# Patient Record
Sex: FEMALE | Race: WHITE | Employment: FULL TIME | URBAN - METROPOLITAN AREA
[De-identification: names, ages, dates, MRNs, and addresses within clinical notes are randomized per-mention and may not be internally consistent; named-entity substitution may affect disease eponyms.]

---

## 2019-06-24 ENCOUNTER — HOSPITAL ENCOUNTER (OUTPATIENT)
Dept: ULTRASOUND IMAGING | Age: 29
Discharge: HOME | End: 2019-06-24
Payer: COMMERCIAL

## 2019-06-24 DIAGNOSIS — R10.31 RLQ ABDOMINAL PAIN: ICD-10-CM

## 2019-06-24 DIAGNOSIS — R19.00 ABDOMINAL MASS, UNSPECIFIED ABDOMINAL LOCATION: ICD-10-CM

## 2019-06-24 PROCEDURE — 76856 US EXAM PELVIC COMPLETE: CPT

## 2019-06-24 PROCEDURE — 76830 TRANSVAGINAL US NON-OB: CPT

## 2019-06-27 ENCOUNTER — HOSPITAL ENCOUNTER (OUTPATIENT)
Dept: MRI IMAGING | Age: 29
Discharge: HOME | End: 2019-06-27
Payer: COMMERCIAL

## 2019-06-27 DIAGNOSIS — R19.00 PELVIC MASS: ICD-10-CM

## 2019-06-27 PROCEDURE — 6360000004 HC RX CONTRAST MEDICATION: Performed by: OBSTETRICS & GYNECOLOGY

## 2019-06-27 PROCEDURE — 72197 MRI PELVIS W/O & W/DYE: CPT

## 2019-06-27 PROCEDURE — A9579 GAD-BASE MR CONTRAST NOS,1ML: HCPCS | Performed by: OBSTETRICS & GYNECOLOGY

## 2019-06-27 RX ADMIN — GADOTERIDOL 10 ML: 279.3 INJECTION, SOLUTION INTRAVENOUS at 18:40

## 2019-07-01 ENCOUNTER — ANESTHESIA EVENT (OUTPATIENT)
Dept: OPERATING ROOM | Age: 29
DRG: 738 | End: 2019-07-01
Payer: COMMERCIAL

## 2019-07-01 NOTE — ANESTHESIA PRE PROCEDURE
kg)  (07/02/19)   BMI: 23           NPO Status:  >8 hrs    CBC:     HGB HCT PLT WBC   07/02/19 0645    13.2 g/dL       38.0 %       199 K/uL       3.7Low  K/uL        HCG (If Applicable):  negative    Anesthesia Evaluation  Patient summary reviewed and Nursing notes reviewed  Airway: Mallampati: II  TM distance: >3 FB   Neck ROM: full  Mouth opening: > = 3 FB Dental:          Pulmonary:       (-) COPD, asthma, sleep apnea and not a current smoker                           Cardiovascular:  Exercise tolerance: good (>4 METS),       (-) hypertension, dysrhythmias,  angina (HPI:  ) and  MIGUEL                Neuro/Psych:      (-) seizures, TIA and CVA           GI/Hepatic/Renal:        (-) GERD and no renal disease       Endo/Other:        (-) diabetes mellitus, hypothyroidism               Abdominal:           Vascular:     - DVT and PE. Anesthesia Plan      general     ASA 2     (TAP Block(s) for post-op pain management per surgeon request.)  Induction: intravenous. MIPS: Prophylactic antiemetics administered. Anesthetic plan and risks discussed with patient and mother. Plan discussed with CRNA.           Candy Gr MD

## 2019-07-02 ENCOUNTER — HOSPITAL ENCOUNTER (INPATIENT)
Age: 29
LOS: 2 days | Discharge: HOME | DRG: 738 | End: 2019-07-04
Attending: OBSTETRICS & GYNECOLOGY | Admitting: OBSTETRICS & GYNECOLOGY
Payer: COMMERCIAL

## 2019-07-02 ENCOUNTER — ANESTHESIA (OUTPATIENT)
Dept: OPERATING ROOM | Age: 29
DRG: 738 | End: 2019-07-02
Payer: COMMERCIAL

## 2019-07-02 VITALS — SYSTOLIC BLOOD PRESSURE: 95 MMHG | OXYGEN SATURATION: 100 % | DIASTOLIC BLOOD PRESSURE: 66 MMHG | TEMPERATURE: 97.7 F

## 2019-07-02 DIAGNOSIS — C56.1 RIGHT OVARIAN EPITHELIAL CANCER (HCC): Primary | ICD-10-CM

## 2019-07-02 LAB
ABO/RH: NORMAL
ANTIBODY SCREEN: NORMAL
BASOPHILS ABSOLUTE: 0 K/UL (ref 0–0.2)
BASOPHILS RELATIVE PERCENT: 0.4 %
EOSINOPHILS ABSOLUTE: 0 K/UL (ref 0–0.6)
EOSINOPHILS RELATIVE PERCENT: 1 %
HCT VFR BLD CALC: 38 % (ref 36–48)
HEMOGLOBIN: 13.2 G/DL (ref 12–16)
LYMPHOCYTES ABSOLUTE: 0.8 K/UL (ref 1–5.1)
LYMPHOCYTES RELATIVE PERCENT: 20.9 %
MCH RBC QN AUTO: 30.5 PG (ref 26–34)
MCHC RBC AUTO-ENTMCNC: 34.9 G/DL (ref 31–36)
MCV RBC AUTO: 87.4 FL (ref 80–100)
MONOCYTES ABSOLUTE: 0.6 K/UL (ref 0–1.3)
MONOCYTES RELATIVE PERCENT: 16.7 %
NEUTROPHILS ABSOLUTE: 2.3 K/UL (ref 1.7–7.7)
NEUTROPHILS RELATIVE PERCENT: 61 %
PDW BLD-RTO: 12 % (ref 12.4–15.4)
PLATELET # BLD: 199 K/UL (ref 135–450)
PMV BLD AUTO: 7.7 FL (ref 5–10.5)
PREGNANCY, URINE: NEGATIVE
RBC # BLD: 4.34 M/UL (ref 4–5.2)
WBC # BLD: 3.7 K/UL (ref 4–11)

## 2019-07-02 PROCEDURE — 0UT00ZZ RESECTION OF RIGHT OVARY, OPEN APPROACH: ICD-10-PCS | Performed by: OBSTETRICS & GYNECOLOGY

## 2019-07-02 PROCEDURE — 2500000003 HC RX 250 WO HCPCS: Performed by: NURSE ANESTHETIST, CERTIFIED REGISTERED

## 2019-07-02 PROCEDURE — 7100000001 HC PACU RECOVERY - ADDTL 15 MIN: Performed by: OBSTETRICS & GYNECOLOGY

## 2019-07-02 PROCEDURE — 0DBU0ZZ EXCISION OF OMENTUM, OPEN APPROACH: ICD-10-PCS | Performed by: OBSTETRICS & GYNECOLOGY

## 2019-07-02 PROCEDURE — 85025 COMPLETE CBC W/AUTO DIFF WBC: CPT

## 2019-07-02 PROCEDURE — 88307 TISSUE EXAM BY PATHOLOGIST: CPT

## 2019-07-02 PROCEDURE — 3600000014 HC SURGERY LEVEL 4 ADDTL 15MIN: Performed by: OBSTETRICS & GYNECOLOGY

## 2019-07-02 PROCEDURE — 2580000003 HC RX 258: Performed by: ANESTHESIOLOGY

## 2019-07-02 PROCEDURE — 3700000000 HC ANESTHESIA ATTENDED CARE: Performed by: OBSTETRICS & GYNECOLOGY

## 2019-07-02 PROCEDURE — 36415 COLL VENOUS BLD VENIPUNCTURE: CPT

## 2019-07-02 PROCEDURE — 3600000004 HC SURGERY LEVEL 4 BASE: Performed by: OBSTETRICS & GYNECOLOGY

## 2019-07-02 PROCEDURE — 88341 IMHCHEM/IMCYTCHM EA ADD ANTB: CPT

## 2019-07-02 PROCEDURE — 2709999900 HC NON-CHARGEABLE SUPPLY: Performed by: OBSTETRICS & GYNECOLOGY

## 2019-07-02 PROCEDURE — 0UT50ZZ RESECTION OF RIGHT FALLOPIAN TUBE, OPEN APPROACH: ICD-10-PCS | Performed by: OBSTETRICS & GYNECOLOGY

## 2019-07-02 PROCEDURE — 88112 CYTOPATH CELL ENHANCE TECH: CPT

## 2019-07-02 PROCEDURE — 3700000001 HC ADD 15 MINUTES (ANESTHESIA): Performed by: OBSTETRICS & GYNECOLOGY

## 2019-07-02 PROCEDURE — 2500000003 HC RX 250 WO HCPCS: Performed by: ANESTHESIOLOGY

## 2019-07-02 PROCEDURE — 94770 HC ETCO2 MONITOR DAILY: CPT

## 2019-07-02 PROCEDURE — 6360000002 HC RX W HCPCS: Performed by: NURSE ANESTHETIST, CERTIFIED REGISTERED

## 2019-07-02 PROCEDURE — 6370000000 HC RX 637 (ALT 250 FOR IP): Performed by: ANESTHESIOLOGY

## 2019-07-02 PROCEDURE — 84703 CHORIONIC GONADOTROPIN ASSAY: CPT

## 2019-07-02 PROCEDURE — 2720000010 HC SURG SUPPLY STERILE: Performed by: OBSTETRICS & GYNECOLOGY

## 2019-07-02 PROCEDURE — 6360000002 HC RX W HCPCS: Performed by: ANESTHESIOLOGY

## 2019-07-02 PROCEDURE — 64488 TAP BLOCK BI INJECTION: CPT | Performed by: ANESTHESIOLOGY

## 2019-07-02 PROCEDURE — 88331 PATH CONSLTJ SURG 1 BLK 1SPC: CPT

## 2019-07-02 PROCEDURE — 2580000003 HC RX 258: Performed by: OBSTETRICS & GYNECOLOGY

## 2019-07-02 PROCEDURE — 1200000000 HC SEMI PRIVATE

## 2019-07-02 PROCEDURE — 6360000002 HC RX W HCPCS: Performed by: OBSTETRICS & GYNECOLOGY

## 2019-07-02 PROCEDURE — 86850 RBC ANTIBODY SCREEN: CPT

## 2019-07-02 PROCEDURE — 88305 TISSUE EXAM BY PATHOLOGIST: CPT

## 2019-07-02 PROCEDURE — 86901 BLOOD TYPING SEROLOGIC RH(D): CPT

## 2019-07-02 PROCEDURE — 86304 IMMUNOASSAY TUMOR CA 125: CPT

## 2019-07-02 PROCEDURE — 88342 IMHCHEM/IMCYTCHM 1ST ANTB: CPT

## 2019-07-02 PROCEDURE — 86900 BLOOD TYPING SEROLOGIC ABO: CPT

## 2019-07-02 PROCEDURE — 7100000000 HC PACU RECOVERY - FIRST 15 MIN: Performed by: OBSTETRICS & GYNECOLOGY

## 2019-07-02 RX ORDER — DEXAMETHASONE SODIUM PHOSPHATE 4 MG/ML
INJECTION, SOLUTION INTRA-ARTICULAR; INTRALESIONAL; INTRAMUSCULAR; INTRAVENOUS; SOFT TISSUE PRN
Status: DISCONTINUED | OUTPATIENT
Start: 2019-07-02 | End: 2019-07-02 | Stop reason: SDUPTHER

## 2019-07-02 RX ORDER — LIDOCAINE HYDROCHLORIDE 20 MG/ML
INJECTION, SOLUTION INFILTRATION; PERINEURAL PRN
Status: DISCONTINUED | OUTPATIENT
Start: 2019-07-02 | End: 2019-07-02 | Stop reason: SDUPTHER

## 2019-07-02 RX ORDER — KETOROLAC TROMETHAMINE 30 MG/ML
INJECTION, SOLUTION INTRAMUSCULAR; INTRAVENOUS PRN
Status: DISCONTINUED | OUTPATIENT
Start: 2019-07-02 | End: 2019-07-02 | Stop reason: SDUPTHER

## 2019-07-02 RX ORDER — ONDANSETRON 2 MG/ML
4 INJECTION INTRAMUSCULAR; INTRAVENOUS EVERY 6 HOURS PRN
Status: DISCONTINUED | OUTPATIENT
Start: 2019-07-02 | End: 2019-07-04 | Stop reason: HOSPADM

## 2019-07-02 RX ORDER — SODIUM CHLORIDE 0.9 % (FLUSH) 0.9 %
10 SYRINGE (ML) INJECTION PRN
Status: DISCONTINUED | OUTPATIENT
Start: 2019-07-02 | End: 2019-07-04 | Stop reason: HOSPADM

## 2019-07-02 RX ORDER — DEXTROSE, SODIUM CHLORIDE, SODIUM LACTATE, POTASSIUM CHLORIDE, AND CALCIUM CHLORIDE 5; .6; .31; .03; .02 G/100ML; G/100ML; G/100ML; G/100ML; G/100ML
INJECTION, SOLUTION INTRAVENOUS CONTINUOUS
Status: DISCONTINUED | OUTPATIENT
Start: 2019-07-02 | End: 2019-07-04 | Stop reason: HOSPADM

## 2019-07-02 RX ORDER — SCOLOPAMINE TRANSDERMAL SYSTEM 1 MG/1
1 PATCH, EXTENDED RELEASE TRANSDERMAL
Status: DISCONTINUED | OUTPATIENT
Start: 2019-07-02 | End: 2019-07-04 | Stop reason: HOSPADM

## 2019-07-02 RX ORDER — ONDANSETRON 2 MG/ML
INJECTION INTRAMUSCULAR; INTRAVENOUS PRN
Status: DISCONTINUED | OUTPATIENT
Start: 2019-07-02 | End: 2019-07-02

## 2019-07-02 RX ORDER — ACETAMINOPHEN 325 MG/1
650 TABLET ORAL EVERY 4 HOURS PRN
Status: DISCONTINUED | OUTPATIENT
Start: 2019-07-02 | End: 2019-07-04 | Stop reason: HOSPADM

## 2019-07-02 RX ORDER — SODIUM CHLORIDE 0.9 % (FLUSH) 0.9 %
10 SYRINGE (ML) INJECTION PRN
Status: DISCONTINUED | OUTPATIENT
Start: 2019-07-02 | End: 2019-07-02 | Stop reason: HOSPADM

## 2019-07-02 RX ORDER — BUPIVACAINE HYDROCHLORIDE 2.5 MG/ML
INJECTION, SOLUTION EPIDURAL; INFILTRATION; INTRACAUDAL PRN
Status: DISCONTINUED | OUTPATIENT
Start: 2019-07-02 | End: 2019-07-02 | Stop reason: SDUPTHER

## 2019-07-02 RX ORDER — LIDOCAINE HYDROCHLORIDE 10 MG/ML
2 INJECTION, SOLUTION INFILTRATION; PERINEURAL
Status: DISCONTINUED | OUTPATIENT
Start: 2019-07-02 | End: 2019-07-02 | Stop reason: HOSPADM

## 2019-07-02 RX ORDER — GLYCOPYRROLATE 0.2 MG/ML
INJECTION INTRAMUSCULAR; INTRAVENOUS PRN
Status: DISCONTINUED | OUTPATIENT
Start: 2019-07-02 | End: 2019-07-02 | Stop reason: SDUPTHER

## 2019-07-02 RX ORDER — FENTANYL CITRATE 50 UG/ML
INJECTION, SOLUTION INTRAMUSCULAR; INTRAVENOUS PRN
Status: DISCONTINUED | OUTPATIENT
Start: 2019-07-02 | End: 2019-07-02 | Stop reason: SDUPTHER

## 2019-07-02 RX ORDER — HYDRALAZINE HYDROCHLORIDE 20 MG/ML
5 INJECTION INTRAMUSCULAR; INTRAVENOUS EVERY 10 MIN PRN
Status: DISCONTINUED | OUTPATIENT
Start: 2019-07-02 | End: 2019-07-02 | Stop reason: HOSPADM

## 2019-07-02 RX ORDER — APREPITANT 40 MG/1
40 CAPSULE ORAL ONCE
Status: COMPLETED | OUTPATIENT
Start: 2019-07-02 | End: 2019-07-02

## 2019-07-02 RX ORDER — NALOXONE HYDROCHLORIDE 0.4 MG/ML
0.4 INJECTION, SOLUTION INTRAMUSCULAR; INTRAVENOUS; SUBCUTANEOUS PRN
Status: DISCONTINUED | OUTPATIENT
Start: 2019-07-02 | End: 2019-07-03

## 2019-07-02 RX ORDER — OXYCODONE HYDROCHLORIDE AND ACETAMINOPHEN 5; 325 MG/1; MG/1
1 TABLET ORAL PRN
Status: DISCONTINUED | OUTPATIENT
Start: 2019-07-02 | End: 2019-07-02 | Stop reason: HOSPADM

## 2019-07-02 RX ORDER — SODIUM CHLORIDE 0.9 % (FLUSH) 0.9 %
10 SYRINGE (ML) INJECTION EVERY 12 HOURS SCHEDULED
Status: DISCONTINUED | OUTPATIENT
Start: 2019-07-02 | End: 2019-07-02 | Stop reason: HOSPADM

## 2019-07-02 RX ORDER — SODIUM CHLORIDE, SODIUM LACTATE, POTASSIUM CHLORIDE, CALCIUM CHLORIDE 600; 310; 30; 20 MG/100ML; MG/100ML; MG/100ML; MG/100ML
INJECTION, SOLUTION INTRAVENOUS CONTINUOUS
Status: DISCONTINUED | OUTPATIENT
Start: 2019-07-02 | End: 2019-07-04 | Stop reason: HOSPADM

## 2019-07-02 RX ORDER — PROPOFOL 10 MG/ML
INJECTION, EMULSION INTRAVENOUS PRN
Status: DISCONTINUED | OUTPATIENT
Start: 2019-07-02 | End: 2019-07-02 | Stop reason: SDUPTHER

## 2019-07-02 RX ORDER — HYDROMORPHONE HCL 110MG/55ML
PATIENT CONTROLLED ANALGESIA SYRINGE INTRAVENOUS PRN
Status: DISCONTINUED | OUTPATIENT
Start: 2019-07-02 | End: 2019-07-02 | Stop reason: SDUPTHER

## 2019-07-02 RX ORDER — LEVOFLOXACIN 5 MG/ML
500 INJECTION, SOLUTION INTRAVENOUS
Status: COMPLETED | OUTPATIENT
Start: 2019-07-02 | End: 2019-07-02

## 2019-07-02 RX ORDER — MIDAZOLAM HYDROCHLORIDE 1 MG/ML
INJECTION INTRAMUSCULAR; INTRAVENOUS PRN
Status: DISCONTINUED | OUTPATIENT
Start: 2019-07-02 | End: 2019-07-02 | Stop reason: SDUPTHER

## 2019-07-02 RX ORDER — ROCURONIUM BROMIDE 10 MG/ML
INJECTION, SOLUTION INTRAVENOUS PRN
Status: DISCONTINUED | OUTPATIENT
Start: 2019-07-02 | End: 2019-07-02 | Stop reason: SDUPTHER

## 2019-07-02 RX ORDER — MEPERIDINE HYDROCHLORIDE 50 MG/ML
12.5 INJECTION INTRAMUSCULAR; INTRAVENOUS; SUBCUTANEOUS EVERY 5 MIN PRN
Status: DISCONTINUED | OUTPATIENT
Start: 2019-07-02 | End: 2019-07-02 | Stop reason: HOSPADM

## 2019-07-02 RX ORDER — PROMETHAZINE HYDROCHLORIDE 25 MG/ML
6.25 INJECTION, SOLUTION INTRAMUSCULAR; INTRAVENOUS
Status: DISCONTINUED | OUTPATIENT
Start: 2019-07-02 | End: 2019-07-02 | Stop reason: HOSPADM

## 2019-07-02 RX ORDER — FENTANYL CITRATE 50 UG/ML
25 INJECTION, SOLUTION INTRAMUSCULAR; INTRAVENOUS EVERY 5 MIN PRN
Status: DISCONTINUED | OUTPATIENT
Start: 2019-07-02 | End: 2019-07-02 | Stop reason: HOSPADM

## 2019-07-02 RX ORDER — PROMETHAZINE HYDROCHLORIDE 25 MG/ML
INJECTION, SOLUTION INTRAMUSCULAR; INTRAVENOUS PRN
Status: DISCONTINUED | OUTPATIENT
Start: 2019-07-02 | End: 2019-07-02 | Stop reason: SDUPTHER

## 2019-07-02 RX ORDER — SODIUM CHLORIDE 0.9 % (FLUSH) 0.9 %
10 SYRINGE (ML) INJECTION EVERY 12 HOURS SCHEDULED
Status: DISCONTINUED | OUTPATIENT
Start: 2019-07-02 | End: 2019-07-04 | Stop reason: HOSPADM

## 2019-07-02 RX ORDER — ONDANSETRON 2 MG/ML
4 INJECTION INTRAMUSCULAR; INTRAVENOUS
Status: DISCONTINUED | OUTPATIENT
Start: 2019-07-02 | End: 2019-07-02 | Stop reason: HOSPADM

## 2019-07-02 RX ORDER — ACETAMINOPHEN 500 MG
1000 TABLET ORAL ONCE
Status: COMPLETED | OUTPATIENT
Start: 2019-07-02 | End: 2019-07-02

## 2019-07-02 RX ORDER — MORPHINE SULFATE/0.9% NACL/PF 1 MG/ML
SYRINGE (ML) INJECTION CONTINUOUS
Status: DISCONTINUED | OUTPATIENT
Start: 2019-07-02 | End: 2019-07-03

## 2019-07-02 RX ORDER — MAGNESIUM HYDROXIDE 1200 MG/15ML
LIQUID ORAL CONTINUOUS PRN
Status: COMPLETED | OUTPATIENT
Start: 2019-07-02 | End: 2019-07-02

## 2019-07-02 RX ORDER — LIDOCAINE HYDROCHLORIDE 10 MG/ML
1 INJECTION, SOLUTION EPIDURAL; INFILTRATION; INTRACAUDAL; PERINEURAL
Status: DISCONTINUED | OUTPATIENT
Start: 2019-07-02 | End: 2019-07-02 | Stop reason: HOSPADM

## 2019-07-02 RX ORDER — DOCUSATE SODIUM 100 MG/1
100 CAPSULE, LIQUID FILLED ORAL 2 TIMES DAILY
Status: DISCONTINUED | OUTPATIENT
Start: 2019-07-03 | End: 2019-07-04 | Stop reason: HOSPADM

## 2019-07-02 RX ORDER — OXYCODONE HYDROCHLORIDE AND ACETAMINOPHEN 5; 325 MG/1; MG/1
2 TABLET ORAL PRN
Status: DISCONTINUED | OUTPATIENT
Start: 2019-07-02 | End: 2019-07-02 | Stop reason: HOSPADM

## 2019-07-02 RX ADMIN — PROMETHAZINE HYDROCHLORIDE 12.5 MG: 25 INJECTION INTRAMUSCULAR; INTRAVENOUS at 09:37

## 2019-07-02 RX ADMIN — ONDANSETRON 4 MG: 2 INJECTION INTRAMUSCULAR; INTRAVENOUS at 07:45

## 2019-07-02 RX ADMIN — HYDROMORPHONE HYDROCHLORIDE 0.5 MG: 2 INJECTION INTRAMUSCULAR; INTRAVENOUS; SUBCUTANEOUS at 08:24

## 2019-07-02 RX ADMIN — DEXAMETHASONE SODIUM PHOSPHATE 8 MG: 4 INJECTION, SOLUTION INTRAMUSCULAR; INTRAVENOUS at 07:45

## 2019-07-02 RX ADMIN — PHENYLEPHRINE HYDROCHLORIDE 100 MCG: 10 INJECTION INTRAVENOUS at 08:34

## 2019-07-02 RX ADMIN — HYDROMORPHONE HYDROCHLORIDE 0.5 MG: 2 INJECTION INTRAMUSCULAR; INTRAVENOUS; SUBCUTANEOUS at 09:01

## 2019-07-02 RX ADMIN — MIDAZOLAM HYDROCHLORIDE 2 MG: 2 INJECTION, SOLUTION INTRAMUSCULAR; INTRAVENOUS at 07:29

## 2019-07-02 RX ADMIN — PHENYLEPHRINE HYDROCHLORIDE 100 MCG: 10 INJECTION INTRAVENOUS at 09:20

## 2019-07-02 RX ADMIN — SODIUM CHLORIDE, SODIUM LACTATE, POTASSIUM CHLORIDE, CALCIUM CHLORIDE AND DEXTROSE MONOHYDRATE: 5; 600; 310; 30; 20 INJECTION, SOLUTION INTRAVENOUS at 20:44

## 2019-07-02 RX ADMIN — SODIUM CHLORIDE, POTASSIUM CHLORIDE, SODIUM LACTATE AND CALCIUM CHLORIDE: 600; 310; 30; 20 INJECTION, SOLUTION INTRAVENOUS at 06:54

## 2019-07-02 RX ADMIN — SUGAMMADEX 200 MG: 100 INJECTION, SOLUTION INTRAVENOUS at 09:47

## 2019-07-02 RX ADMIN — BUPIVACAINE HYDROCHLORIDE 30 ML: 2.5 INJECTION, SOLUTION EPIDURAL; INFILTRATION; INTRACAUDAL; PERINEURAL at 10:41

## 2019-07-02 RX ADMIN — GLYCOPYRROLATE 0.2 MG: 0.2 INJECTION, SOLUTION INTRAMUSCULAR; INTRAVENOUS at 08:10

## 2019-07-02 RX ADMIN — FENTANYL CITRATE 50 MCG: 50 INJECTION INTRAMUSCULAR; INTRAVENOUS at 07:32

## 2019-07-02 RX ADMIN — BUPIVACAINE HYDROCHLORIDE 30 ML: 2.5 INJECTION, SOLUTION EPIDURAL; INFILTRATION; INTRACAUDAL; PERINEURAL at 10:44

## 2019-07-02 RX ADMIN — FENTANYL CITRATE 50 MCG: 50 INJECTION INTRAMUSCULAR; INTRAVENOUS at 07:53

## 2019-07-02 RX ADMIN — SODIUM CHLORIDE, POTASSIUM CHLORIDE, SODIUM LACTATE AND CALCIUM CHLORIDE: 600; 310; 30; 20 INJECTION, SOLUTION INTRAVENOUS at 08:21

## 2019-07-02 RX ADMIN — KETOROLAC TROMETHAMINE 30 MG: 30 INJECTION, SOLUTION INTRAMUSCULAR; INTRAVENOUS at 09:37

## 2019-07-02 RX ADMIN — PHENYLEPHRINE HYDROCHLORIDE 100 MCG: 10 INJECTION INTRAVENOUS at 09:08

## 2019-07-02 RX ADMIN — ROCURONIUM BROMIDE 35 MG: 10 SOLUTION INTRAVENOUS at 07:35

## 2019-07-02 RX ADMIN — APREPITANT 40 MG: 40 CAPSULE ORAL at 06:59

## 2019-07-02 RX ADMIN — LEVOFLOXACIN 500 MG: 5 INJECTION, SOLUTION INTRAVENOUS at 07:32

## 2019-07-02 RX ADMIN — ROCURONIUM BROMIDE 10 MG: 10 SOLUTION INTRAVENOUS at 09:00

## 2019-07-02 RX ADMIN — SODIUM CHLORIDE, SODIUM LACTATE, POTASSIUM CHLORIDE, CALCIUM CHLORIDE AND DEXTROSE MONOHYDRATE: 5; 600; 310; 30; 20 INJECTION, SOLUTION INTRAVENOUS at 17:57

## 2019-07-02 RX ADMIN — ACETAMINOPHEN 1000 MG: 500 TABLET ORAL at 06:58

## 2019-07-02 RX ADMIN — PROPOFOL 100 MG: 10 INJECTION, EMULSION INTRAVENOUS at 07:35

## 2019-07-02 RX ADMIN — ROCURONIUM BROMIDE 15 MG: 10 SOLUTION INTRAVENOUS at 08:21

## 2019-07-02 RX ADMIN — MORPHINE SULFATE 30 MG: 1 INJECTION INTRAVENOUS at 10:51

## 2019-07-02 RX ADMIN — LIDOCAINE HYDROCHLORIDE 40 MG: 20 INJECTION, SOLUTION INFILTRATION; PERINEURAL at 07:35

## 2019-07-02 RX ADMIN — PHENYLEPHRINE HYDROCHLORIDE 100 MCG: 10 INJECTION INTRAVENOUS at 08:46

## 2019-07-02 ASSESSMENT — PULMONARY FUNCTION TESTS
PIF_VALUE: 15
PIF_VALUE: 16
PIF_VALUE: 11
PIF_VALUE: 15
PIF_VALUE: 16
PIF_VALUE: 19
PIF_VALUE: 3
PIF_VALUE: 16
PIF_VALUE: 15
PIF_VALUE: 17
PIF_VALUE: 15
PIF_VALUE: 11
PIF_VALUE: 20
PIF_VALUE: 16
PIF_VALUE: 5
PIF_VALUE: 15
PIF_VALUE: 21
PIF_VALUE: 16
PIF_VALUE: 15
PIF_VALUE: 14
PIF_VALUE: 1
PIF_VALUE: 3
PIF_VALUE: 16
PIF_VALUE: 1
PIF_VALUE: 14
PIF_VALUE: 15
PIF_VALUE: 15
PIF_VALUE: 17
PIF_VALUE: 9
PIF_VALUE: 11
PIF_VALUE: 15
PIF_VALUE: 16
PIF_VALUE: 14
PIF_VALUE: 20
PIF_VALUE: 15
PIF_VALUE: 16
PIF_VALUE: 15
PIF_VALUE: 16
PIF_VALUE: 16
PIF_VALUE: 11
PIF_VALUE: 2
PIF_VALUE: 11
PIF_VALUE: 16
PIF_VALUE: 15
PIF_VALUE: 15
PIF_VALUE: 17
PIF_VALUE: 15
PIF_VALUE: 11
PIF_VALUE: 16
PIF_VALUE: 16
PIF_VALUE: 6
PIF_VALUE: 16
PIF_VALUE: 20
PIF_VALUE: 16
PIF_VALUE: 15
PIF_VALUE: 1
PIF_VALUE: 15
PIF_VALUE: 17
PIF_VALUE: 16
PIF_VALUE: 2
PIF_VALUE: 14
PIF_VALUE: 15
PIF_VALUE: 2
PIF_VALUE: 16
PIF_VALUE: 1
PIF_VALUE: 15
PIF_VALUE: 1
PIF_VALUE: 15
PIF_VALUE: 15
PIF_VALUE: 16
PIF_VALUE: 9
PIF_VALUE: 1
PIF_VALUE: 16
PIF_VALUE: 17
PIF_VALUE: 15
PIF_VALUE: 16
PIF_VALUE: 0
PIF_VALUE: 15
PIF_VALUE: 16
PIF_VALUE: 15
PIF_VALUE: 15
PIF_VALUE: 16
PIF_VALUE: 15
PIF_VALUE: 16
PIF_VALUE: 16
PIF_VALUE: 20
PIF_VALUE: 16
PIF_VALUE: 20
PIF_VALUE: 21
PIF_VALUE: 16
PIF_VALUE: 16
PIF_VALUE: 15
PIF_VALUE: 15
PIF_VALUE: 16
PIF_VALUE: 15
PIF_VALUE: 15
PIF_VALUE: 0
PIF_VALUE: 1
PIF_VALUE: 15
PIF_VALUE: 16
PIF_VALUE: 15
PIF_VALUE: 17
PIF_VALUE: 16
PIF_VALUE: 15
PIF_VALUE: 16
PIF_VALUE: 16
PIF_VALUE: 20
PIF_VALUE: 15
PIF_VALUE: 14
PIF_VALUE: 16
PIF_VALUE: 11
PIF_VALUE: 16
PIF_VALUE: 15
PIF_VALUE: 1
PIF_VALUE: 16
PIF_VALUE: 15
PIF_VALUE: 15
PIF_VALUE: 20
PIF_VALUE: 2
PIF_VALUE: 15
PIF_VALUE: 16
PIF_VALUE: 2
PIF_VALUE: 15
PIF_VALUE: 15
PIF_VALUE: 16
PIF_VALUE: 14
PIF_VALUE: 15
PIF_VALUE: 15
PIF_VALUE: 21
PIF_VALUE: 16
PIF_VALUE: 2
PIF_VALUE: 15
PIF_VALUE: 2
PIF_VALUE: 2
PIF_VALUE: 16
PIF_VALUE: 16
PIF_VALUE: 15
PIF_VALUE: 3
PIF_VALUE: 17
PIF_VALUE: 16
PIF_VALUE: 20
PIF_VALUE: 11

## 2019-07-02 ASSESSMENT — PAIN DESCRIPTION - FREQUENCY
FREQUENCY: CONTINUOUS
FREQUENCY: CONTINUOUS

## 2019-07-02 ASSESSMENT — PAIN DESCRIPTION - PROGRESSION
CLINICAL_PROGRESSION: GRADUALLY IMPROVING
CLINICAL_PROGRESSION: GRADUALLY IMPROVING

## 2019-07-02 ASSESSMENT — PAIN - FUNCTIONAL ASSESSMENT
PAIN_FUNCTIONAL_ASSESSMENT: PREVENTS OR INTERFERES SOME ACTIVE ACTIVITIES AND ADLS
PAIN_FUNCTIONAL_ASSESSMENT: 0-10

## 2019-07-02 ASSESSMENT — PAIN SCALES - GENERAL
PAINLEVEL_OUTOF10: 2
PAINLEVEL_OUTOF10: 3
PAINLEVEL_OUTOF10: 2

## 2019-07-02 ASSESSMENT — PAIN DESCRIPTION - DESCRIPTORS
DESCRIPTORS: ACHING;CRAMPING
DESCRIPTORS: ACHING

## 2019-07-02 ASSESSMENT — PAIN DESCRIPTION - PAIN TYPE: TYPE: SURGICAL PAIN

## 2019-07-02 ASSESSMENT — PAIN DESCRIPTION - ORIENTATION
ORIENTATION: LOWER
ORIENTATION: LOWER

## 2019-07-02 ASSESSMENT — PAIN DESCRIPTION - LOCATION
LOCATION: ABDOMEN
LOCATION: ABDOMEN

## 2019-07-02 ASSESSMENT — PAIN DESCRIPTION - ONSET
ONSET: ON-GOING
ONSET: ON-GOING

## 2019-07-02 ASSESSMENT — LIFESTYLE VARIABLES: SMOKING_STATUS: 0

## 2019-07-02 NOTE — PROGRESS NOTES
Gave bedside report to Manav Ramsey RN C3 floor nurse. Performed 4 eye skin assessment. Patient has stable vitals, no vaginal bleeding or bleeding from sx site. Patient denies pain.  PCA in place

## 2019-07-02 NOTE — ANESTHESIA PROCEDURE NOTES
Peripheral Block    Patient location during procedure: PACU  Start time: 7/2/2019 10:40 AM  End time: 7/2/2019 10:45 AM  Staffing  Anesthesiologist: Jarrett Moran MD  Performed: anesthesiologist   Preanesthetic Checklist  Completed: patient identified, site marked, surgical consent, pre-op evaluation, timeout performed, IV checked, risks and benefits discussed, monitors and equipment checked, anesthesia consent given, oxygen available and patient being monitored  Peripheral Block  Patient position: supine  Prep: ChloraPrep  Patient monitoring: cardiac monitor, continuous pulse ox, frequent blood pressure checks and IV access  Block type: TAP  Laterality: bilateral  Injection technique: single-shot  Procedures: ultrasound guided  Provider prep: sterile gloves  Needle  Needle gauge: 21 G  Needle length: 10 cm  Needle localization: ultrasound guidance  Test dose: negative  Assessment  Injection assessment: negative aspiration for heme and no paresthesia on injection  Slow fractionated injection: yes  Hemodynamics: stable  Additional Notes  Bilateral Transversus Abdominus Plane Blocks  Pt. agrees to risks, benefits and alternatives to block  Block performed at the request of the surgeon for post-operative pain management   Immediately prior to procedure a \"time out\" was called to verify the correct patient, procedure, equipment, support staff. Site/side marked as required  Side: left and right   Site/Approach: Bilateral flanks at the level of the umbilicus in the anterior axillary line  Position: Supine  Sedation: None  Aseptic technique: prepped with chlorhexidine  Ultrasound: yes- see images    Needle:  21 g   10 mm block needle   Local Anesthetic:  Bupivacaine  0.25%   Amount: 30 ml  in 5 ml increments after negative aspiration each time on each side (60 ml total). Easy injection w/o resistance and w/o pain/paresthesias. Pt tolerated procedure well. No complications.   Reason for block: post-op pain

## 2019-07-02 NOTE — BRIEF OP NOTE
Brief Postoperative Note  ______________________________________________________________    Patient: Francie Srinivasan  YOB: 1990  MRN: [id number]  Date of Procedure: 7/2/2019    Pre-Op Diagnosis: Right ovarian mass    Post-Op Diagnosis: Right Ovarian Ca ( frozen)       Procedure(s):  RIGHT SALPINGO OOPHORECTOMY, PELVIS WASHING, PARTIAL OMENTECTOMY, FROZEN SECTION, EXPLORATORY LAPAROTOMY    Anesthesia: General    Surgeon(s):  Amy Felix MD    Assistant:     Estimated Blood Loss (mL): 303    Complications: None    Specimens:   ID Type Source Tests Collected by Time Destination   A : PELVIC  WASHINGS  FOR  CYTOLOGY Body Fluid Abdomen CYTOLOGY, NON-GYN Amy Felix MD 7/2/2019 9212    B : RIGHT TUBE AND OVARY Tissue Abdomen SURGICAL PATHOLOGY Aym Felix MD 7/2/2019 0815    C : BIOPSY CECUM SEROSA Tissue Abdomen SURGICAL PATHOLOGY Amy Felix MD 7/2/2019 0930    D : SMALL BOWELL SEROSA Tissue Abdomen SURGICAL PATHOLOGY Amy Felix MD 7/2/2019 0931    E : BIOPSY OMENTUM Specimen Abdomen SURGICAL PATHOLOGY Amy Felxi MD 7/2/2019 2097    F : RIGHT OVARIAN MASS Tissue Ovary SURGICAL PATHOLOGY Amy Felix MD 7/2/2019 0935        Implants:  * No implants in log *      Drains:   Urethral Catheter Non-latex 16 fr (Active)       Findings: Large Right ovarian mass, friable with necrotic tissue making up the greater portion. Right tube grossly normal, left tube and ovary normal. Uterus grossly normal. Posterior cul-de-sac normal. Cecum and ileum with irregular surface, mostly adhesion with questionable implants. Omentum normal. Pelvic side wall and para aortic areas with lymphadenopathy.      Allan Riojas MD  Date: 7/2/2019  Time: 9:59 AM

## 2019-07-02 NOTE — PROGRESS NOTES
4 Eyes Skin Assessment     The patient is being assess for   Post-Op Surgical    I agree that 2 RN's have performed a thorough Head to Toe Skin Assessment on the patient. ALL assessment sites listed below have been assessed. Areas assessed by both nurses:   [x]   Head, Face, and Ears   [x]   Shoulders, Back, and Chest, Abdomen  [x]   Arms, Elbows, and Hands   [x]   Coccyx, Sacrum, and Ischium  [x]   Legs, Feet, and Heels          **SHARE this note so that the co-signing nurse is able to place an eSignature**    Co-signer eSignature: {Esignature:[id number]}    Does the Patient have Skin Breakdown?   {Blank single:97354::\"No\",\"Yes LDA WOUND CARE was Initiated documentation include the Julianna-wound, Wound Assessment, Measurements, Dressing Treatment, Drainage, and Color\",\"}          Dawit Prevention initiated:  {YES/NO:19732}   Wound Care Orders initiated:  {YES/NO:19732}      [facility] nurse consulted for Pressure Injury (Stage 3,4, Unstageable, DTI, NWPT, Complex wounds)and New or Established Ostomies:  {YES/NO:19732}      Primary Nurse eSignature: Electronically signed by Grayson Flores RN on 7/2/19 at 12:06 PM

## 2019-07-03 LAB
BASOPHILS ABSOLUTE: 0 K/UL (ref 0–0.2)
BASOPHILS RELATIVE PERCENT: 0.2 %
CA 125: 258.8 U/ML (ref 0–35)
EOSINOPHILS ABSOLUTE: 0 K/UL (ref 0–0.6)
EOSINOPHILS RELATIVE PERCENT: 0.2 %
HCT VFR BLD CALC: 34.6 % (ref 36–48)
HEMOGLOBIN: 11.7 G/DL (ref 12–16)
LYMPHOCYTES ABSOLUTE: 0.9 K/UL (ref 1–5.1)
LYMPHOCYTES RELATIVE PERCENT: 13.5 %
MCH RBC QN AUTO: 30.1 PG (ref 26–34)
MCHC RBC AUTO-ENTMCNC: 33.7 G/DL (ref 31–36)
MCV RBC AUTO: 89.3 FL (ref 80–100)
MONOCYTES ABSOLUTE: 0.5 K/UL (ref 0–1.3)
MONOCYTES RELATIVE PERCENT: 8.1 %
NEUTROPHILS ABSOLUTE: 4.9 K/UL (ref 1.7–7.7)
NEUTROPHILS RELATIVE PERCENT: 78 %
PDW BLD-RTO: 12.3 % (ref 12.4–15.4)
PLATELET # BLD: 215 K/UL (ref 135–450)
PMV BLD AUTO: 7.6 FL (ref 5–10.5)
RBC # BLD: 3.88 M/UL (ref 4–5.2)
WBC # BLD: 6.3 K/UL (ref 4–11)

## 2019-07-03 PROCEDURE — 2580000003 HC RX 258: Performed by: OBSTETRICS & GYNECOLOGY

## 2019-07-03 PROCEDURE — 94770 HC ETCO2 MONITOR DAILY: CPT

## 2019-07-03 PROCEDURE — 85025 COMPLETE CBC W/AUTO DIFF WBC: CPT

## 2019-07-03 PROCEDURE — 36415 COLL VENOUS BLD VENIPUNCTURE: CPT

## 2019-07-03 PROCEDURE — 1200000000 HC SEMI PRIVATE

## 2019-07-03 PROCEDURE — 6370000000 HC RX 637 (ALT 250 FOR IP): Performed by: OBSTETRICS & GYNECOLOGY

## 2019-07-03 RX ORDER — OXYCODONE HYDROCHLORIDE AND ACETAMINOPHEN 5; 325 MG/1; MG/1
2 TABLET ORAL EVERY 4 HOURS PRN
Status: DISCONTINUED | OUTPATIENT
Start: 2019-07-03 | End: 2019-07-04 | Stop reason: HOSPADM

## 2019-07-03 RX ORDER — IBUPROFEN 400 MG/1
800 TABLET ORAL EVERY 6 HOURS PRN
Status: DISCONTINUED | OUTPATIENT
Start: 2019-07-03 | End: 2019-07-04 | Stop reason: HOSPADM

## 2019-07-03 RX ORDER — OXYCODONE HYDROCHLORIDE AND ACETAMINOPHEN 5; 325 MG/1; MG/1
1 TABLET ORAL EVERY 4 HOURS PRN
Status: DISCONTINUED | OUTPATIENT
Start: 2019-07-03 | End: 2019-07-04 | Stop reason: HOSPADM

## 2019-07-03 RX ADMIN — Medication 10 ML: at 20:59

## 2019-07-03 RX ADMIN — IBUPROFEN 800 MG: 400 TABLET ORAL at 10:58

## 2019-07-03 RX ADMIN — IBUPROFEN 800 MG: 400 TABLET ORAL at 18:54

## 2019-07-03 RX ADMIN — SODIUM CHLORIDE, SODIUM LACTATE, POTASSIUM CHLORIDE, CALCIUM CHLORIDE AND DEXTROSE MONOHYDRATE: 5; 600; 310; 30; 20 INJECTION, SOLUTION INTRAVENOUS at 05:16

## 2019-07-03 RX ADMIN — DOCUSATE SODIUM 100 MG: 100 CAPSULE, LIQUID FILLED ORAL at 09:34

## 2019-07-03 RX ADMIN — DOCUSATE SODIUM 100 MG: 100 CAPSULE, LIQUID FILLED ORAL at 20:59

## 2019-07-03 ASSESSMENT — PAIN SCALES - GENERAL
PAINLEVEL_OUTOF10: 1
PAINLEVEL_OUTOF10: 2

## 2019-07-03 NOTE — PROGRESS NOTES
POD #1  C/o soreness  BP (!) 92/59   Pulse 73   Temp 97.7 °F (36.5 °C) (Oral)   Resp 15   Ht 5' 2\" (1.575 m)   Wt 125 lb 4.8 oz (56.8 kg)   LMP 06/13/2019   SpO2 98%   BMI 22.92 kg/m²    I/O 4174/2870 net 1304  Lungs clear  Abd soft + B/S  Incision intact  Perineum dry  Doing well  CARLOS BUTLER  9:07 AM

## 2019-07-03 NOTE — PLAN OF CARE
Pt states that her surgical pain is controlled with morphine PCA pump. Pt denies needs currently. Call light within reach, will continue to monitor.

## 2019-07-03 NOTE — OP NOTE
[facility]                 LamMychal roldan                                 OPERATIVE REPORT    PATIENT NAME: Isaac Chau        :        1990  MED REC NO:   [id number]                          ROOM:       3939  ACCOUNT NO:   [de-identified]                           ADMIT DATE: 2019  PROVIDER:     Glynn Espinoza. Ozzie Carlson MD    DATE OF PROCEDURE:  2019    PREOPERATIVE DIAGNOSIS:  Right adnexal mass. 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 OPERATION PERFORMED:  1. Exploratory laparotomy with right salpingo-oophorectomy. 2.  Biopsy of the serosal of the ileum and cecum. 3.  Partial omentectomy, infracolic as high I was able to get given her  Pfannenstiel incision. 4.  Pelvic washing. SURGEON:  ANDI Carlson MD    ANESTHESIA:  General endotracheal.    OPERATIVE FINDINGS:  The patient had enlarged necrotic right ovarian  mass with a large amount of peritoneal fluid. As her right tube  appeared grossly normal, but it fairly close to the ovarian mass, there  seemed to be no involvement with the uterus. Her left tube and ovary  were grossly normal.  The anterior and posterior cul-de-sac had no  evidence of tumor. The omentum was grossly normal.  The pelvic sidewall  and the paraaortic areas were palpated and no lymphadenopathy was  appreciated. OPERATIVE PROCEDURE:  The patient was taken to the operating suite and  placed in the supine position. After administration of general  anesthesia intubation, her legs were frogged and perineum was prepped  and bladder was catheterized and left in situ. Her abdomen was then  prepped and draped in the usual fashion. Through a Pfannenstiel  incision, the peritoneal cavity was entered. On entering the peritoneal  cavity, a large amount of clear fluid was noted.   The trap attached to  the suction was used to collect the was done from angle to angle. The knot was tied on  the patient's left side and buried into the subcutaneous tissue. The  subcu was then approximated with 3-0 Vicryl followed by subcuticular  stitch using 4-0 Vicryl and Steri-Strips and sterile dressing. The  patient tolerated the procedure well. Instrument, sponge, and needle  counts were said to be correct. Estimated blood loss was 500 mL. Of note, prior to the procedure, the patient had desire preservation of  fertility and we had discussed not carrying of the hysterectomy and left  salpingo-oophorectomy as planned unless those areas were grossly   involved with the tumor. Since there were no involvements of the tumor  in this area, decision was made not to carry out this portion. She will  be referred to GYN oncologist and probably infertility specialist for  consultation and further management.         Cayla Hendrickson MD    D: 07/02/2019 10:24:05       T: 07/02/2019 19:43:47     [email or web address]  Job#: [id number]     Doc#: [id number]    CC:

## 2019-07-04 VITALS
WEIGHT: 125.3 LBS | HEART RATE: 88 BPM | OXYGEN SATURATION: 100 % | RESPIRATION RATE: 16 BRPM | TEMPERATURE: 98.1 F | SYSTOLIC BLOOD PRESSURE: 102 MMHG | HEIGHT: 62 IN | BODY MASS INDEX: 23.06 KG/M2 | DIASTOLIC BLOOD PRESSURE: 70 MMHG

## 2019-07-04 PROCEDURE — 6370000000 HC RX 637 (ALT 250 FOR IP): Performed by: OBSTETRICS & GYNECOLOGY

## 2019-07-04 PROCEDURE — 2580000003 HC RX 258: Performed by: OBSTETRICS & GYNECOLOGY

## 2019-07-04 RX ORDER — PSEUDOEPHEDRINE HCL 30 MG
100 TABLET ORAL 2 TIMES DAILY
Qty: 28 CAPSULE | Refills: 1 | Status: SHIPPED | OUTPATIENT
Start: 2019-07-04 | End: 2019-07-18

## 2019-07-04 RX ORDER — OXYCODONE HYDROCHLORIDE AND ACETAMINOPHEN 5; 325 MG/1; MG/1
1 TABLET ORAL EVERY 6 HOURS PRN
Qty: 20 TABLET | Refills: 0 | Status: SHIPPED | OUTPATIENT
Start: 2019-07-04 | End: 2019-07-09

## 2019-07-04 RX ORDER — IBUPROFEN 800 MG/1
800 TABLET ORAL EVERY 6 HOURS PRN
Qty: 60 TABLET | Refills: 1 | Status: ON HOLD | OUTPATIENT
Start: 2019-07-04 | End: 2021-04-11 | Stop reason: ALTCHOICE

## 2019-07-04 RX ADMIN — IBUPROFEN 800 MG: 400 TABLET ORAL at 01:16

## 2019-07-04 RX ADMIN — OXYCODONE HYDROCHLORIDE AND ACETAMINOPHEN 1 TABLET: 5; 325 TABLET ORAL at 05:47

## 2019-07-04 RX ADMIN — DOCUSATE SODIUM 100 MG: 100 CAPSULE, LIQUID FILLED ORAL at 08:28

## 2019-07-04 RX ADMIN — IBUPROFEN 800 MG: 400 TABLET ORAL at 08:36

## 2019-07-04 RX ADMIN — OXYCODONE HYDROCHLORIDE AND ACETAMINOPHEN 1 TABLET: 5; 325 TABLET ORAL at 10:38

## 2019-07-04 RX ADMIN — Medication 10 ML: at 08:28

## 2019-07-04 ASSESSMENT — PAIN SCALES - GENERAL
PAINLEVEL_OUTOF10: 4
PAINLEVEL_OUTOF10: 1
PAINLEVEL_OUTOF10: 2
PAINLEVEL_OUTOF10: 4

## 2019-07-04 NOTE — PROGRESS NOTES
Pt's verbal and written discharge instructions given, all questions answered. IV removed. Follow up appointments made and discussed. New medications reviewed and pain  Medication script given to pt. Pt left in stable condition via wheelchair to private car with family.

## 2019-07-04 NOTE — PROGRESS NOTES
POD #2  Reports tolerating po well., passage of flatus, no nausea or vomiting. No bowel movement. Adequate pain control Motrin and Percocet  /70   Pulse 88   Temp 98.1 °F (36.7 °C) (Oral)   Resp 16   Ht 5' 2\" (1.575 m)   Wt 125 lb 4.8 oz (56.8 kg)   LMP 06/13/2019   SpO2 100%   BMI 22.92 kg/m²    Abd slight distension, Incision intact  Bowel sound active  Hgb 11.3  CA-125 258.8  Pelvic washing negative   Path pending.   Doing well  Plan discharge home  [address]  12:58 PM

## 2019-07-04 NOTE — PROGRESS NOTES
Shift assessment completed. Pt A&O, VSS. Transverse incision, C/D/I, with steri stripes in place. P tolerating general diet with no nausea. Pt up ambulating in room, hallway, and to bathroom independently. Non skid socks on. PRN pain medication given per MAR. Bowel sounds active and pt reports passing gas this morning. Denies any needs at this time. Bed locked and in lowest position. Call light and bedside table within reach. Will continue to monitor.

## 2019-07-19 LAB
CA 125: 73.5 U/ML (ref 0–35)
GONADOTROPIN, CHORIONIC (HCG) QUANT: <5 MIU/ML
LACTATE DEHYDROGENASE: 272 U/L (ref 100–190)

## 2019-07-23 ENCOUNTER — TELEPHONE (OUTPATIENT)
Dept: ENDOCRINOLOGY | Age: 29
End: 2019-07-23

## 2019-07-23 LAB — AFP: 4.9 UG/L

## 2019-07-24 NOTE — PROGRESS NOTES
Obstructive Sleep Apnea (EFE) Screening     Patient:  Bobby Monge    YOB: 1990      Medical Record #:  [id number]                     Date:  7/24/2019     1. Are you a loud and/or regular snorer? []  Yes       [x] No    2. Have you been observed to gasp or stop breathing during sleep? []  Yes       [x] No    3. Do you feel tired or groggy upon awakening or do you awaken with a headache?           []  Yes       [x] No    4. Are you often tired or fatigued during the wake time hours? []  Yes       [x] No    5. Do you fall asleep sitting, reading, watching TV or driving? []  Yes       [x] No    6. Do you often have problems with memory or concentration? []  Yes       [x] No    **If patient's score is ? 3 they are considered high risk for EFE. Notify the anesthesiologist of the high risk and document in focus note. Note:  If the patient's BMI is more than 35 kg m¯² , has neck circumference > 40 cm, and/or high blood pressure the risk is greater (© American Sleep Apnea Association, 2006).

## 2019-07-25 ENCOUNTER — HOSPITAL ENCOUNTER (OUTPATIENT)
Dept: CT IMAGING | Age: 29
Discharge: HOME | End: 2019-07-25
Payer: COMMERCIAL

## 2019-07-25 ENCOUNTER — HOSPITAL ENCOUNTER (OUTPATIENT)
Dept: GENERAL RADIOLOGY | Age: 29
Discharge: HOME | End: 2019-07-25
Payer: COMMERCIAL

## 2019-07-25 ENCOUNTER — HOSPITAL ENCOUNTER (OUTPATIENT)
Dept: PREADMISSION TESTING | Age: 29
Discharge: HOME | End: 2019-07-29
Payer: COMMERCIAL

## 2019-07-25 DIAGNOSIS — C56.1 DYSGERMINOMA OF OVARY, RIGHT (HCC): ICD-10-CM

## 2019-07-25 DIAGNOSIS — Z01.811 PRE-OP CHEST EXAM: ICD-10-CM

## 2019-07-25 LAB
ABO/RH: NORMAL
ANION GAP SERPL CALCULATED.3IONS-SCNC: 11 MMOL/L (ref 3–16)
ANTIBODY SCREEN: NORMAL
BASOPHILS ABSOLUTE: 0 K/UL (ref 0–0.2)
BASOPHILS RELATIVE PERCENT: 0.7 %
BUN BLDV-MCNC: 9 MG/DL (ref 7–20)
CALCIUM SERPL-MCNC: 9.2 MG/DL (ref 8.3–10.6)
CHLORIDE BLD-SCNC: 101 MMOL/L (ref 99–110)
CO2: 26 MMOL/L (ref 21–32)
CREAT SERPL-MCNC: 0.5 MG/DL (ref 0.6–1.1)
EOSINOPHILS ABSOLUTE: 0.1 K/UL (ref 0–0.6)
EOSINOPHILS RELATIVE PERCENT: 2.4 %
GFR AFRICAN AMERICAN: >60
GFR NON-AFRICAN AMERICAN: >60
GLUCOSE BLD-MCNC: 85 MG/DL (ref 70–99)
HCT VFR BLD CALC: 37.5 % (ref 36–48)
HEMOGLOBIN: 12.6 G/DL (ref 12–16)
LYMPHOCYTES ABSOLUTE: 1.1 K/UL (ref 1–5.1)
LYMPHOCYTES RELATIVE PERCENT: 18.4 %
MCH RBC QN AUTO: 30.3 PG (ref 26–34)
MCHC RBC AUTO-ENTMCNC: 33.5 G/DL (ref 31–36)
MCV RBC AUTO: 90.3 FL (ref 80–100)
MONOCYTES ABSOLUTE: 0.7 K/UL (ref 0–1.3)
MONOCYTES RELATIVE PERCENT: 11.1 %
NEUTROPHILS ABSOLUTE: 4.1 K/UL (ref 1.7–7.7)
NEUTROPHILS RELATIVE PERCENT: 67.4 %
PDW BLD-RTO: 13.5 % (ref 12.4–15.4)
PLATELET # BLD: 214 K/UL (ref 135–450)
PMV BLD AUTO: 9.1 FL (ref 5–10.5)
POTASSIUM SERPL-SCNC: 4 MMOL/L (ref 3.5–5.1)
RBC # BLD: 4.16 M/UL (ref 4–5.2)
SODIUM BLD-SCNC: 138 MMOL/L (ref 136–145)
WBC # BLD: 6.1 K/UL (ref 4–11)

## 2019-07-25 PROCEDURE — 71046 X-RAY EXAM CHEST 2 VIEWS: CPT

## 2019-07-25 PROCEDURE — 86901 BLOOD TYPING SEROLOGIC RH(D): CPT

## 2019-07-25 PROCEDURE — 86900 BLOOD TYPING SEROLOGIC ABO: CPT

## 2019-07-25 PROCEDURE — 86850 RBC ANTIBODY SCREEN: CPT

## 2019-07-25 PROCEDURE — 6360000004 HC RX CONTRAST MEDICATION: Performed by: OBSTETRICS & GYNECOLOGY

## 2019-07-25 PROCEDURE — 85025 COMPLETE CBC W/AUTO DIFF WBC: CPT

## 2019-07-25 PROCEDURE — 74177 CT ABD & PELVIS W/CONTRAST: CPT

## 2019-07-25 PROCEDURE — 80048 BASIC METABOLIC PNL TOTAL CA: CPT

## 2019-07-25 PROCEDURE — 36415 COLL VENOUS BLD VENIPUNCTURE: CPT

## 2019-07-25 PROCEDURE — 93005 ELECTROCARDIOGRAM TRACING: CPT | Performed by: OBSTETRICS & GYNECOLOGY

## 2019-07-25 RX ADMIN — IOPAMIDOL 75 ML: 755 INJECTION, SOLUTION INTRAVENOUS at 09:05

## 2019-07-25 RX ADMIN — IOHEXOL 50 ML: 240 INJECTION, SOLUTION INTRATHECAL; INTRAVASCULAR; INTRAVENOUS; ORAL at 09:05

## 2019-07-26 LAB
EKG ATRIAL RATE: 70 BPM
EKG DIAGNOSIS: NORMAL
EKG P AXIS: 52 DEGREES
EKG P-R INTERVAL: 130 MS
EKG Q-T INTERVAL: 382 MS
EKG QRS DURATION: 78 MS
EKG QTC CALCULATION (BAZETT): 412 MS
EKG R AXIS: 87 DEGREES
EKG T AXIS: 57 DEGREES
EKG VENTRICULAR RATE: 70 BPM

## 2019-07-26 PROCEDURE — 93010 ELECTROCARDIOGRAM REPORT: CPT | Performed by: INTERNAL MEDICINE

## 2019-07-29 ENCOUNTER — HOSPITAL ENCOUNTER (OUTPATIENT)
Dept: PULMONOLOGY | Age: 29
Discharge: HOME | End: 2019-07-29
Payer: COMMERCIAL

## 2019-07-29 PROCEDURE — 94010 BREATHING CAPACITY TEST: CPT

## 2019-07-29 PROCEDURE — 94726 PLETHYSMOGRAPHY LUNG VOLUMES: CPT

## 2019-07-29 PROCEDURE — 94729 DIFFUSING CAPACITY: CPT

## 2019-07-30 ENCOUNTER — HOSPITAL ENCOUNTER (OUTPATIENT)
Dept: GENERAL RADIOLOGY | Age: 29
Discharge: HOME | End: 2019-07-30
Attending: OBSTETRICS & GYNECOLOGY
Payer: COMMERCIAL

## 2019-07-30 ENCOUNTER — ANESTHESIA EVENT (OUTPATIENT)
Dept: OPERATING ROOM | Age: 29
End: 2019-07-30
Payer: COMMERCIAL

## 2019-07-30 ENCOUNTER — ANESTHESIA (OUTPATIENT)
Dept: OPERATING ROOM | Age: 29
End: 2019-07-30
Payer: COMMERCIAL

## 2019-07-30 ENCOUNTER — APPOINTMENT (OUTPATIENT)
Dept: GENERAL RADIOLOGY | Age: 29
End: 2019-07-30
Attending: OBSTETRICS & GYNECOLOGY
Payer: COMMERCIAL

## 2019-07-30 ENCOUNTER — HOSPITAL ENCOUNTER (OUTPATIENT)
Age: 29
Setting detail: OUTPATIENT SURGERY
Discharge: HOME | End: 2019-07-30
Attending: OBSTETRICS & GYNECOLOGY | Admitting: OBSTETRICS & GYNECOLOGY
Payer: COMMERCIAL

## 2019-07-30 VITALS
TEMPERATURE: 97.8 F | SYSTOLIC BLOOD PRESSURE: 106 MMHG | RESPIRATION RATE: 20 BRPM | HEART RATE: 68 BPM | DIASTOLIC BLOOD PRESSURE: 73 MMHG | BODY MASS INDEX: 22.26 KG/M2 | OXYGEN SATURATION: 100 % | HEIGHT: 62 IN | WEIGHT: 121 LBS

## 2019-07-30 VITALS — SYSTOLIC BLOOD PRESSURE: 90 MMHG | TEMPERATURE: 95.9 F | OXYGEN SATURATION: 100 % | DIASTOLIC BLOOD PRESSURE: 61 MMHG

## 2019-07-30 DIAGNOSIS — Z95.828 PORT-A-CATH IN PLACE: ICD-10-CM

## 2019-07-30 LAB
ABO/RH: NORMAL
ANTIBODY SCREEN: NORMAL
DLCO %PRED: 89 %
DLCO PRED: NORMAL ML/MIN/MMHG
DLCO/VA %PRED: NORMAL %
DLCO/VA PRED: NORMAL ML/MIN/MMHG
DLCO/VA: NORMAL ML/MIN/MMHG
DLCO: NORMAL ML/MIN/MMHG
EXPIRATORY TIME: NORMAL SEC
FEF 25-75% %PRED-PRE: NORMAL L/SEC
FEF 25-75% PRED: NORMAL L/SEC
FEF 25-75%-PRE: NORMAL L/SEC
FEV1 %PRED-PRE: 88 %
FEV1 PRED: NORMAL L
FEV1/FVC %PRED-PRE: NORMAL %
FEV1/FVC PRED: NORMAL %
FEV1/FVC: 85 %
FEV1: NORMAL L
FVC %PRED-PRE: NORMAL %
FVC PRED: NORMAL L
FVC: NORMAL L
GAW %PRED: NORMAL %
GAW PRED: NORMAL L/S/CMH2O
GAW: NORMAL L/S/CMH2O
IC %PRED: NORMAL %
IC PRED: NORMAL L
IC: NORMAL L
MVV %PRED-PRE: NORMAL %
MVV PRED: NORMAL L/MIN
MVV-PRE: NORMAL L/MIN
PEF %PRED-PRE: NORMAL L/SEC
PEF PRED: NORMAL L/SEC
PEF-PRE: NORMAL L/SEC
PREGNANCY, URINE: NEGATIVE
RAW %PRED: NORMAL %
RAW PRED: NORMAL CMH2O/L/S
RAW: NORMAL CMH2O/L/S
RV %PRED: NORMAL %
RV PRED: NORMAL L
RV: NORMAL L
SVC %PRED: NORMAL %
SVC PRED: NORMAL L
SVC: NORMAL L
TLC %PRED: 77 %
TLC PRED: NORMAL L
TLC: NORMAL L
VA %PRED: NORMAL %
VA PRED: NORMAL L
VA: NORMAL L
VTG %PRED: NORMAL %
VTG PRED: NORMAL L
VTG: NORMAL L

## 2019-07-30 PROCEDURE — 2580000003 HC RX 258: Performed by: ANESTHESIOLOGY

## 2019-07-30 PROCEDURE — 2580000003 HC RX 258: Performed by: OBSTETRICS & GYNECOLOGY

## 2019-07-30 PROCEDURE — 3700000000 HC ANESTHESIA ATTENDED CARE: Performed by: OBSTETRICS & GYNECOLOGY

## 2019-07-30 PROCEDURE — 6360000002 HC RX W HCPCS: Performed by: OBSTETRICS & GYNECOLOGY

## 2019-07-30 PROCEDURE — 84703 CHORIONIC GONADOTROPIN ASSAY: CPT

## 2019-07-30 PROCEDURE — 7100000011 HC PHASE II RECOVERY - ADDTL 15 MIN: Performed by: OBSTETRICS & GYNECOLOGY

## 2019-07-30 PROCEDURE — 2500000003 HC RX 250 WO HCPCS: Performed by: NURSE ANESTHETIST, CERTIFIED REGISTERED

## 2019-07-30 PROCEDURE — 2709999900 HC NON-CHARGEABLE SUPPLY: Performed by: OBSTETRICS & GYNECOLOGY

## 2019-07-30 PROCEDURE — 6360000002 HC RX W HCPCS: Performed by: NURSE ANESTHETIST, CERTIFIED REGISTERED

## 2019-07-30 PROCEDURE — 71045 X-RAY EXAM CHEST 1 VIEW: CPT

## 2019-07-30 PROCEDURE — 7100000010 HC PHASE II RECOVERY - FIRST 15 MIN: Performed by: OBSTETRICS & GYNECOLOGY

## 2019-07-30 PROCEDURE — C1788 PORT, INDWELLING, IMP: HCPCS | Performed by: OBSTETRICS & GYNECOLOGY

## 2019-07-30 PROCEDURE — 77001 FLUOROGUIDE FOR VEIN DEVICE: CPT

## 2019-07-30 PROCEDURE — 3700000001 HC ADD 15 MINUTES (ANESTHESIA): Performed by: OBSTETRICS & GYNECOLOGY

## 2019-07-30 PROCEDURE — 2500000003 HC RX 250 WO HCPCS: Performed by: OBSTETRICS & GYNECOLOGY

## 2019-07-30 PROCEDURE — 3600000004 HC SURGERY LEVEL 4 BASE: Performed by: OBSTETRICS & GYNECOLOGY

## 2019-07-30 PROCEDURE — 3600000014 HC SURGERY LEVEL 4 ADDTL 15MIN: Performed by: OBSTETRICS & GYNECOLOGY

## 2019-07-30 PROCEDURE — 86901 BLOOD TYPING SEROLOGIC RH(D): CPT

## 2019-07-30 PROCEDURE — 86900 BLOOD TYPING SEROLOGIC ABO: CPT

## 2019-07-30 PROCEDURE — 3209999900 FLUORO FOR SURGICAL PROCEDURES

## 2019-07-30 PROCEDURE — 86850 RBC ANTIBODY SCREEN: CPT

## 2019-07-30 DEVICE — PORT INFUS SGL LUMN ATTCH POLYUR OPN END CATH 8FR POWERPRT: Type: IMPLANTABLE DEVICE | Site: CHEST | Status: FUNCTIONAL

## 2019-07-30 RX ORDER — MIDAZOLAM HYDROCHLORIDE 1 MG/ML
INJECTION INTRAMUSCULAR; INTRAVENOUS PRN
Status: DISCONTINUED | OUTPATIENT
Start: 2019-07-30 | End: 2019-07-30 | Stop reason: SDUPTHER

## 2019-07-30 RX ORDER — SODIUM CHLORIDE 0.9 % (FLUSH) 0.9 %
10 SYRINGE (ML) INJECTION EVERY 12 HOURS SCHEDULED
Status: DISCONTINUED | OUTPATIENT
Start: 2019-07-30 | End: 2019-07-30 | Stop reason: HOSPADM

## 2019-07-30 RX ORDER — ONDANSETRON 2 MG/ML
INJECTION INTRAMUSCULAR; INTRAVENOUS PRN
Status: DISCONTINUED | OUTPATIENT
Start: 2019-07-30 | End: 2019-07-30 | Stop reason: SDUPTHER

## 2019-07-30 RX ORDER — SODIUM CHLORIDE 0.9 % (FLUSH) 0.9 %
10 SYRINGE (ML) INJECTION PRN
Status: CANCELLED | OUTPATIENT
Start: 2019-07-30

## 2019-07-30 RX ORDER — PROPOFOL 10 MG/ML
INJECTION, EMULSION INTRAVENOUS CONTINUOUS PRN
Status: DISCONTINUED | OUTPATIENT
Start: 2019-07-30 | End: 2019-07-30 | Stop reason: SDUPTHER

## 2019-07-30 RX ORDER — LIDOCAINE HYDROCHLORIDE 20 MG/ML
INJECTION, SOLUTION INFILTRATION; PERINEURAL PRN
Status: DISCONTINUED | OUTPATIENT
Start: 2019-07-30 | End: 2019-07-30 | Stop reason: SDUPTHER

## 2019-07-30 RX ORDER — ONDANSETRON 2 MG/ML
4 INJECTION INTRAMUSCULAR; INTRAVENOUS
Status: DISCONTINUED | OUTPATIENT
Start: 2019-07-30 | End: 2019-07-30 | Stop reason: HOSPADM

## 2019-07-30 RX ORDER — SODIUM CHLORIDE 0.9 % (FLUSH) 0.9 %
10 SYRINGE (ML) INJECTION EVERY 12 HOURS SCHEDULED
Status: CANCELLED | OUTPATIENT
Start: 2019-07-30

## 2019-07-30 RX ORDER — PROPOFOL 10 MG/ML
INJECTION, EMULSION INTRAVENOUS PRN
Status: DISCONTINUED | OUTPATIENT
Start: 2019-07-30 | End: 2019-07-30 | Stop reason: SDUPTHER

## 2019-07-30 RX ORDER — PROCHLORPERAZINE EDISYLATE 5 MG/ML
5 INJECTION INTRAMUSCULAR; INTRAVENOUS
Status: DISCONTINUED | OUTPATIENT
Start: 2019-07-30 | End: 2019-07-30 | Stop reason: HOSPADM

## 2019-07-30 RX ORDER — BUPIVACAINE HYDROCHLORIDE AND EPINEPHRINE 5; 5 MG/ML; UG/ML
INJECTION, SOLUTION PERINEURAL PRN
Status: DISCONTINUED | OUTPATIENT
Start: 2019-07-30 | End: 2019-07-30 | Stop reason: ALTCHOICE

## 2019-07-30 RX ORDER — MORPHINE SULFATE 4 MG/ML
3 INJECTION, SOLUTION INTRAMUSCULAR; INTRAVENOUS
Status: ACTIVE | OUTPATIENT
Start: 2019-07-30 | End: 2019-07-30

## 2019-07-30 RX ORDER — FENTANYL CITRATE 50 UG/ML
INJECTION, SOLUTION INTRAMUSCULAR; INTRAVENOUS PRN
Status: DISCONTINUED | OUTPATIENT
Start: 2019-07-30 | End: 2019-07-30 | Stop reason: SDUPTHER

## 2019-07-30 RX ORDER — SODIUM CHLORIDE, SODIUM LACTATE, POTASSIUM CHLORIDE, CALCIUM CHLORIDE 600; 310; 30; 20 MG/100ML; MG/100ML; MG/100ML; MG/100ML
INJECTION, SOLUTION INTRAVENOUS CONTINUOUS
Status: CANCELLED | OUTPATIENT
Start: 2019-07-30

## 2019-07-30 RX ORDER — HYDRALAZINE HYDROCHLORIDE 20 MG/ML
10 INJECTION INTRAMUSCULAR; INTRAVENOUS EVERY 10 MIN PRN
Status: DISCONTINUED | OUTPATIENT
Start: 2019-07-30 | End: 2019-07-30 | Stop reason: HOSPADM

## 2019-07-30 RX ORDER — SODIUM CHLORIDE, SODIUM LACTATE, POTASSIUM CHLORIDE, CALCIUM CHLORIDE 600; 310; 30; 20 MG/100ML; MG/100ML; MG/100ML; MG/100ML
INJECTION, SOLUTION INTRAVENOUS CONTINUOUS
Status: DISCONTINUED | OUTPATIENT
Start: 2019-07-30 | End: 2019-07-30 | Stop reason: HOSPADM

## 2019-07-30 RX ORDER — LIDOCAINE HYDROCHLORIDE 10 MG/ML
0.3 INJECTION, SOLUTION EPIDURAL; INFILTRATION; INTRACAUDAL; PERINEURAL
Status: DISCONTINUED | OUTPATIENT
Start: 2019-07-30 | End: 2019-07-30 | Stop reason: HOSPADM

## 2019-07-30 RX ORDER — MEPERIDINE HYDROCHLORIDE 50 MG/ML
12.5 INJECTION INTRAMUSCULAR; INTRAVENOUS; SUBCUTANEOUS EVERY 5 MIN PRN
Status: DISCONTINUED | OUTPATIENT
Start: 2019-07-30 | End: 2019-07-30 | Stop reason: HOSPADM

## 2019-07-30 RX ORDER — SODIUM CHLORIDE 0.9 % (FLUSH) 0.9 %
10 SYRINGE (ML) INJECTION PRN
Status: DISCONTINUED | OUTPATIENT
Start: 2019-07-30 | End: 2019-07-30 | Stop reason: HOSPADM

## 2019-07-30 RX ORDER — OXYCODONE HYDROCHLORIDE 5 MG/1
5 TABLET ORAL
Status: DISCONTINUED | OUTPATIENT
Start: 2019-07-30 | End: 2019-07-30 | Stop reason: HOSPADM

## 2019-07-30 RX ADMIN — CEFAZOLIN 1 G: 1 INJECTION, POWDER, FOR SOLUTION INTRAMUSCULAR; INTRAVENOUS at 14:28

## 2019-07-30 RX ADMIN — PROPOFOL 120 MCG/KG/MIN: 10 INJECTION, EMULSION INTRAVENOUS at 14:31

## 2019-07-30 RX ADMIN — PROPOFOL 40 MG: 10 INJECTION, EMULSION INTRAVENOUS at 14:32

## 2019-07-30 RX ADMIN — LIDOCAINE HYDROCHLORIDE 60 MG: 20 INJECTION, SOLUTION INFILTRATION; PERINEURAL at 14:30

## 2019-07-30 RX ADMIN — SODIUM CHLORIDE, POTASSIUM CHLORIDE, SODIUM LACTATE AND CALCIUM CHLORIDE: 600; 310; 30; 20 INJECTION, SOLUTION INTRAVENOUS at 14:05

## 2019-07-30 RX ADMIN — MIDAZOLAM HYDROCHLORIDE 2 MG: 2 INJECTION, SOLUTION INTRAMUSCULAR; INTRAVENOUS at 14:28

## 2019-07-30 RX ADMIN — ONDANSETRON 4 MG: 2 INJECTION INTRAMUSCULAR; INTRAVENOUS at 14:39

## 2019-07-30 RX ADMIN — FENTANYL CITRATE 100 MCG: 50 INJECTION INTRAMUSCULAR; INTRAVENOUS at 14:31

## 2019-07-30 ASSESSMENT — PULMONARY FUNCTION TESTS
PIF_VALUE: 0
PIF_VALUE: 3
PIF_VALUE: 0
PIF_VALUE: 1
PIF_VALUE: 0
PIF_VALUE: 1
PIF_VALUE: 19
PIF_VALUE: 0
FEV1/FVC: 85
PIF_VALUE: 0
PIF_VALUE: 1
PIF_VALUE: 0
PIF_VALUE: 2
PIF_VALUE: 0
PIF_VALUE: 1
PIF_VALUE: 0
FEV1_PERCENT_PREDICTED_PRE: 88
PIF_VALUE: 0
PIF_VALUE: 0

## 2019-07-30 ASSESSMENT — PAIN - FUNCTIONAL ASSESSMENT: PAIN_FUNCTIONAL_ASSESSMENT: 0-10

## 2019-07-30 ASSESSMENT — LIFESTYLE VARIABLES: SMOKING_STATUS: 0

## 2019-07-30 ASSESSMENT — PAIN SCALES - GENERAL: PAINLEVEL_OUTOF10: 0

## 2019-07-30 NOTE — OP NOTE
sheath and the dilator sheath was then removed. The tip of the catheter was noted at the atriocaval junction under fluoroscopy and good blood return was noted through the catheter. The catheter was at 18 cm at the skin edge. The catheter was then trimmed and attached to the port. The port was tied into the port pocket with 2-0 prolene x 3. The port was easily flushed with heparin and blood return was noted. The port was once again flushed with heparin-saline. The deep subcutaneous tissue was closed with 3-0 vicryl in a running fashion. The skin was closed with 4-0 vicryl in a running fashion. Steri strips and bandages were placed. Sponge, lap, needle and instrument counts were correct x 2. The patient was taken to recovery room in awake and stable condition.     SHERIF LOVE DO  GYN ONCOLOGY  [facility] [phone]

## 2019-07-30 NOTE — ANESTHESIA PRE PROCEDURE
Allergies   Allergen Reactions    Pcn [Penicillins] Shortness Of Breath and Rash       Problem List:    Patient Active Problem List   Diagnosis Code    Right ovarian epithelial cancer ([facility] C56.1       Past Medical History:        Diagnosis Date    [facility] 7/2/2019       Past Surgical History:        Procedure Laterality Date    DENTAL SURGERY      impacted tooth    SALPINGO-OOPHORECTOMY Right 07/02/2019    EXPLOROTORY LAPAROTOMY, RIGHT SALPINGO OOPHORECTOMY,   PELVIC WASHINGS, BIOPSIES OF CECUM SEROSA. SMALL BOWEL SEROSA, OMENTUM performed by Sheena Mohs, MD at [facility] Right 7/2/2019    INCORRECT PROCEDURE       Social History:    Social History     Tobacco Use    Smoking status: Never Smoker    Smokeless tobacco: Never Used   Substance Use Topics    Alcohol use: Yes     Comment: occ                                Counseling given: Not Answered      Vital Signs (Current):   Vitals:    07/24/19 1435 07/30/19 1228   BP:  113/83   Pulse:  77   Resp:  20   Temp:  98.4 °F (36.9 °C)   TempSrc:  Temporal   SpO2:  100%   Weight: 125 lb (56.7 kg) 121 lb (54.9 kg)   Height:  5' 2\" (1.575 m)                                              BP Readings from Last 3 Encounters:   07/30/19 113/83   07/04/19 102/70   07/02/19 95/66       NPO Status:                                                                                 BMI:   Wt Readings from Last 3 Encounters:   07/30/19 121 lb (54.9 kg)   07/02/19 125 lb 4.8 oz (56.8 kg)     Body mass index is 22.13 kg/m².     CBC:   Lab Results   Component Value Date    WBC 6.1 07/25/2019    RBC 4.16 07/25/2019    HGB 12.6 07/25/2019    HCT 37.5 07/25/2019    MCV 90.3 07/25/2019    RDW 13.5 07/25/2019     07/25/2019       CMP:   Lab Results   Component Value Date     07/25/2019    K 4.0 07/25/2019     07/25/2019    CO2 26 07/25/2019    BUN 9 07/25/2019    CREATININE 0.5 07/25/2019    GFRAA >60 07/25/2019

## 2019-07-30 NOTE — H&P
palpated, no hepatosplenomgally. Incision healing well. MUSCULOSKELETAL: full range of motion noted, tone is normal   NEUROLOGIC: awake, alert, oriented to name, place and time. Motor skills grossly intact. SKIN: Normal skin color, texture, turgor and no jaundice. appears intact   EXTREMITIES: no LE edema     Labs    Imaging   6/24/19: pelvic US: Uterus: Measures approximately 8.6 x 4 x 2.5 cm. ES 9mm. RO not seen. LO not seen. Predominantly centered within the right adnexa is an irregular heterogeneous mass which demonstrates internal vascularity. This measures up to 11 cm in greatest dimension. This mass is separate from the uterus. There is a small amount of free fluid present in the pelvis. 6/27/19: pelvic MRI: Uterus: The uterus is anteverted. Measurements are 8.3 x 2.9 x 5.1 cm. ES 8mm. Myometrium: No myometrial abnormalities are appreciated. Vagina: Normal. Ovaries: The left ovary is normal in appearance. Measurements are approximately 2.2 x 2.0 x 1.9 cm. The right ovary is not identified. There is a large, complex mass centered in the right adnexa with measurements approximately 11.4 x 9.5 x 8.4 cm. The mass is almost completely solid with some areas of cystic degeneration internally. Multiple septations and prominent lobularity is present. Internal fat can not be definitively confirmed. Postcontrast enhancement is demonstrated. No discrete lymph nodes are identified to be enlarged. Fluid: Moderate free fluid is observed in the right adnexa and pelvis. Pathology:   7/2/19:   FINAL DIAGNOSIS:  A. 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 - Unremarkable fallopian tube. B. Cecal serosa, biopsy:  - Fibroadipose tissue and congested mesothelium.  - No tumor seen.     C. Small bowel serosa, biopsy:  - Hemorrhagic and inflamed fibroadipose tissue and mesothelial tissue.  - No tumor

## 2019-08-05 LAB
CA 125: 15.9 U/ML (ref 0–35)
GONADOTROPIN, CHORIONIC (HCG) QUANT: <5 MIU/ML
LACTATE DEHYDROGENASE: 180 U/L (ref 100–190)

## 2019-08-07 LAB — AFP: 3 UG/L

## 2019-08-22 ENCOUNTER — TELEPHONE (OUTPATIENT)
Dept: PULMONOLOGY | Age: 29
End: 2019-08-22

## 2019-08-23 ENCOUNTER — HOSPITAL ENCOUNTER (OUTPATIENT)
Dept: PULMONOLOGY | Age: 29
Discharge: HOME | End: 2019-08-23
Payer: COMMERCIAL

## 2019-08-23 VITALS — RESPIRATION RATE: 16 BRPM | HEART RATE: 98 BPM

## 2019-08-23 PROCEDURE — 94726 PLETHYSMOGRAPHY LUNG VOLUMES: CPT

## 2019-08-23 PROCEDURE — 94760 N-INVAS EAR/PLS OXIMETRY 1: CPT

## 2019-08-23 PROCEDURE — 94729 DIFFUSING CAPACITY: CPT

## 2019-08-26 LAB
CA 125: 9.8 U/ML (ref 0–35)
GONADOTROPIN, CHORIONIC (HCG) QUANT: <5 MIU/ML
LACTATE DEHYDROGENASE: 237 U/L (ref 100–190)

## 2019-08-27 LAB
DLCO %PRED: 122 %
DLCO PRED: NORMAL ML/MIN/MMHG
DLCO/VA %PRED: NORMAL %
DLCO/VA PRED: NORMAL ML/MIN/MMHG
DLCO/VA: NORMAL ML/MIN/MMHG
DLCO: NORMAL ML/MIN/MMHG
EXPIRATORY TIME: NORMAL SEC
FEF 25-75% %PRED-PRE: NORMAL L/SEC
FEF 25-75% PRED: NORMAL L/SEC
FEF 25-75%-PRE: NORMAL L/SEC
FEV1 %PRED-PRE: 88 %
FEV1 PRED: NORMAL L
FEV1/FVC %PRED-PRE: NORMAL %
FEV1/FVC PRED: NORMAL %
FEV1/FVC: 83 %
FEV1: NORMAL L
FVC %PRED-PRE: 97 %
FVC PRED: NORMAL L
FVC: NORMAL L
GAW %PRED: NORMAL %
GAW PRED: NORMAL L/S/CMH2O
GAW: NORMAL L/S/CMH2O
IC %PRED: NORMAL %
IC PRED: NORMAL L
IC: NORMAL L
MVV %PRED-PRE: NORMAL %
MVV PRED: NORMAL L/MIN
MVV-PRE: NORMAL L/MIN
PEF %PRED-PRE: NORMAL L/SEC
PEF PRED: NORMAL L/SEC
PEF-PRE: NORMAL L/SEC
RAW %PRED: NORMAL %
RAW PRED: NORMAL CMH2O/L/S
RAW: NORMAL CMH2O/L/S
RV %PRED: NORMAL %
RV PRED: NORMAL L
RV: NORMAL L
SVC %PRED: NORMAL %
SVC PRED: NORMAL L
SVC: NORMAL L
TLC %PRED: 73 %
TLC PRED: NORMAL L
TLC: NORMAL L
VA %PRED: NORMAL %
VA PRED: NORMAL L
VA: NORMAL L
VTG %PRED: NORMAL %
VTG PRED: NORMAL L
VTG: NORMAL L

## 2019-08-27 ASSESSMENT — PULMONARY FUNCTION TESTS
FEV1_PERCENT_PREDICTED_PRE: 88
FVC_PERCENT_PREDICTED_PRE: 97
FEV1/FVC: 83

## 2019-08-27 NOTE — PROCEDURES
[address] [address]                               PULMONARY FUNCTION    PATIENT NAME: Jeremiah Menon                  :        1990  MED REC NO:   [id number]                          ROOM:  ACCOUNT NO:   [de-identified]                           ADMIT DATE: 2019  PROVIDER:     Elma Estrella MD    DATE OF PROCEDURE:  2019    This is a 44-year-old female. TEST PERFORMED:  1. Spirometry of flow-volume loops obtained. 2.  Lung volumes by plethysmography. 3.  Diffusion capacity of carbon monoxide. Test meets ATS criteria and the quality of flow-volume loops is  sufficient for interpretation. Good patient effort. The FEV1 is 2.69 L or 88% predicted. The FEV1 to FVC ratio is 83. Total lung capacity is 73% predicted and diffusion is 122% predicted. INTERPRETATION:  1. No obstruction. 2.  Reduced lung volumes suggestive of a restrictive impairment. 3.  Normal diffusion capacity. 4.  Clinical correlation recommended, findings identify isolated  restriction, which can be seen with interstitial lung disease,  neuromuscular weakness, and obesity.         Haydee Snell MD    D: 2019 15:54:49       T: 2019 1:17:06     [email or web address]  Job#: [id number]     Doc#: [id number]    CC:

## 2019-08-28 LAB — AFP: 3.7 UG/L

## 2019-09-16 LAB
GONADOTROPIN, CHORIONIC (HCG) QUANT: <5 MIU/ML
LACTATE DEHYDROGENASE: 233 U/L (ref 100–190)

## 2019-09-20 LAB — AFP: 3.5 UG/L

## 2019-09-24 ENCOUNTER — HOSPITAL ENCOUNTER (OUTPATIENT)
Dept: PULMONOLOGY | Age: 29
Discharge: HOME | End: 2019-09-24
Payer: COMMERCIAL

## 2019-09-24 VITALS — OXYGEN SATURATION: 99 %

## 2019-09-24 PROCEDURE — 94726 PLETHYSMOGRAPHY LUNG VOLUMES: CPT

## 2019-09-24 PROCEDURE — 94760 N-INVAS EAR/PLS OXIMETRY 1: CPT

## 2019-09-24 PROCEDURE — 94729 DIFFUSING CAPACITY: CPT

## 2019-09-30 LAB
DLCO %PRED: 76 %
DLCO PRED: NORMAL ML/MIN/MMHG
DLCO/VA %PRED: NORMAL %
DLCO/VA PRED: NORMAL ML/MIN/MMHG
DLCO/VA: NORMAL ML/MIN/MMHG
DLCO: NORMAL ML/MIN/MMHG
EXPIRATORY TIME: NORMAL SEC
FEF 25-75% %PRED-PRE: NORMAL L/SEC
FEF 25-75% PRED: NORMAL L/SEC
FEF 25-75%-PRE: NORMAL L/SEC
FEV1 %PRED-PRE: 83 %
FEV1 PRED: NORMAL L
FEV1/FVC %PRED-PRE: NORMAL %
FEV1/FVC PRED: NORMAL %
FEV1/FVC: 85 %
FEV1: NORMAL L
FVC %PRED-PRE: 84 %
FVC PRED: NORMAL L
FVC: NORMAL L
GAW %PRED: NORMAL %
GAW PRED: NORMAL L/S/CMH2O
GAW: NORMAL L/S/CMH2O
IC %PRED: NORMAL %
IC PRED: NORMAL L
IC: NORMAL L
MVV %PRED-PRE: NORMAL %
MVV PRED: NORMAL L/MIN
MVV-PRE: NORMAL L/MIN
PEF %PRED-PRE: NORMAL L/SEC
PEF PRED: NORMAL L/SEC
PEF-PRE: NORMAL L/SEC
RAW %PRED: NORMAL %
RAW PRED: NORMAL CMH2O/L/S
RAW: NORMAL CMH2O/L/S
RV %PRED: NORMAL %
RV PRED: NORMAL L
RV: NORMAL L
SVC %PRED: NORMAL %
SVC PRED: NORMAL L
SVC: NORMAL L
TLC %PRED: 81 %
TLC PRED: NORMAL L
TLC: NORMAL L
VA %PRED: NORMAL %
VA PRED: NORMAL L
VA: NORMAL L
VTG %PRED: NORMAL %
VTG PRED: NORMAL L
VTG: NORMAL L

## 2019-09-30 ASSESSMENT — PULMONARY FUNCTION TESTS
FVC_PERCENT_PREDICTED_PRE: 84
FEV1_PERCENT_PREDICTED_PRE: 83
FEV1/FVC: 85

## 2019-10-11 LAB
CA 125: 8.3 U/ML (ref 0–35)
GONADOTROPIN, CHORIONIC (HCG) QUANT: <5 MIU/ML
LACTATE DEHYDROGENASE: 203 U/L (ref 100–190)

## 2019-10-16 LAB — AFP: 3.9 UG/L

## 2019-10-23 ENCOUNTER — HOSPITAL ENCOUNTER (OUTPATIENT)
Dept: CT IMAGING | Age: 29
Discharge: HOME | End: 2019-10-23
Payer: COMMERCIAL

## 2019-10-23 DIAGNOSIS — C56.1 MALIGNANT NEOPLASM OF RIGHT OVARY (HCC): ICD-10-CM

## 2019-10-23 PROCEDURE — 74177 CT ABD & PELVIS W/CONTRAST: CPT

## 2019-10-23 PROCEDURE — 6360000004 HC RX CONTRAST MEDICATION: Performed by: OBSTETRICS & GYNECOLOGY

## 2019-10-23 RX ADMIN — IOHEXOL 50 ML: 240 INJECTION, SOLUTION INTRATHECAL; INTRAVASCULAR; INTRAVENOUS; ORAL at 10:17

## 2019-10-23 RX ADMIN — IOPAMIDOL 100 ML: 755 INJECTION, SOLUTION INTRAVENOUS at 10:18

## 2019-10-29 LAB
CA 125: 8.1 U/ML (ref 0–35)
GONADOTROPIN, CHORIONIC (HCG) QUANT: <5 MIU/ML
LACTATE DEHYDROGENASE: 200 U/L (ref 100–190)

## 2019-10-31 LAB — AFP: 4.3 UG/L

## 2020-01-08 ENCOUNTER — HOSPITAL ENCOUNTER (OUTPATIENT)
Dept: CT IMAGING | Age: 30
Discharge: HOME | End: 2020-01-08
Payer: COMMERCIAL

## 2020-01-08 ENCOUNTER — HOSPITAL ENCOUNTER (OUTPATIENT)
Age: 30
Discharge: HOME | End: 2020-01-08
Payer: COMMERCIAL

## 2020-01-08 ENCOUNTER — HOSPITAL ENCOUNTER (OUTPATIENT)
Dept: GENERAL RADIOLOGY | Age: 30
Discharge: HOME | End: 2020-01-08
Payer: COMMERCIAL

## 2020-01-08 PROCEDURE — 6360000004 HC RX CONTRAST MEDICATION: Performed by: OBSTETRICS & GYNECOLOGY

## 2020-01-08 PROCEDURE — 71046 X-RAY EXAM CHEST 2 VIEWS: CPT

## 2020-01-08 PROCEDURE — 74177 CT ABD & PELVIS W/CONTRAST: CPT

## 2020-01-08 RX ADMIN — IOHEXOL 50 ML: 240 INJECTION, SOLUTION INTRATHECAL; INTRAVASCULAR; INTRAVENOUS; ORAL at 08:45

## 2020-01-08 RX ADMIN — IOPAMIDOL 75 ML: 755 INJECTION, SOLUTION INTRAVENOUS at 08:45

## 2020-01-28 LAB
CA 125: 8.7 U/ML (ref 0–35)
GONADOTROPIN, CHORIONIC (HCG) QUANT: <5 MIU/ML

## 2020-01-30 LAB
AFP: 3.1 UG/L
MISCELLANEOUS LAB TEST ORDER: NORMAL

## 2020-02-06 LAB — MISCELLANEOUS LAB TEST RESULT: NORMAL

## 2020-06-09 LAB
CA 125: 7.9 U/ML (ref 0–35)
ESTRADIOL LEVEL: 38 PG/ML

## 2020-06-11 LAB — MISCELLANEOUS LAB TEST ORDER: NORMAL

## 2020-06-17 ENCOUNTER — HOSPITAL ENCOUNTER (OUTPATIENT)
Dept: CT IMAGING | Age: 30
Discharge: HOME | End: 2020-06-17
Payer: COMMERCIAL

## 2020-06-17 LAB — MISCELLANEOUS LAB TEST RESULT: NORMAL

## 2020-06-17 PROCEDURE — 74177 CT ABD & PELVIS W/CONTRAST: CPT

## 2020-06-17 PROCEDURE — 6360000004 HC RX CONTRAST MEDICATION: Performed by: OBSTETRICS & GYNECOLOGY

## 2020-06-17 RX ADMIN — IOPAMIDOL 75 ML: 755 INJECTION, SOLUTION INTRAVENOUS at 17:11

## 2020-06-17 RX ADMIN — IOHEXOL 50 ML: 240 INJECTION, SOLUTION INTRATHECAL; INTRAVASCULAR; INTRAVENOUS; ORAL at 17:11

## 2020-06-29 ENCOUNTER — OFFICE VISIT (OUTPATIENT)
Dept: OBGYN CLINIC | Age: 30
End: 2020-06-29
Payer: COMMERCIAL

## 2020-06-29 VITALS
BODY MASS INDEX: 23.85 KG/M2 | DIASTOLIC BLOOD PRESSURE: 68 MMHG | WEIGHT: 129.6 LBS | HEIGHT: 62 IN | SYSTOLIC BLOOD PRESSURE: 102 MMHG | TEMPERATURE: 97.2 F | HEART RATE: 79 BPM

## 2020-06-29 PROCEDURE — 99203 OFFICE O/P NEW LOW 30 MIN: CPT | Performed by: OBSTETRICS & GYNECOLOGY

## 2020-06-29 ASSESSMENT — PATIENT HEALTH QUESTIONNAIRE - PHQ9
DEPRESSION UNABLE TO ASSESS: FUNCTIONAL CAPACITY MOTIVATION LIMITS ACCURACY
SUM OF ALL RESPONSES TO PHQ QUESTIONS 1-9: 0
1. LITTLE INTEREST OR PLEASURE IN DOING THINGS: 0
SUM OF ALL RESPONSES TO PHQ9 QUESTIONS 1 & 2: 0
SUM OF ALL RESPONSES TO PHQ QUESTIONS 1-9: 0
2. FEELING DOWN, DEPRESSED OR HOPELESS: 0

## 2020-06-29 ASSESSMENT — ENCOUNTER SYMPTOMS
WHEEZING: 0
CONSTIPATION: 0
ABDOMINAL PAIN: 0
BACK PAIN: 0
SHORTNESS OF BREATH: 0
BLOOD IN STOOL: 0
COLOR CHANGE: 0

## 2020-06-29 NOTE — PROGRESS NOTES
[facility]  Gynecology History and Physical    CC:   Chief Complaint   Patient presents with    New Patient     previously had ovarian cancer. pt. wanting to concieve       HPI: Collette Naval is a 34 y.o. female who presents to establish care. 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 She has a medical history significant for ovarian cancer. 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 She was subsequently seen by Dr. Nicol Florence at [facility] for care. Pt states she underwent chemotherapy. Retains her uterus / cervix and left ovary / tube. We will request records from both [facility] and [facility]'s Office. States mass was found when she was trying to conceive. Very interested in future pregnancy. States her last apt with Dr. Nicol Florence was reassuring (improved labs and normal CT) and she may start trying again. Had a pelvic exam / rectal exam with him on 6/9/20 - denies any changes since. States she is familiar with tracking her periods. Is taking PNV. LMP was 6/9/20. States cycles are now 14 d / 4-5 d, with normal to heavy bleeding. Prior to chemo, she states her cycles were 30d / 7d and very heavy with pain. Denies spotting between periods. Has had cycle for past 6 months. States she was initially having hot flashes / night sweats with chemo, but these have since resolved. Has purchased ovulation predictor kits and was educated on use. Pt states she believes her last PAP was 2/2019 and it was normal (done with Dr. Rena Rivera - records pending). Denies a hx of abnormal PAPs or STIs. States she had a Mirena in place and it was removed around the same time. Review of Systems:   Review of Systems   Constitutional: Negative for chills, fatigue, fever and unexpected weight change. HENT: Negative for nosebleeds. Respiratory: Negative for shortness of breath and wheezing.     Cardiovascular: Negative for her  Pfannenstiel incision. 4.  Pelvic washing. OPERATION PERFORMED 7/30/19:    1. Placement of a left subclavian 8 [place] [employer] (CT/MRI accessible)  2. Fluoroscopy guidance    Family History:  Family History   Problem Relation Age of Onset    Other Mother     Depression Mother     Depression Father     Ovarian Cancer Paternal Great Grandmother        Social History:  Social History     Substance and Sexual Activity   Alcohol Use Yes    Comment: occ     Social History     Substance and Sexual Activity   Drug Use Never     Social History     Tobacco Use   Smoking Status Never Smoker   Smokeless Tobacco Never Used       Physical Exam:  /68 (Site: Left Upper Arm, Position: Sitting, Cuff Size: Medium Adult)   Pulse 79   Temp 97.2 °F (36.2 °C) (Oral)   Ht 5' 2\" (1.575 m)   Wt 129 lb 9.6 oz (58.8 kg)   BMI 23.70 kg/m²   General: Alert, well appearing, no acute distress  Head: Normocephalic, atraumatic  Thyroid: No thyromegaly or masses present   Breasts: DEFERRED  Lungs: Respiratory effort within normal limits   CV: Regular rate  Abdomen: Soft, nontender, nondistended   Pelvic: DEFERRED  Rectovaginal: DEFERRED  Extremities: No redness or tenderness, neg Lena's sign  Skin: Well perfused, normal coloration and turgor, no lesions or rashes visualized  Neuro: Alert, oriented, normal speech, no focal deficits, moves extremities appropriately  Osteopathic: No TART changes    Labs:  No visits with results within 1 Day(s) from this visit. Latest known visit with results is:   Orders Only on 06/09/2020   Component Date Value    Miscellaneous Lab Test R* 06/09/2020 SEE NOTE      TUMOR MARKERS (SEE Epic)    Pathology:  7/2/2019 SURGICAL PATHOLOGY   FINAL DIAGNOSIS:       A. 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   -  Unremarkable fallopian tube.   -  See synoptic report.       B.  Cecal

## 2020-08-03 ENCOUNTER — TELEPHONE (OUTPATIENT)
Dept: OBGYN CLINIC | Age: 30
End: 2020-08-03

## 2020-08-03 NOTE — TELEPHONE ENCOUNTER
Patient is calling because she recently took a pregnancy test at home and it came back positive. Her LMP was 7/3/20, so she is scheduled to come back around 8 wks. She would like to make sure its okay to wait that long due to her hx of ovarian cancer. I informed patient I would double check with the physician however for viability reasons we usually wait until a patient is atleast 8 wks. Patient is scheduled for 9/1/20 w/ please confirm if this is okay or will she need to be seen sooner due to her hx.

## 2020-08-03 NOTE — TELEPHONE ENCOUNTER
Yes - please let pt know that this early on it is fine to wait for a viability US, unless she starts to have symptoms such as vaginal bleeding and abdominal pain, fevers / chills, etc.. Please let her know I understand the anxiety of waiting but it usually is easier in the long run than doing a scan too early, not seeing the anatomy we need to see and having the concern of early IUP vs miscarriage vs ectopic -- which leads to excessive testing / anxiety as well.      Jerman

## 2020-08-31 ENCOUNTER — TELEPHONE (OUTPATIENT)
Dept: OBGYN CLINIC | Age: 30
End: 2020-08-31

## 2020-09-01 ENCOUNTER — OFFICE VISIT (OUTPATIENT)
Dept: OBGYN CLINIC | Age: 30
End: 2020-09-01
Payer: COMMERCIAL

## 2020-09-01 VITALS
SYSTOLIC BLOOD PRESSURE: 112 MMHG | TEMPERATURE: 98.6 F | HEART RATE: 87 BPM | BODY MASS INDEX: 23.55 KG/M2 | WEIGHT: 128 LBS | DIASTOLIC BLOOD PRESSURE: 64 MMHG | HEIGHT: 62 IN

## 2020-09-01 LAB
BILIRUBIN URINE: NEGATIVE
BLOOD, URINE: NEGATIVE
CLARITY: CLEAR
COLOR: ABNORMAL
CONTROL: ABNORMAL
CRL: NORMAL
EPITHELIAL CELLS, UA: 3 /HPF (ref 0–5)
GLUCOSE URINE: NEGATIVE MG/DL
HYALINE CASTS: 6 /LPF (ref 0–8)
KETONES, URINE: NEGATIVE MG/DL
LEUKOCYTE ESTERASE, URINE: NEGATIVE
MICROSCOPIC EXAMINATION: YES
NITRITE, URINE: NEGATIVE
PH UA: 6 (ref 5–8)
PREGNANCY TEST URINE, POC: POSITIVE
PROTEIN UA: ABNORMAL MG/DL
RBC UA: 4 /HPF (ref 0–4)
SAC DIAMETER: NORMAL
SPECIFIC GRAVITY UA: >1.03 (ref 1–1.03)
URINE TYPE: ABNORMAL
UROBILINOGEN, URINE: 0.2 E.U./DL
WBC UA: 10 /HPF (ref 0–5)

## 2020-09-01 PROCEDURE — 76801 OB US < 14 WKS SINGLE FETUS: CPT | Performed by: OBSTETRICS & GYNECOLOGY

## 2020-09-01 PROCEDURE — 81025 URINE PREGNANCY TEST: CPT | Performed by: OBSTETRICS & GYNECOLOGY

## 2020-09-01 PROCEDURE — 81003 URINALYSIS AUTO W/O SCOPE: CPT | Performed by: OBSTETRICS & GYNECOLOGY

## 2020-09-01 PROCEDURE — 99213 OFFICE O/P EST LOW 20 MIN: CPT | Performed by: OBSTETRICS & GYNECOLOGY

## 2020-09-01 RX ORDER — PYRIDOXINE HCL (VITAMIN B6) 25 MG
25 TABLET ORAL 2 TIMES DAILY PRN
Qty: 30 TABLET | Refills: 1 | Status: ON HOLD | OUTPATIENT
Start: 2020-09-01 | End: 2021-04-11 | Stop reason: ALTCHOICE

## 2020-09-01 RX ORDER — ONDANSETRON 4 MG/1
4 TABLET, ORALLY DISINTEGRATING ORAL EVERY 8 HOURS PRN
Qty: 20 TABLET | Refills: 0 | Status: SHIPPED | OUTPATIENT
Start: 2020-09-01 | End: 2020-09-28 | Stop reason: SDUPTHER

## 2020-09-01 NOTE — PROGRESS NOTES
Return Gyn Office Visit    CC:   Chief Complaint   Patient presents with    Amenorrhea     LMP 7/3/2020       HPI:  34 y.o. who presents to office for amenorrhea. LMP 7/3/2020. Since then states she has been having a lot of nausea since finding out she was pregnant. Hasn't really been able to keep down much, has been throwing up up to 6 times/day but doesn't throw up all the time. Some breast tenderness. No bleeding, no cramping. 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 Has been doing well since that time. OBHx:  OB History   No obstetric history on file. Gyn:  Denies STI  Patient unsure of date of last pap smear    PMH:         Past Medical History:   Diagnosis Date    Anemia     Depression     Infertility, female     Menopausal symptoms     Right ovarian epithelial cancer ([address] 7/2/2019     PSH:   Past Surgical History:   Procedure Laterality Date    DENTAL SURGERY      impacted tooth    INSERTION / REMOVAL / REPLACEMENT VENOUS ACCESS CATHETER Left 7/30/2019    LEFT SUBCLAVIAN INFUSAPORT PLACEMENT performed by Ramy Reyes DO at [facility] SALPINGO-OOPHORECTOMY Right 07/02/2019    EXPLOROTORY LAPAROTOMY, RIGHT SALPINGO OOPHORECTOMY,   PELVIC WASHINGS, BIOPSIES OF CECUM SEROSA. SMALL BOWEL SEROSA, OMENTUM performed by Juan Simons MD at [address] [place] [address] Right 7/2/2019    INCORRECT PROCEDURE     Meds:  - PNV    Allergies:   Allergies   Allergen Reactions    Latex Other (See Comments)     Redness and burning when exposed to latex    Pcn [Penicillins] Shortness Of Breath and Rash       Objective:  /64   Pulse 87   Temp 98.6 °F (37 °C)   Ht 5' 2\" (1.575 m)   Wt 128 lb (58.1 kg)   LMP 07/03/2020   BMI 23.41 kg/m²   General: Alert, well appearing, no acute distress  CV: well perfused  Resp: nonlabored  Abdomen: Soft, nontender, nondistended   Pelvic: External genitalia without masses or lesions. Moist, pink vagina with physiologic discharge. Cervix without polyps or masses. Uterus mobile and midline without masses. Adnexa palpated without masses or tenderness. Labs:   UPT+    Imaging:   Impression    OBSTETRIC ULTRASOUND--1ST TRIMESTER         DATE: 9/1/20         PHYSICIAN: Mendoza Roman M.D.         SONOGRAPHER: Juliana Castanon RDMS         INDICATION: Amenorrhea         TYPE OF SCAN:  vaginal         FINDINGS:           The cul de sac is normal.  The cervix is normal.  The uterus is gravid.         The uterus measures 9.45 cm x 8.25 cm x 5.92 cm. No uterine anomalies are evident.         The right ovary is surgically absent.         The left ovary is present. The left ovary measures 2.48 cm x 3.14 cm x 2.94 cm.         There is a single intrauterine pregnancy identified.  A fetal pole is noted with a CRL measuring 2.08 cm, consistent with gestational age of 8weeks and 5days and EDC of 4/8/21. Ricka Backers is a 1 day discrepancy when compared with the gestational age of 8weeks and 4days and EDC of 4/9/21 set by [facility] (7/3/20). Yolk sac is present and measures 0.53 cm.  Fetal cardiac activity is present at 179 bpm.              IMPRESSION:      Single IUP with cardiac activity. Final JADE 4/9/2021 by LMP.      Imaging is limited secondary to bowel gas. Patient is well aware of the limitations of ultrasound in the detection of anomalies.               Assessment/Plan  1. Pap smear for cervical cancer screening  - PAP SMEAR    2. Possible exposure to STD  - VAGINAL PATHOGENS PROBE *A  - C.trachomatis N.gonorrhoeae DNA    3.  Amenorrhea  Patient with amenorrhea, US today shows single IUP with cardiac activity at 8+4 with final JADE 4/9/21 by L=8wk US.  - Discussed routine prenatal care, early pregnancy precautions, continued use of PNV  - Below labs sent  - Briefly reviewed options for genetic screening including cffDNA, 1st trimester screen with NT/NOLAN-A, and MQS in 2nd trimester. Patient and  to discuss and will readdress at next viist  - Patient with significant nausea and vomiting, rx for B6/doxylamine and PRN zofran sent to pharmacy today. Return precautions reviewed. - Culture, Urine  - Urinalysis  - VAGINAL PATHOGENS PROBE *A  - C.trachomatis N.gonorrhoeae DNA    4. Right ovarian epithelial cancer [facility])  Patient with history of pure dysgerminoma s/p RSO and 3 cycles BEP (last in October). See onc note in Epic for further details.     Dispo: 4 weeks for IPV  Jim Pitts MD

## 2020-09-02 LAB
C TRACH DNA GENITAL QL NAA+PROBE: NEGATIVE
CANDIDA SPECIES, DNA PROBE: NORMAL
GARDNERELLA VAGINALIS, DNA PROBE: NORMAL
N. GONORRHOEAE DNA: NEGATIVE
TRICHOMONAS VAGINALIS DNA: NORMAL
URINE CULTURE, ROUTINE: NORMAL

## 2020-09-28 ENCOUNTER — INITIAL PRENATAL (OUTPATIENT)
Dept: OBGYN CLINIC | Age: 30
End: 2020-09-28
Payer: COMMERCIAL

## 2020-09-28 VITALS
HEART RATE: 65 BPM | SYSTOLIC BLOOD PRESSURE: 98 MMHG | DIASTOLIC BLOOD PRESSURE: 60 MMHG | BODY MASS INDEX: 23.92 KG/M2 | WEIGHT: 130.8 LBS

## 2020-09-28 PROBLEM — O21.9 NAUSEA AND VOMITING DURING PREGNANCY: Status: ACTIVE | Noted: 2020-09-28

## 2020-09-28 PROBLEM — Z34.91 PRENATAL CARE IN FIRST TRIMESTER: Status: ACTIVE | Noted: 2020-09-28

## 2020-09-28 LAB
BASOPHILS ABSOLUTE: 0 K/UL (ref 0–0.2)
BASOPHILS RELATIVE PERCENT: 0.3 %
EOSINOPHILS ABSOLUTE: 0 K/UL (ref 0–0.6)
EOSINOPHILS RELATIVE PERCENT: 0.4 %
HCT VFR BLD CALC: 38.1 % (ref 36–48)
HEMOGLOBIN: 13.1 G/DL (ref 12–16)
LYMPHOCYTES ABSOLUTE: 1 K/UL (ref 1–5.1)
LYMPHOCYTES RELATIVE PERCENT: 14.2 %
MCH RBC QN AUTO: 31.2 PG (ref 26–34)
MCHC RBC AUTO-ENTMCNC: 34.4 G/DL (ref 31–36)
MCV RBC AUTO: 90.7 FL (ref 80–100)
MONOCYTES ABSOLUTE: 0.4 K/UL (ref 0–1.3)
MONOCYTES RELATIVE PERCENT: 6.4 %
NEUTROPHILS ABSOLUTE: 5.4 K/UL (ref 1.7–7.7)
NEUTROPHILS RELATIVE PERCENT: 78.7 %
PDW BLD-RTO: 13.9 % (ref 12.4–15.4)
PLATELET # BLD: 180 K/UL (ref 135–450)
PMV BLD AUTO: 8.3 FL (ref 5–10.5)
RBC # BLD: 4.2 M/UL (ref 4–5.2)
WBC # BLD: 6.9 K/UL (ref 4–11)

## 2020-09-28 PROCEDURE — 36415 COLL VENOUS BLD VENIPUNCTURE: CPT | Performed by: OBSTETRICS & GYNECOLOGY

## 2020-09-28 PROCEDURE — 0500F INITIAL PRENATAL CARE VISIT: CPT | Performed by: OBSTETRICS & GYNECOLOGY

## 2020-09-28 RX ORDER — PROMETHAZINE HYDROCHLORIDE 12.5 MG/1
12.5 TABLET ORAL 3 TIMES DAILY PRN
Qty: 12 TABLET | Refills: 0 | Status: ON HOLD | OUTPATIENT
Start: 2020-09-28 | End: 2021-04-11 | Stop reason: ALTCHOICE

## 2020-09-28 RX ORDER — ONDANSETRON 4 MG/1
4 TABLET, ORALLY DISINTEGRATING ORAL EVERY 8 HOURS PRN
Qty: 20 TABLET | Refills: 0 | Status: ON HOLD | OUTPATIENT
Start: 2020-09-28 | End: 2021-04-11 | Stop reason: ALTCHOICE

## 2020-09-28 NOTE — PROGRESS NOTES
Initial OB Office Visit    CC:   Chief Complaint   Patient presents with    Initial Prenatal Visit       HPI:  Pt seen and examined. No concerns/complaints. Denies VB, LOF. Some discharge, no cramping. No fetal movement yet. +constipation. Still nauseated, throws up some days but not every day like before. B6, zofran help some. Maternal wellness questionnaire reviewed - no concerns today. Score 5. Objective:  BP 98/60   Pulse 65   Wt 130 lb 12.8 oz (59.3 kg)   LMP 2020   BMI 23.92 kg/m²   Gen: AO, NAD  Abd: Soft, NT  FHT: 151 by BSUS    Assessment/Plan:  34 y.o.  at 12w3d (Estimated Date of Delivery: 21) presents for ROBBIN appointment:     Problem List Items Addressed This Visit        Gynecology/Obstetric Problems    Right ovarian epithelial cancer ([facility] [address]    Relevant Medications    promethazine (PHENERGAN) 12.5 MG tablet    Prenatal care in first trimester - Primary     - FWB: reassuring by US today  - Genetic screening: patient declines at this time  - Anatomy scan: plan at 20 weeks  - Flu shot: still needed  - Tdap: plan at 28wks  - PNL: drawn today, GCCT/trich neg, Ucx no growth         Relevant Orders    TYPE AND SCREEN    CBC WITH AUTO DIFFERENTIAL    RUBELLA ANTIBODY, IGG    Syphilis Antibody Cascading Reflex    HEPATITIS B SURFACE ANTIGEN    HIV-1 AND HIV-2 ANTIBODIES    Drug Screen Multi Urine With Bup    Nausea and vomiting during pregnancy     Improving some but still having days with persistent nausea. - TWG 2lbs  - Discussed symptomatic relief, continue B6/doxylamine and zofran.  Will send rx for phenergan PRN at bedtime               Dispo: RTC in 4 weeks  Olesya Zheng MD

## 2020-09-29 LAB
ABO/RH: NORMAL
AMPHETAMINE SCREEN, URINE: NORMAL
ANTIBODY SCREEN: NORMAL
BARBITURATE SCREEN URINE: NORMAL
BENZODIAZEPINE SCREEN, URINE: NORMAL
BUPRENORPHINE URINE: NORMAL
CANNABINOID SCREEN URINE: NORMAL
COCAINE METABOLITE SCREEN URINE: NORMAL
HEPATITIS B SURFACE ANTIGEN INTERPRETATION: NORMAL
HIV AG/AB: NORMAL
HIV ANTIGEN: NORMAL
HIV-1 ANTIBODY: NORMAL
HIV-2 AB: NORMAL
Lab: NORMAL
METHADONE SCREEN, URINE: NORMAL
OPIATE SCREEN URINE: NORMAL
OXYCODONE URINE: NORMAL
PH UA: 7
PHENCYCLIDINE SCREEN URINE: NORMAL
PROPOXYPHENE SCREEN: NORMAL
RUBELLA ANTIBODY IGG: 116.8 IU/ML
TOTAL SYPHILLIS IGG/IGM: NORMAL

## 2020-10-05 ENCOUNTER — TELEPHONE (OUTPATIENT)
Dept: OBGYN CLINIC | Age: 30
End: 2020-10-05

## 2020-10-05 NOTE — LETTER
[facility]  [place] [address] [facility] [address]  Phone: [phone]  Fax: [phone]    Lesa Natarajan MD        October 6, 2020    [address]  [place] [address]      To whom it may concern:    Tramaine Rivera is currently pregnant and per ACOG evidence does suggest pregnant women are at increased risk of mechanical ventilation and ICU admission if they contract [address] compared to nonpregnant peers. She should avoid contact with COVID19 patient's if possible. If you have any questions or concerns, please don't hesitate to call.     Sincerely,        Lesa Natarajan MD

## 2020-10-05 NOTE — TELEPHONE ENCOUNTER
Patient is currently pregnant has history of ovarian cx. Oct 1, 2019 is her last chemo tx. 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 She had a meeting this afternoon with her manager and 3 other department heads and was informed that she will be required to go into rooms where patient's have COVID or suspected COVID and she will be fine so long as she is wearing the proper PPE. Patient is requesting a letter from Dr. Tyler Kumar given her long mirza with ovarian cancer and currently being pregnant that she should not be in contact with COVID patients. This change happens at 5am tomorrow morning.     Routing to Dr. Tyler Kumar

## 2020-10-06 NOTE — TELEPHONE ENCOUNTER
OK to provide note that per ACOG evidence does suggest pregnant women are at increased risk of mechanical ventilation and ICU admission if they contract [address] compared to nonpregnant peers and if possible avoiding contact would be best. Thanks.

## 2020-10-26 ENCOUNTER — TELEPHONE (OUTPATIENT)
Dept: OBGYN CLINIC | Age: 30
End: 2020-10-26

## 2020-10-27 ENCOUNTER — ROUTINE PRENATAL (OUTPATIENT)
Dept: OBGYN CLINIC | Age: 30
End: 2020-10-27

## 2020-10-27 VITALS
HEART RATE: 92 BPM | WEIGHT: 135 LBS | BODY MASS INDEX: 24.69 KG/M2 | SYSTOLIC BLOOD PRESSURE: 108 MMHG | DIASTOLIC BLOOD PRESSURE: 70 MMHG

## 2020-10-27 PROBLEM — Z67.91 RH NEGATIVE STATE IN ANTEPARTUM PERIOD: Status: ACTIVE | Noted: 2020-10-27

## 2020-10-27 PROBLEM — Z34.92 PRENATAL CARE IN SECOND TRIMESTER: Status: ACTIVE | Noted: 2020-09-28

## 2020-10-27 PROBLEM — O26.899 RH NEGATIVE STATE IN ANTEPARTUM PERIOD: Status: ACTIVE | Noted: 2020-10-27

## 2020-10-27 PROCEDURE — 0502F SUBSEQUENT PRENATAL CARE: CPT | Performed by: OBSTETRICS & GYNECOLOGY

## 2020-10-27 NOTE — PROGRESS NOTES
Temp 97.0 Oral F     Maternal emotional well being screening form completed and reviewed with patient. Current score is 9.

## 2020-10-27 NOTE — ASSESSMENT & PLAN NOTE
- FWB: reassuring by sarahi today  - Genetic screening: patient declines at this time  - Anatomy scan: plan at 20 weeks  - Flu shot: still needed, give at next visit  - Tdap: plan at 28wks  - PNL: O-/ab-, RI, HIV neg, syphilis neg, HepB neg, UDS neg.  Hemoglobin 13.1, GCCT/trich neg, Ucx no growth

## 2020-10-27 NOTE — PROGRESS NOTES
Return OB Office Visit    CC:   Chief Complaint   Patient presents with    Routine Prenatal Visit       HPI:  Pt seen and examined. No concerns/complaints. Denies VB, LOF, cramps. No regular fetal movement yet. Doing really well, no zofran in 2 weeks. Nausea and vomiting for a few weeks. Maternal wellness questionnaire reviewed - no concerns today. Score 9. Objective:  /70   Pulse 92   Wt 135 lb (61.2 kg)   LMP 2020   BMI 24.69 kg/m²   Gen: AO, NAD  Abd: Soft, NT  FHT: 138    Assessment/Plan:  34 y.o.  at 16w4d (Estimated Date of Delivery: 21) presents for ROBBIN appointment:     Problem List Items Addressed This Visit        Gynecology/Obstetric Problems    Right ovarian epithelial cancer ([facility] [address]    Prenatal care in second trimester - Primary     - FWB: reassuring by sarahi today  - Genetic screening: patient declines at this time  - Anatomy scan: plan at 20 weeks  - Flu shot: still needed, give at next visit  - Tdap: plan at 28wks  - PNL: O-/ab-, RI, HIV neg, syphilis neg, HepB neg, UDS neg.  Hemoglobin 13.1, GCCT/trich neg, Ucx no growth         Nausea and vomiting during pregnancy     Improved today         Rh negative state in antepartum period     Plan rhogam at 28wks and PP work-up               Dispo: RTC in 4 weeks, anatomy US and flu shot then  Chel Light MD

## 2020-10-28 ENCOUNTER — TELEPHONE (OUTPATIENT)
Dept: OBGYN CLINIC | Age: 30
End: 2020-10-28

## 2020-10-28 NOTE — TELEPHONE ENCOUNTER
I had discussed with her in the office that at this time we are not specifically providing physician notes for COVID restrictions in pregnancy as this is something she would have to discuss with her supervisors and HR directly. We are unable to contact HR on her behalf and can only fill out FMLA and/or other paperwork she provides us. Thanks.

## 2020-10-28 NOTE — TELEPHONE ENCOUNTER
Pt calling for work accommodations. She states she wants to have an excuse to not go into Covid rooms due to pregnancy. 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 She says she has already spoken to  and was told to call our office. She says she does not have any paperwork and was told we have to call HR. Please advise.

## 2020-11-24 ENCOUNTER — TELEPHONE (OUTPATIENT)
Dept: OBGYN CLINIC | Age: 30
End: 2020-11-24

## 2020-11-25 ENCOUNTER — ROUTINE PRENATAL (OUTPATIENT)
Dept: OBGYN CLINIC | Age: 30
End: 2020-11-25

## 2020-11-25 ENCOUNTER — OFFICE VISIT (OUTPATIENT)
Dept: OBGYN CLINIC | Age: 30
End: 2020-11-25
Payer: COMMERCIAL

## 2020-11-25 VITALS
SYSTOLIC BLOOD PRESSURE: 121 MMHG | HEART RATE: 117 BPM | WEIGHT: 141 LBS | DIASTOLIC BLOOD PRESSURE: 72 MMHG | BODY MASS INDEX: 25.79 KG/M2

## 2020-11-25 LAB
ABDOMINAL CIRCUMFERENCE: NORMAL
BIPARIETAL DIAMETER: NORMAL
ESTIMATED FETAL WEIGHT: NORMAL
FEMORAL DIAMETER: NORMAL
HC/AC: NORMAL
HEAD CIRCUMFERENCE: NORMAL

## 2020-11-25 PROCEDURE — 76805 OB US >/= 14 WKS SNGL FETUS: CPT | Performed by: OBSTETRICS & GYNECOLOGY

## 2020-11-25 PROCEDURE — 0502F SUBSEQUENT PRENATAL CARE: CPT | Performed by: OBSTETRICS & GYNECOLOGY

## 2020-11-25 NOTE — PROGRESS NOTES
31 y.o.  at [address] [facility] Estimated Date of Delivery: 21 here for ROBBIN:     Pt seen and examined. No concerns/complaints. Denies VB, LOF, CTX +FM. MWQ reviewed. Does admit to one incident of feeling faint at work, came home nauseated. States she checked her BP at the time and it was 96/60. Denies any further symptoms. Objective:  /72   Pulse 117   Wt 141 lb (64 kg)   LMP 2020   BMI 25.79 kg/m²   Gen: AO, NAD  Abd: Soft, NT, gravid   Ext: Mild LE edema  OMM: Increased lumbar lordosis    Images  OBSTETRIC ULTRASOUND -- SECOND TRIMESTER    DATE:  20    PHYSICIAN: JACKELYN Magaña.    SONOGRAPHER: SHA Hernandez RDMS    INDICATION:  Second trimester, Anatomical screening    TYPE OF SCAN: vaginal, abdominal 3.5 MHz 5MHz    FINDINGS:      A single viable intrauterine pregnancy is noted in cephalic presentation. Cardiac and somatic activity are noted. The following values were obtained:   Fetal heart rate    136bpm   BPD      5.19cm  85.6 %   Head Circumference     19.00cm 66.7 %    Abdominal Circumference   15.76cm 49.8 %   Femur Length     3.57cm  62.3 %   Humerus Length    3.35cm  71.2 %   Cerebellum     2.28cm  82.0 %   Amniotic fluid DVP    4.55cm   EFW      401g  66.5 percentile    Subjective amniotic fluid volume is normal. Based on sonographic criteria, the estimated fetal age is 21weeks and 2days with EDC of 21. There is a 4 day discordance with the established EDC of 21. The patient has an anterior placenta that is adequate distance in relation to the internal cervical os. The evaluation of the lower uterine segment and cervix reveals normal appearing anatomy. Transvaginal cervical length is 4.18cm with no funneling noted. The uterus is unremarkable/gravid. Maternal ovaries and adnexae are not well visualized due to the size of the uterus and patient's gravid state.     Normal Anatomy Seen:  4 chamber heart   Spine    CSP  LVOT     Kidneys   Lateral

## 2020-12-21 ENCOUNTER — TELEPHONE (OUTPATIENT)
Dept: OBGYN CLINIC | Age: 30
End: 2020-12-21

## 2020-12-22 ENCOUNTER — ROUTINE PRENATAL (OUTPATIENT)
Dept: OBGYN CLINIC | Age: 30
End: 2020-12-22

## 2020-12-22 ENCOUNTER — OFFICE VISIT (OUTPATIENT)
Dept: OBGYN CLINIC | Age: 30
End: 2020-12-22
Payer: COMMERCIAL

## 2020-12-22 VITALS
WEIGHT: 150.8 LBS | HEART RATE: 100 BPM | SYSTOLIC BLOOD PRESSURE: 114 MMHG | DIASTOLIC BLOOD PRESSURE: 69 MMHG | BODY MASS INDEX: 27.58 KG/M2

## 2020-12-22 PROCEDURE — 0502F SUBSEQUENT PRENATAL CARE: CPT | Performed by: OBSTETRICS & GYNECOLOGY

## 2020-12-22 PROCEDURE — 76816 OB US FOLLOW-UP PER FETUS: CPT | Performed by: OBSTETRICS & GYNECOLOGY

## 2020-12-22 NOTE — PROGRESS NOTES
31 y.o.  at 18w2d EGA Estimated Date of Delivery: 21 here for ROBBIN:     Pt seen and examined. 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 States she is doing well when she is not working. Denies VB, LOF, CTX +FM. MWQ reviewed (score 13 -- states she has been stressed due to symptoms at work). Objective:  /69   Pulse 100   Wt 150 lb 12.8 oz (68.4 kg)   LMP 2020   BMI 27.58 kg/m²   Gen: AO, NAD  Abd: Soft, NT, gravid    FH: 24 cm    FHT: 139  Ext: Mild LE edema  OMM: Increased lumbar lordosis      Images:  OBSTETRICAL ULTRASOUND     DATE: 2020    PHYSICIAN: JACKELYN Chance.     SONOGRAPHER: SHA Hernandez RDMS    INDICATION: viability    TYPE OF SCAN: abdominal    FINDINGS:  A single viable intrauterine pregnancy is noted in transverse breech presentation. Cardiac and somatic activity are noted. The following values were obtained:   Fetal heart rate    139bpm    Amniotic fluid volume is normal.    The patient has an anterior placenta. IMPRESSION:  Single live IUP in the second trimester. Normal heart rate and rhythm. Assessment/Plan:   Diagnosis Orders   1. Prenatal care in second trimester     2. Rh negative state in antepartum period     3. Nausea and vomiting during pregnancy     4. Right ovarian epithelial cancer ([facility] [address]     5. Pre-syncope        1. Prenatal Care in Second Trimester  - Reassuring maternal / fetal status   - Aneuploidy Screen: declines    - AFP: declines  - Anatomy: 20 nml anatomy, Male, Ant Placenta, nml CL.   - Flu:  - PNL: PNL: O-/ab-, RI, HIV neg, syphilis neg, HepB neg, UDS neg. Hemoglobin 13.1, GCCT/trich neg, Ucx no growth, NILM 2020    - 28 wk:    - Tdap:    - GBS:   - COVID:       2. Rh Negative in antepartum period  - Rho Elizabeth at 28 wks and PP, unless otherwise needed     3. Nausea / Emesis in pregnancy   - Improved     4.  Hx of Ovarian Cancer  - Right Ovarian Dysgerminoma - minimal stage IC1  - s/p RSO on 7/4/2019  - s/p chemotherapy (3 cycles of BEP) 10/2019 -- follows with [facility]     5. Pre-Syncopal Episodes   - BP / HR normal on exam   - Recommend light duty at work     Follow Up  Return in about 4 weeks (around 1/19/2021) for Return OB visit, Labs.     Vinay Gonzalez, DO

## 2020-12-26 PROBLEM — R55 PRE-SYNCOPE: Status: ACTIVE | Noted: 2020-12-26

## 2021-01-20 ENCOUNTER — ROUTINE PRENATAL (OUTPATIENT)
Dept: OBGYN CLINIC | Age: 31
End: 2021-01-20
Payer: COMMERCIAL

## 2021-01-20 VITALS
SYSTOLIC BLOOD PRESSURE: 102 MMHG | WEIGHT: 158.25 LBS | BODY MASS INDEX: 28.94 KG/M2 | HEART RATE: 112 BPM | DIASTOLIC BLOOD PRESSURE: 70 MMHG

## 2021-01-20 DIAGNOSIS — C56.1 RIGHT OVARIAN EPITHELIAL CANCER (HCC): ICD-10-CM

## 2021-01-20 DIAGNOSIS — O21.9 NAUSEA AND VOMITING DURING PREGNANCY: ICD-10-CM

## 2021-01-20 DIAGNOSIS — O26.899 RH NEGATIVE STATE IN ANTEPARTUM PERIOD: ICD-10-CM

## 2021-01-20 DIAGNOSIS — O26.893 VAGINAL DISCHARGE IN PREGNANCY IN THIRD TRIMESTER: ICD-10-CM

## 2021-01-20 DIAGNOSIS — O99.013 ANEMIA DURING PREGNANCY IN THIRD TRIMESTER: ICD-10-CM

## 2021-01-20 DIAGNOSIS — Z67.91 RH NEGATIVE STATE IN ANTEPARTUM PERIOD: ICD-10-CM

## 2021-01-20 DIAGNOSIS — Z34.93 PRENATAL CARE IN THIRD TRIMESTER: Primary | ICD-10-CM

## 2021-01-20 DIAGNOSIS — R55 PRE-SYNCOPE: ICD-10-CM

## 2021-01-20 DIAGNOSIS — N89.8 VAGINAL DISCHARGE IN PREGNANCY IN THIRD TRIMESTER: ICD-10-CM

## 2021-01-20 PROCEDURE — 36415 COLL VENOUS BLD VENIPUNCTURE: CPT | Performed by: OBSTETRICS & GYNECOLOGY

## 2021-01-20 PROCEDURE — 0502F SUBSEQUENT PRENATAL CARE: CPT | Performed by: OBSTETRICS & GYNECOLOGY

## 2021-01-20 NOTE — PROGRESS NOTES
Temp 97.2 F infrared   Maternal emotional well being screening form completed and reviewed with patient. Current score is 9. Patient given referral to [facility] ([phone]): No      4:01 PM Given Tdap (Adacel) vaccine 0.5mL IM  Site:Right arm. Lot #[id number]  Expiration Date: 11/07/2022  [address] #47181-539-40. Patient tolerated well. No reaction noted after 20 minutes. VIS sheet provided. Administered by: Dav Philippe     4:02 PM Given RhoGAM 300mcg IM  Blood Type:O neg  Current gestation: 28 weeks 2 days  Site:Right upper quad. gluteus. Lot #[id number]  Expiration Date: 5/30/2022  NDC #4092-3261-52. Patient tolerated well. No reaction noted after 20 minutes. ID card provided to patient.   Administered by: Dav Philippe

## 2021-01-20 NOTE — PROGRESS NOTES
31 y.o.  at 30w6d Estimated Date of Delivery: 21 here for ROBBIN:     Pt seen and examined. 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 Denies VB, CTX, endorses FM. Does admit to concern for LOF -- states she felt like she was \"leaking all day\" yesterday, states fluid was clear and odorless, concerned it was not urine. Noticed less fluid today. Has admitted to wearing a pad but it has not been saturated. MWQ reviewed (score 9). Pt is getting her 28 wk labs and GCT today. Objective:  /70   Pulse 112   Wt 158 lb 4 oz (71.8 kg)   LMP 2020   BMI 28.94 kg/m²   Gen: AO, NAD  Abd: Soft, NT, gravid    FH: 28 cm    FHT: 145   SSE: (-) Pooling / Fer[place] / Nitrazine, swabs collected   VE: Cl/Thick/High   Ext: Mild LE edema  OMM: Increased lumbar lordosis      Assessment/Plan:   Diagnosis Orders   1. Prenatal care in third trimester  CBC WITH AUTO DIFFERENTIAL    GLUCOSE CHALLENGE GESTATIONAL    Tetanus-Diphth-Acell Pertussis (BOOSTRIX) injection 0.5 mL   2. Rh negative state in antepartum period  rho,D, immune globulin (HYPERRHO S/D) 1500 units SOSY injection   3. Vaginal discharge in pregnancy in third trimester  VAGINAL PATHOGENS PROBE *A   4. Anemia during pregnancy in third trimester  ferrous sulfate (IRON 325) 325 (65 Fe) MG tablet    docusate sodium (COLACE) 100 MG capsule   5. Pre-syncope (IMPROVED)     6. Right ovarian epithelial cancer ([address]     7. Nausea and vomiting during pregnancy (IMPROVED)        1. Prenatal Care in Second Trimester  - Reassuring maternal / fetal status   - Aneuploidy Screen: declines    - AFP: declines  - Anatomy: 20 nml Male, Ant Placenta, nml CL.   - Flu:   - PNL: PNL: O-/ab-, RI, HIV neg, syphilis neg, HepB neg, UDS neg. Hemoglobin 13.1, GCCT/trich neg, Ucx no growth, NILM 2020    - 28 wk:  / Hgb 10.0 / Plt 248   - Tdap: 21   - Rho Elizabeth: 1/20/21    - GBS:   - COVID:       2.  Rh Negative in antepartum period  - s/p Rho Elizabeth 1/20/21  - needs PP, unless otherwise needed     3. Nausea / Emesis in pregnancy   - Improved     4. Hx of Ovarian Cancer  - Right Ovarian Dysgerminoma - minimal stage IC1  - s/p RSO on 7/4/2019  - s/p chemotherapy (3 cycles of BEP) 10/2019 -- follows with [facility]     5. Pre-Syncopal Episodes   - BP / HR normal on exam   - On light duty at work -- doing better now     6. Anemia in third trimester  - Iron rich diet  - cont PNV   - Iron and prn colace (sent to pharmacy)    7. Rule out LOF  - SSE reassuring (-) Pool / Susan Benjamin / Nitrazine   - VE CL/TH/High   - Vaginitis swab pending   - return precautions reviewed     Follow Up  Will call with results   Return OB / Covid precautions reviewed   Return in about 2 weeks (around 2/3/2021) for Return OB visit.     Jermaine Jefferson, DO

## 2021-01-21 LAB
BASOPHILS ABSOLUTE: 0.1 K/UL (ref 0–0.2)
BASOPHILS RELATIVE PERCENT: 0.6 %
CANDIDA SPECIES, DNA PROBE: NORMAL
EOSINOPHILS ABSOLUTE: 0.1 K/UL (ref 0–0.6)
EOSINOPHILS RELATIVE PERCENT: 0.6 %
GARDNERELLA VAGINALIS, DNA PROBE: NORMAL
GLUCOSE CHALLENGE: 129 MG/DL
HCT VFR BLD CALC: 29.8 % (ref 36–48)
HEMOGLOBIN: 10 G/DL (ref 12–16)
LYMPHOCYTES ABSOLUTE: 1.1 K/UL (ref 1–5.1)
LYMPHOCYTES RELATIVE PERCENT: 9.5 %
MCH RBC QN AUTO: 29.7 PG (ref 26–34)
MCHC RBC AUTO-ENTMCNC: 33.7 G/DL (ref 31–36)
MCV RBC AUTO: 88 FL (ref 80–100)
MONOCYTES ABSOLUTE: 1 K/UL (ref 0–1.3)
MONOCYTES RELATIVE PERCENT: 8.1 %
NEUTROPHILS ABSOLUTE: 9.5 K/UL (ref 1.7–7.7)
NEUTROPHILS RELATIVE PERCENT: 81.2 %
PDW BLD-RTO: 13.8 % (ref 12.4–15.4)
PLATELET # BLD: 248 K/UL (ref 135–450)
PMV BLD AUTO: 7.5 FL (ref 5–10.5)
RBC # BLD: 3.39 M/UL (ref 4–5.2)
TRICHOMONAS VAGINALIS DNA: NORMAL
WBC # BLD: 11.7 K/UL (ref 4–11)

## 2021-01-21 RX ORDER — FERROUS SULFATE 325(65) MG
325 TABLET ORAL
Qty: 180 TABLET | Refills: 1 | Status: ON HOLD | OUTPATIENT
Start: 2021-01-21 | End: 2021-04-16 | Stop reason: SDUPTHER

## 2021-01-21 RX ORDER — DOCUSATE SODIUM 100 MG/1
100 CAPSULE, LIQUID FILLED ORAL 2 TIMES DAILY PRN
Qty: 30 CAPSULE | Refills: 0 | Status: ON HOLD | OUTPATIENT
Start: 2021-01-21 | End: 2021-04-16 | Stop reason: SDUPTHER

## 2021-02-02 ENCOUNTER — ROUTINE PRENATAL (OUTPATIENT)
Dept: OBGYN CLINIC | Age: 31
End: 2021-02-02

## 2021-02-02 VITALS
BODY MASS INDEX: 28.94 KG/M2 | SYSTOLIC BLOOD PRESSURE: 98 MMHG | WEIGHT: 158.25 LBS | HEART RATE: 116 BPM | DIASTOLIC BLOOD PRESSURE: 62 MMHG

## 2021-02-02 DIAGNOSIS — C56.1 RIGHT OVARIAN EPITHELIAL CANCER (HCC): ICD-10-CM

## 2021-02-02 DIAGNOSIS — Z67.91 RH NEGATIVE STATE IN ANTEPARTUM PERIOD: ICD-10-CM

## 2021-02-02 DIAGNOSIS — R55 PRE-SYNCOPE: ICD-10-CM

## 2021-02-02 DIAGNOSIS — O99.013 ANEMIA DURING PREGNANCY IN THIRD TRIMESTER: ICD-10-CM

## 2021-02-02 DIAGNOSIS — Z34.93 PRENATAL CARE IN THIRD TRIMESTER: Primary | ICD-10-CM

## 2021-02-02 DIAGNOSIS — O21.9 NAUSEA AND VOMITING DURING PREGNANCY: ICD-10-CM

## 2021-02-02 DIAGNOSIS — O26.899 RH NEGATIVE STATE IN ANTEPARTUM PERIOD: ICD-10-CM

## 2021-02-02 PROCEDURE — 0502F SUBSEQUENT PRENATAL CARE: CPT | Performed by: OBSTETRICS & GYNECOLOGY

## 2021-02-02 NOTE — PROGRESS NOTES
Temp 97.4 f infrared  Maternal emotional well being screening form completed and reviewed with patient. Current score is 7. Patient given referral to [address] [place] [address] ([phone]):  No

## 2021-02-03 NOTE — PROGRESS NOTES
31 y.o.  at 30w3d Estimated Date of Delivery: 21 here for ROBBIN:     Pt seen and examined. 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 MWQ reviewed (score 7). Objective:  BP 98/62   Pulse 116   Wt 158 lb 4 oz (71.8 kg)   LMP 2020   BMI 28.94 kg/m²   Gen: AO, NAD  Abd: Soft, NT, gravid    FH: 30 cm    FHT: 150 bpm    Ext: Mild LE edema  OMM: Increased lumbar lordosis      Assessment/Plan:   Diagnosis Orders   1. Prenatal care in third trimester     2. Rh negative state in antepartum period     3. Anemia during pregnancy in third trimester     4. Pre-syncope (IMPROVED)     5. Right ovarian epithelial cancer ([facility] [address]     6. Nausea and vomiting during pregnancy (IMPROVED)        1. Prenatal Care in Second Trimester  - Reassuring maternal / fetal status   - Aneuploidy Screen: declines    - AFP: declines  - Anatomy: 20 nml Male, Ant Placenta, nml CL.   - Flu:   - PNL: PNL: O-/ab-, RI, HIV neg, syphilis neg, HepB neg, UDS neg. Hemoglobin 13.1, GCCT/trich neg, Ucx no growth, NILM 2020    - 28 wk:  / Hgb 10.0 / Plt 248   - Tdap: 21   - Rho Elizabeth: 21    - GBS:   - COVID:       2. Rh Negative in antepartum period  - s/p Rho Elizabeth 21  - needs PP, unless otherwise needed     3. Nausea / Emesis in pregnancy   - Improved     4. Hx of Ovarian Cancer  - Right Ovarian Dysgerminoma - minimal stage IC1  - s/p RSO on 2019  - s/p chemotherapy (3 cycles of BEP) 10/2019 -- follows with [facility]     5. Pre-Syncopal Episodes   - BP / HR normal on exam   - On light duty at work -- doing better now     6. Anemia in third trimester  - Iron rich diet  - cont PNV   - Iron and prn colace    Follow Up  Return OB / Covid precautions reviewed   Return in about 2 weeks (around 2021) for Return OB visit.     Salud Hargrove DO

## 2021-02-16 ENCOUNTER — ROUTINE PRENATAL (OUTPATIENT)
Dept: OBGYN CLINIC | Age: 31
End: 2021-02-16

## 2021-02-16 VITALS
SYSTOLIC BLOOD PRESSURE: 98 MMHG | BODY MASS INDEX: 30.04 KG/M2 | WEIGHT: 164.25 LBS | HEART RATE: 126 BPM | DIASTOLIC BLOOD PRESSURE: 60 MMHG

## 2021-02-16 DIAGNOSIS — C56.1 RIGHT OVARIAN EPITHELIAL CANCER (HCC): ICD-10-CM

## 2021-02-16 DIAGNOSIS — Z34.93 PRENATAL CARE IN THIRD TRIMESTER: Primary | ICD-10-CM

## 2021-02-16 DIAGNOSIS — Z67.91 RH NEGATIVE STATE IN ANTEPARTUM PERIOD: ICD-10-CM

## 2021-02-16 DIAGNOSIS — O26.899 RH NEGATIVE STATE IN ANTEPARTUM PERIOD: ICD-10-CM

## 2021-02-16 PROCEDURE — 0502F SUBSEQUENT PRENATAL CARE: CPT | Performed by: OBSTETRICS & GYNECOLOGY

## 2021-02-16 NOTE — PROGRESS NOTES
Return OB Office Visit    CC:   Chief Complaint   Patient presents with    Routine Prenatal Visit       HPI:  Pt seen and examined. No concerns/complaints. Denies VB, LOF, ctx. +FM. Has been feeling tired but otherwise no issues. Maternal wellness questionnaire reviewed - no concerns today. Score 5. Objective:  BP 98/60   Pulse 126   Wt 164 lb 4 oz (74.5 kg)   LMP 2020   BMI 30.04 kg/m²   Gen: AO, NAD  Abd: Soft, NT  FHT: 140  FH: 31cm    Assessment/Plan:  31 y.o.  at 32w4d (Estimated Date of Delivery: 21) presents for [facility] [address] appointment:     1. Prenatal care in third trimester  - Reassuring maternal / fetal status   - Aneuploidy Screen: declines    - AFP: declines  - Anatomy: 20 nml Male, Ant Placenta, nml CL.   - Flu:   - PNL: PNL: O-/ab-, RI, HIV neg, syphilis neg, HepB neg, UDS neg. Hemoglobin 13.1, GCCT/trich neg, Ucx no growth, NILM 2020    - 28 wk:  / Hgb 10.0 / Plt 248   - Tdap: 21   - Rho Elizabeth: 21    - GBS:   - COVID:      2. Rh negative state in antepartum period  - s/p Rho Elizabeth 21  - needs PP, unless otherwise needed     3.  Right ovarian epithelial cancer ([facility] [address]  - Right Ovarian Dysgerminoma - minimal stage IC1  - s/p RSO on 2019  - s/p chemotherapy (3 cycles of BEP) 10/2019 -- follows with [facility]       Dispo: RTC in 2 weeks  Dennis Gannon MD

## 2021-03-02 ENCOUNTER — ROUTINE PRENATAL (OUTPATIENT)
Dept: OBGYN CLINIC | Age: 31
End: 2021-03-02

## 2021-03-02 VITALS
SYSTOLIC BLOOD PRESSURE: 108 MMHG | HEART RATE: 118 BPM | DIASTOLIC BLOOD PRESSURE: 68 MMHG | BODY MASS INDEX: 29.89 KG/M2 | WEIGHT: 163.4 LBS

## 2021-03-02 DIAGNOSIS — C56.1 RIGHT OVARIAN EPITHELIAL CANCER (HCC): ICD-10-CM

## 2021-03-02 DIAGNOSIS — Z67.91 RH NEGATIVE STATE IN ANTEPARTUM PERIOD: ICD-10-CM

## 2021-03-02 DIAGNOSIS — O26.893 VAGINAL DISCHARGE DURING PREGNANCY IN THIRD TRIMESTER: ICD-10-CM

## 2021-03-02 DIAGNOSIS — O26.899 RH NEGATIVE STATE IN ANTEPARTUM PERIOD: ICD-10-CM

## 2021-03-02 DIAGNOSIS — Z34.93 PRENATAL CARE IN THIRD TRIMESTER: Primary | ICD-10-CM

## 2021-03-02 DIAGNOSIS — N89.8 VAGINAL DISCHARGE DURING PREGNANCY IN THIRD TRIMESTER: ICD-10-CM

## 2021-03-02 PROCEDURE — 0502F SUBSEQUENT PRENATAL CARE: CPT | Performed by: OBSTETRICS & GYNECOLOGY

## 2021-03-02 NOTE — PROGRESS NOTES
Return OB Office Visit    CC:   Chief Complaint   Patient presents with    Routine Prenatal Visit       HPI:  Patient seen and examined. Patient is doing well without complaints. Denies VB, LOF, ctx. +FM. Reports she had an episode of bleeding following intercourse approximately 1 week ago. States earlier this week she feels that she lost her mucus plug. Denies headaches, vision changes, RUQ pain, increased LE edema. Denies chest pain, shortness of breath, fever, chills, nausea, vomiting. Objective:  /68   Pulse 118   Wt 163 lb 6.4 oz (74.1 kg)   LMP 2020   BMI 29.89 kg/m²     Physical Exam  Vitals signs reviewed. Constitutional:       General: She is not in acute distress. Appearance: She is well-developed. HENT:      Head: Normocephalic and atraumatic. Eyes:      Conjunctiva/sclera: Conjunctivae normal.   Cardiovascular:      Rate and Rhythm: Normal rate. Pulmonary:      Effort: Pulmonary effort is normal. No respiratory distress. Abdominal:      General: There is no distension. Palpations: Abdomen is soft. Tenderness: There is no abdominal tenderness. There is no guarding or rebound. Genitourinary:     General: Normal vulva. Vagina: Vaginal discharge (Thick, white vaginal discharge present. ) present. Musculoskeletal:         General: No swelling. Skin:     General: Skin is warm and dry. Neurological:      Mental Status: She is alert and oriented to person, place, and time. Psychiatric:         Mood and Affect: Mood normal.         Behavior: Behavior normal.         Thought Content: Thought content normal.       FHR: 146 bpm by doppler    Assessment/Plan:   Angel Thbiodeaux is a 31 y.o.  at 34w4d who presents for routine OB visit    1.  Prenatal care in third trimester  - Patient is doing well today without complaints  - Fetal wellbeing reassuring by FH and FHR  - Maternal wellness questionnaire reviewed - no concerns today, score 5  - Aneuploidy Screen: declines    - AFP: declines  - Anatomy: 11/25/20 nml Male, Ant Placenta, nml CL.   - Flu:   - PNL: PNL: O-/ab-, RI, HIV neg, syphilis neg, HepB neg, UDS neg. Hemoglobin 13.1, GCCT/trich neg, Ucx no growth, NILM 9/2020    - 28 wk:  / Hgb 10.0 / Plt 248   - Tdap: 1/20/21   - Rho Elizabeth: 1/20/21    - GBS:   - COVID:    - Labor, decreased FM, VB, LOF precautions reviewed  - Follow-up in 2 weeks for ROBBIN visit      2. Rh negative state in antepartum period  - s/p Rho Elizabeth 1/20/21  - plan for PP evaluation     3. 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  Vaginal discharge during pregnancy in third trimester     - Physiologic vs candida     - Will rule out infection today    - VAGINAL PATHOGENS PROBE *A     - Cervix closed, no evidence of labor/ROM    Vimal Marbella, DO

## 2021-03-02 NOTE — PROGRESS NOTES
Temp: 97.2f oral    Maternal emotional well being screening form completed and reviewed with patient. Current score is 5. Patient given referral to [address] [place] [address] ([phone]):  No

## 2021-03-03 LAB
CANDIDA SPECIES, DNA PROBE: ABNORMAL
GARDNERELLA VAGINALIS, DNA PROBE: ABNORMAL
TRICHOMONAS VAGINALIS DNA: ABNORMAL

## 2021-03-06 RX ORDER — METRONIDAZOLE 7.5 MG/G
1 GEL VAGINAL DAILY
Qty: 1 TUBE | Refills: 0 | Status: SHIPPED | OUTPATIENT
Start: 2021-03-06 | End: 2021-03-11

## 2021-03-17 ENCOUNTER — ROUTINE PRENATAL (OUTPATIENT)
Dept: OBGYN CLINIC | Age: 31
End: 2021-03-17

## 2021-03-17 VITALS
BODY MASS INDEX: 30.36 KG/M2 | SYSTOLIC BLOOD PRESSURE: 106 MMHG | HEART RATE: 93 BPM | WEIGHT: 166 LBS | DIASTOLIC BLOOD PRESSURE: 70 MMHG

## 2021-03-17 DIAGNOSIS — O26.899 RH NEGATIVE STATE IN ANTEPARTUM PERIOD: ICD-10-CM

## 2021-03-17 DIAGNOSIS — Z67.91 RH NEGATIVE STATE IN ANTEPARTUM PERIOD: ICD-10-CM

## 2021-03-17 DIAGNOSIS — Z34.93 PRENATAL CARE IN THIRD TRIMESTER: Primary | ICD-10-CM

## 2021-03-17 PROCEDURE — 0502F SUBSEQUENT PRENATAL CARE: CPT | Performed by: OBSTETRICS & GYNECOLOGY

## 2021-03-17 NOTE — PROGRESS NOTES
Temp 97. 0 Oral F    Maternal emotional well being screening form completed and reviewed with patient. Current score is 7.

## 2021-03-20 LAB — GROUP B STREP CULTURE: NORMAL

## 2021-03-22 ENCOUNTER — ROUTINE PRENATAL (OUTPATIENT)
Dept: OBGYN CLINIC | Age: 31
End: 2021-03-22

## 2021-03-22 VITALS
SYSTOLIC BLOOD PRESSURE: 108 MMHG | DIASTOLIC BLOOD PRESSURE: 58 MMHG | WEIGHT: 169.2 LBS | HEART RATE: 99 BPM | BODY MASS INDEX: 30.95 KG/M2

## 2021-03-22 DIAGNOSIS — Z67.91 RH NEGATIVE STATE IN ANTEPARTUM PERIOD: ICD-10-CM

## 2021-03-22 DIAGNOSIS — O99.013 ANEMIA DURING PREGNANCY IN THIRD TRIMESTER: ICD-10-CM

## 2021-03-22 DIAGNOSIS — O26.899 RH NEGATIVE STATE IN ANTEPARTUM PERIOD: ICD-10-CM

## 2021-03-22 DIAGNOSIS — R60.0 BILATERAL LOWER EXTREMITY EDEMA: ICD-10-CM

## 2021-03-22 DIAGNOSIS — C56.1 RIGHT OVARIAN EPITHELIAL CANCER (HCC): ICD-10-CM

## 2021-03-22 DIAGNOSIS — Z34.93 PRENATAL CARE IN THIRD TRIMESTER: Primary | ICD-10-CM

## 2021-03-22 DIAGNOSIS — O21.9 NAUSEA AND VOMITING DURING PREGNANCY: ICD-10-CM

## 2021-03-22 PROCEDURE — 0502F SUBSEQUENT PRENATAL CARE: CPT | Performed by: OBSTETRICS & GYNECOLOGY

## 2021-03-22 NOTE — PROGRESS NOTES
31 y.o.  at 37w3d Estimated Date of Delivery: 21 here for ROBBIN:     Pt seen and examined. Denies LOF,  VB, CTX, endorses FM. Denies CP/ SOB / Fevers / chills. Doing well at work. MWQ reviewed (score 7). Objective:  BP (!) 108/58   Pulse 99   Wt 169 lb 3.2 oz (76.7 kg)   LMP 2020   BMI 30.95 kg/m²   Gen: AO, NAD  Abd: Soft, NT, gravid    FH: 37 cm    FHT: 130 bpm    VE: /-2, anterior and firm   Ext: Mild LE edema  OMM: Increased lumbar lordosis      Assessment/Plan:   Diagnosis Orders   1. Prenatal care in third trimester     2. Rh negative state in antepartum period     3. Nausea and vomiting during pregnancy     4. Right ovarian epithelial cancer ([facility] [address]     5. Anemia during pregnancy in third trimester     6. Bilateral lower extremity edema        1. Prenatal Care in Second Trimester  - Reassuring maternal / fetal status   - Aneuploidy Screen: declines    - AFP: declines  - Anatomy: 20 nml Male, Ant Placenta, nml CL.   - Flu:   - PNL: PNL: O-/ab-, RI, HIV neg, syphilis neg, HepB neg, UDS neg. Hemoglobin 13.1, GCCT/trich neg, Ucx no growth, NILM 2020    - 28 wk:  / Hgb 10.0 / Plt 248   - Tdap: 21   - Rho Elizabeth: 21    - GBS: negative   - COVID:       2. Rh Negative in antepartum period  - s/p Rho Elizabeth 21  - needs PP, unless otherwise needed     3. Hx of Ovarian Cancer  - Right Ovarian Dysgerminoma - minimal stage IC1  - s/p RSO on 2019  - s/p chemotherapy (3 cycles of BEP) 10/2019 -- follows with [facility]     4. Anemia in third trimester  - Iron rich diet  - cont PNV   - Iron and prn colace    Follow Up  Return OB / Covid precautions reviewed   To discuss Covid testing and IOL at next visit   Return in about 1 week (around 3/29/2021) for Return OB visit.     Shannon Machuca,

## 2021-03-29 ENCOUNTER — ROUTINE PRENATAL (OUTPATIENT)
Dept: OBGYN CLINIC | Age: 31
End: 2021-03-29

## 2021-03-29 VITALS
SYSTOLIC BLOOD PRESSURE: 110 MMHG | WEIGHT: 170 LBS | HEART RATE: 113 BPM | DIASTOLIC BLOOD PRESSURE: 66 MMHG | BODY MASS INDEX: 31.09 KG/M2

## 2021-03-29 DIAGNOSIS — C56.1 RIGHT OVARIAN EPITHELIAL CANCER (HCC): ICD-10-CM

## 2021-03-29 DIAGNOSIS — O99.013 ANEMIA DURING PREGNANCY IN THIRD TRIMESTER: ICD-10-CM

## 2021-03-29 DIAGNOSIS — Z34.93 PRENATAL CARE IN THIRD TRIMESTER: Primary | ICD-10-CM

## 2021-03-29 DIAGNOSIS — O26.899 RH NEGATIVE STATE IN ANTEPARTUM PERIOD: ICD-10-CM

## 2021-03-29 DIAGNOSIS — Z67.91 RH NEGATIVE STATE IN ANTEPARTUM PERIOD: ICD-10-CM

## 2021-03-29 DIAGNOSIS — O21.9 NAUSEA AND VOMITING DURING PREGNANCY: ICD-10-CM

## 2021-03-29 PROCEDURE — 0502F SUBSEQUENT PRENATAL CARE: CPT | Performed by: OBSTETRICS & GYNECOLOGY

## 2021-03-29 NOTE — PROGRESS NOTES
31 y.o.  at 38w3d Estimated Date of Delivery: 21 here for ROBBIN:     Pt seen and examined. Denies LOF,  VB, CTX, endorses FM. Denies CP/ SOB / Fevers / chills. Doing well at work. MWQ reviewed (score 6). Reviewed end of pregnancy -- pt interested in induction if she goes beyond due date. Agree. Objective:  /66   Pulse 113   Wt 170 lb (77.1 kg)   LMP 2020   BMI 31.09 kg/m²   Gen: AO, NAD  Abd: Soft, NT, gravid    FH: 37 cm    FHT: 130 bpm    VE: /-2, anterior and firm   Ext: Mild LE edema  OMM: Increased lumbar lordosis      Assessment/Plan:   Diagnosis Orders   1. Prenatal care in third trimester     2. Rh negative state in antepartum period     3. Nausea and vomiting during pregnancy     4. Right ovarian epithelial cancer ([facility] [address]     5. Anemia during pregnancy in third trimester        1. Prenatal Care in Second Trimester  - Reassuring maternal / fetal status   - Aneuploidy Screen: declines    - AFP: declines  - Anatomy: 20 nml Male, Ant Placenta, nml CL.   - Flu:   - PNL: PNL: O-/ab-, RI, HIV neg, syphilis neg, HepB neg, UDS neg. Hemoglobin 13.1, GCCT/trich neg, Ucx no growth, NILM 2020    - 28 wk:  / Hgb 10.0 / Plt 248   - Tdap: 21   - Rho Elizabeth: 21    - GBS: negative   - COVID:       2. Rh Negative in antepartum period  - s/p Rho Elizabeth 21  - needs PP, unless otherwise needed     3. Hx of Ovarian Cancer  - Right Ovarian Dysgerminoma - minimal stage IC1  - s/p RSO on 2019  - s/p chemotherapy (3 cycles of BEP) 10/2019 -- follows with [facility]     4. Anemia in third trimester  - Iron rich diet  - cont PNV   - Iron and prn colace    IOL 21 @ 5pm ([facility] [place] /  Pit)     Follow Up  Return OB / Covid precautions reviewed   To discuss Covid testing and IOL at next visit   Return in about 1 week (around 2021) for Return OB visit.     Yoni Erickson DO

## 2021-03-29 NOTE — PROGRESS NOTES
TEMP 97.5      Maternal emotional well being screening form completed and reviewed with patient. Current score is 6.

## 2021-04-05 ENCOUNTER — ROUTINE PRENATAL (OUTPATIENT)
Dept: OBGYN CLINIC | Age: 31
End: 2021-04-05

## 2021-04-05 ENCOUNTER — OFFICE VISIT (OUTPATIENT)
Dept: OBGYN CLINIC | Age: 31
End: 2021-04-05
Payer: COMMERCIAL

## 2021-04-05 VITALS
HEART RATE: 114 BPM | DIASTOLIC BLOOD PRESSURE: 64 MMHG | WEIGHT: 173.2 LBS | SYSTOLIC BLOOD PRESSURE: 110 MMHG | BODY MASS INDEX: 31.68 KG/M2

## 2021-04-05 DIAGNOSIS — Z34.93 PRENATAL CARE IN THIRD TRIMESTER: Primary | ICD-10-CM

## 2021-04-05 DIAGNOSIS — O21.9 NAUSEA AND VOMITING DURING PREGNANCY: ICD-10-CM

## 2021-04-05 DIAGNOSIS — Z67.91 RH NEGATIVE STATE IN ANTEPARTUM PERIOD: ICD-10-CM

## 2021-04-05 DIAGNOSIS — C56.1 RIGHT OVARIAN EPITHELIAL CANCER (HCC): ICD-10-CM

## 2021-04-05 DIAGNOSIS — O99.013 ANEMIA DURING PREGNANCY IN THIRD TRIMESTER: ICD-10-CM

## 2021-04-05 DIAGNOSIS — O26.899 RH NEGATIVE STATE IN ANTEPARTUM PERIOD: ICD-10-CM

## 2021-04-05 PROCEDURE — 0502F SUBSEQUENT PRENATAL CARE: CPT | Performed by: OBSTETRICS & GYNECOLOGY

## 2021-04-05 PROCEDURE — 76816 OB US FOLLOW-UP PER FETUS: CPT | Performed by: OBSTETRICS & GYNECOLOGY

## 2021-04-05 NOTE — PROGRESS NOTES
Temp-97.5f infrared  Maternal emotional well being screening form completed and reviewed with patient. Current score is 8. Patient given referral to [address] [place] [address] ([phone]):  No

## 2021-04-05 NOTE — PROGRESS NOTES
31 y.o.  at 39w3d Estimated Date of Delivery: 21 here for ROBBIN:     Pt seen and examined. Denies LOF,  VB, CTX, endorses FM. Denies CP/ SOB / Fevers / chills. Doing well at work. MWQ reviewed (score 8). Objective:  /64   Pulse 114   Wt 173 lb 3.2 oz (78.6 kg)   LMP 2020   BMI 31.68 kg/m²   Gen: AO, NAD  Abd: Soft, NT, gravid    FH: 37 cm    FHT: 130 bpm    VE: /-2, anterior, softer  Ext: Mild LE edema  OMM: Increased lumbar lordosis      Assessment/Plan:   Diagnosis Orders   1. Prenatal care in third trimester     2. Rh negative state in antepartum period     3. Nausea and vomiting during pregnancy     4. Right ovarian epithelial cancer ([facility] [address]     5. Anemia during pregnancy in third trimester        1. Prenatal Care in Second Trimester  - Reassuring maternal / fetal status   - Aneuploidy Screen: declines    - AFP: declines  - Anatomy: 20 nml Male, Ant Placenta, nml CL.   - Flu:   - PNL: PNL: O-/ab-, RI, HIV neg, syphilis neg, HepB neg, UDS neg. Hemoglobin 13.1, GCCT/trich neg, Ucx no growth, NILM 2020    - 28 wk:  / Hgb 10.0 / Plt 248   - Tdap: 21   - Rho Elizabeth: 21    - GBS: negative   - COVID: reviewed testing policy     2. Rh Negative in antepartum period  - s/p Rho Elizabeth 21  - needs PP, unless otherwise needed     3. Hx of Ovarian Cancer  - Right Ovarian Dysgerminoma - minimal stage IC1  - s/p RSO on 2019  - s/p chemotherapy (3 cycles of BEP) 10/2019 -- follows with [facility]     4. Anemia in third trimester  - Iron rich diet  - cont PNV   - Iron and prn colace    IOL 21 @ 5pm ([facility] / IV [facility])     Follow Up  Return OB / Covid precautions reviewed   To discuss Covid testing and IOL at next visit   Return in about 3 weeks (around 2021) for Post Partum Visit.     Juan Greene DO

## 2021-04-06 ENCOUNTER — OFFICE VISIT (OUTPATIENT)
Dept: PRIMARY CARE CLINIC | Age: 31
End: 2021-04-06
Payer: COMMERCIAL

## 2021-04-06 DIAGNOSIS — Z01.818 PREOP TESTING: Primary | ICD-10-CM

## 2021-04-06 DIAGNOSIS — Z11.59 SCREENING FOR VIRAL DISEASE: ICD-10-CM

## 2021-04-06 LAB — SARS-COV-2: NOT DETECTED

## 2021-04-06 PROCEDURE — 99211 OFF/OP EST MAY X REQ PHY/QHP: CPT | Performed by: NURSE PRACTITIONER

## 2021-04-06 NOTE — PATIENT INSTRUCTIONS

## 2021-04-11 ENCOUNTER — APPOINTMENT (OUTPATIENT)
Dept: LABOR AND DELIVERY | Age: 31
End: 2021-04-11
Payer: COMMERCIAL

## 2021-04-11 ENCOUNTER — HOSPITAL ENCOUNTER (INPATIENT)
Age: 31
LOS: 5 days | Discharge: HOME | End: 2021-04-16
Attending: OBSTETRICS & GYNECOLOGY | Admitting: OBSTETRICS & GYNECOLOGY
Payer: COMMERCIAL

## 2021-04-11 DIAGNOSIS — O99.013 ANEMIA DURING PREGNANCY IN THIRD TRIMESTER: ICD-10-CM

## 2021-04-11 DIAGNOSIS — Z98.891 S/P CESAREAN SECTION: Primary | ICD-10-CM

## 2021-04-11 PROBLEM — Z34.90 ENCOUNTER FOR INDUCTION OF LABOR: Status: ACTIVE | Noted: 2021-04-11

## 2021-04-11 LAB
ABO/RH: NORMAL
AMPHETAMINE SCREEN, URINE: NORMAL
ANISOCYTOSIS: ABNORMAL
ANTIBODY IDENTIFICATION: NORMAL
ANTIBODY SCREEN: NORMAL
BANDED NEUTROPHILS RELATIVE PERCENT: 15 % (ref 0–7)
BARBITURATE SCREEN URINE: NORMAL
BASOPHILS ABSOLUTE: 0 K/UL (ref 0–0.2)
BASOPHILS RELATIVE PERCENT: 0 %
BENZODIAZEPINE SCREEN, URINE: NORMAL
BUPRENORPHINE URINE: NORMAL
CANNABINOID SCREEN URINE: NORMAL
COCAINE METABOLITE SCREEN URINE: NORMAL
DAT IGG CAPTURE: NORMAL
EOSINOPHILS ABSOLUTE: 0 K/UL (ref 0–0.6)
EOSINOPHILS RELATIVE PERCENT: 0 %
HCT VFR BLD CALC: 27.4 % (ref 36–48)
HEMOGLOBIN: 9.2 G/DL (ref 12–16)
LYMPHOCYTES ABSOLUTE: 0.9 K/UL (ref 1–5.1)
LYMPHOCYTES RELATIVE PERCENT: 6 %
Lab: NORMAL
MACROCYTES: ABNORMAL
MCH RBC QN AUTO: 26 PG (ref 26–34)
MCHC RBC AUTO-ENTMCNC: 33.4 G/DL (ref 31–36)
MCV RBC AUTO: 77.8 FL (ref 80–100)
METHADONE SCREEN, URINE: NORMAL
MICROCYTES: ABNORMAL
MONOCYTES ABSOLUTE: 0.2 K/UL (ref 0–1.3)
MONOCYTES RELATIVE PERCENT: 1 %
NEUTROPHILS ABSOLUTE: 14.7 K/UL (ref 1.7–7.7)
NEUTROPHILS RELATIVE PERCENT: 78 %
OPIATE SCREEN URINE: NORMAL
OVALOCYTES: ABNORMAL
OXYCODONE URINE: NORMAL
PDW BLD-RTO: 16.8 % (ref 12.4–15.4)
PH UA: 6
PHENCYCLIDINE SCREEN URINE: NORMAL
PLATELET # BLD: 272 K/UL (ref 135–450)
PLATELET SLIDE REVIEW: ADEQUATE
PMV BLD AUTO: 8.1 FL (ref 5–10.5)
POIKILOCYTES: ABNORMAL
POLYCHROMASIA: ABNORMAL
PROPOXYPHENE SCREEN: NORMAL
RBC # BLD: 3.52 M/UL (ref 4–5.2)
SLIDE REVIEW: ABNORMAL
WBC # BLD: 15.8 K/UL (ref 4–11)

## 2021-04-11 PROCEDURE — 86780 TREPONEMA PALLIDUM: CPT

## 2021-04-11 PROCEDURE — 86900 BLOOD TYPING SEROLOGIC ABO: CPT

## 2021-04-11 PROCEDURE — 86922 COMPATIBILITY TEST ANTIGLOB: CPT

## 2021-04-11 PROCEDURE — 85025 COMPLETE CBC W/AUTO DIFF WBC: CPT

## 2021-04-11 PROCEDURE — P9016 RBC LEUKOCYTES REDUCED: HCPCS

## 2021-04-11 PROCEDURE — 3E033VJ INTRODUCTION OF OTHER HORMONE INTO PERIPHERAL VEIN, PERCUTANEOUS APPROACH: ICD-10-PCS | Performed by: OBSTETRICS & GYNECOLOGY

## 2021-04-11 PROCEDURE — 86880 COOMBS TEST DIRECT: CPT

## 2021-04-11 PROCEDURE — 1220000000 HC SEMI PRIVATE OB R&B

## 2021-04-11 PROCEDURE — 99222 1ST HOSP IP/OBS MODERATE 55: CPT | Performed by: OBSTETRICS & GYNECOLOGY

## 2021-04-11 PROCEDURE — 86850 RBC ANTIBODY SCREEN: CPT

## 2021-04-11 PROCEDURE — 6360000002 HC RX W HCPCS: Performed by: OBSTETRICS & GYNECOLOGY

## 2021-04-11 PROCEDURE — 86901 BLOOD TYPING SEROLOGIC RH(D): CPT

## 2021-04-11 PROCEDURE — 2580000003 HC RX 258: Performed by: OBSTETRICS & GYNECOLOGY

## 2021-04-11 PROCEDURE — 80307 DRUG TEST PRSMV CHEM ANLYZR: CPT

## 2021-04-11 PROCEDURE — 86870 RBC ANTIBODY IDENTIFICATION: CPT

## 2021-04-11 RX ORDER — HYDROCODONE BITARTRATE AND ACETAMINOPHEN 5; 325 MG/1; MG/1
2 TABLET ORAL EVERY 4 HOURS PRN
Status: DISCONTINUED | OUTPATIENT
Start: 2021-04-11 | End: 2021-04-13

## 2021-04-11 RX ORDER — SODIUM CHLORIDE 0.9 % (FLUSH) 0.9 %
10 SYRINGE (ML) INJECTION PRN
Status: DISCONTINUED | OUTPATIENT
Start: 2021-04-11 | End: 2021-04-13

## 2021-04-11 RX ORDER — HYDROCODONE BITARTRATE AND ACETAMINOPHEN 5; 325 MG/1; MG/1
1 TABLET ORAL EVERY 4 HOURS PRN
Status: DISCONTINUED | OUTPATIENT
Start: 2021-04-11 | End: 2021-04-13

## 2021-04-11 RX ORDER — SODIUM CHLORIDE, SODIUM LACTATE, POTASSIUM CHLORIDE, CALCIUM CHLORIDE 600; 310; 30; 20 MG/100ML; MG/100ML; MG/100ML; MG/100ML
INJECTION, SOLUTION INTRAVENOUS CONTINUOUS
Status: DISCONTINUED | OUTPATIENT
Start: 2021-04-11 | End: 2021-04-13

## 2021-04-11 RX ORDER — IBUPROFEN 800 MG/1
800 TABLET ORAL EVERY 8 HOURS SCHEDULED
Status: DISCONTINUED | OUTPATIENT
Start: 2021-04-11 | End: 2021-04-13

## 2021-04-11 RX ORDER — ACETAMINOPHEN 325 MG/1
650 TABLET ORAL EVERY 4 HOURS PRN
Status: DISCONTINUED | OUTPATIENT
Start: 2021-04-11 | End: 2021-04-16 | Stop reason: HOSPADM

## 2021-04-11 RX ORDER — BUTORPHANOL TARTRATE 1 MG/ML
1 INJECTION, SOLUTION INTRAMUSCULAR; INTRAVENOUS
Status: DISCONTINUED | OUTPATIENT
Start: 2021-04-11 | End: 2021-04-13

## 2021-04-11 RX ORDER — SODIUM CHLORIDE 9 MG/ML
25 INJECTION, SOLUTION INTRAVENOUS PRN
Status: DISCONTINUED | OUTPATIENT
Start: 2021-04-11 | End: 2021-04-16 | Stop reason: HOSPADM

## 2021-04-11 RX ORDER — TERBUTALINE SULFATE 1 MG/ML
0.25 INJECTION, SOLUTION SUBCUTANEOUS ONCE
Status: DISCONTINUED | OUTPATIENT
Start: 2021-04-11 | End: 2021-04-13

## 2021-04-11 RX ORDER — DOCUSATE SODIUM 100 MG/1
100 CAPSULE, LIQUID FILLED ORAL 2 TIMES DAILY
Status: DISCONTINUED | OUTPATIENT
Start: 2021-04-11 | End: 2021-04-13

## 2021-04-11 RX ORDER — ONDANSETRON 2 MG/ML
4 INJECTION INTRAMUSCULAR; INTRAVENOUS EVERY 6 HOURS PRN
Status: DISCONTINUED | OUTPATIENT
Start: 2021-04-11 | End: 2021-04-13

## 2021-04-11 RX ORDER — SIMETHICONE 80 MG
80 TABLET,CHEWABLE ORAL EVERY 6 HOURS PRN
Status: DISCONTINUED | OUTPATIENT
Start: 2021-04-11 | End: 2021-04-13

## 2021-04-11 RX ORDER — SODIUM CHLORIDE 0.9 % (FLUSH) 0.9 %
5-40 SYRINGE (ML) INJECTION EVERY 12 HOURS SCHEDULED
Status: DISCONTINUED | OUTPATIENT
Start: 2021-04-11 | End: 2021-04-13

## 2021-04-11 RX ORDER — DIPHENHYDRAMINE HYDROCHLORIDE 50 MG/ML
25 INJECTION INTRAMUSCULAR; INTRAVENOUS EVERY 4 HOURS PRN
Status: DISCONTINUED | OUTPATIENT
Start: 2021-04-11 | End: 2021-04-13

## 2021-04-11 RX ORDER — LIDOCAINE HYDROCHLORIDE 10 MG/ML
30 INJECTION, SOLUTION EPIDURAL; INFILTRATION; INTRACAUDAL; PERINEURAL PRN
Status: DISCONTINUED | OUTPATIENT
Start: 2021-04-11 | End: 2021-04-13

## 2021-04-11 RX ADMIN — SODIUM CHLORIDE, POTASSIUM CHLORIDE, SODIUM LACTATE AND CALCIUM CHLORIDE: 600; 310; 30; 20 INJECTION, SOLUTION INTRAVENOUS at 18:40

## 2021-04-11 RX ADMIN — Medication 1 MILLI-UNITS/MIN: at 20:00

## 2021-04-11 RX ADMIN — SODIUM CHLORIDE, POTASSIUM CHLORIDE, SODIUM LACTATE AND CALCIUM CHLORIDE: 600; 310; 30; 20 INJECTION, SOLUTION INTRAVENOUS at 23:08

## 2021-04-11 NOTE — H&P
Obstetrics Admission History and Physical    CC: induction of labor    HPI: Mukund Chavez is a 31 y.o.  at 41w4d who presents for scheduled induction of labor. Doing well today, no complaints. Feeling occasional contractions, no VB/LOF. +FM. Pregnancy has been complicated by Rh negative status, anemia, and hx of ovarian cancer. Review of Systems: The following ROS was otherwise negative, except as noted in the HPI: constitutional, HEENT, respiratory, cardiovascular, gastrointestinal, genitourinary, skin, musculoskeletal, neurological, psych. OBGYN Provider : Melody    Obstetrical History:  OB History    Para Term  AB Living   1 0 0 0 0 0   SAB TAB Ectopic Molar Multiple Live Births   0 0 0 0 0 0      # Outcome Date GA Lbr Gilberto/2nd Weight Sex Delivery Anes PTL Lv   1 Current                Past Medical History:   Past Medical History:   Diagnosis Date    Anemia     Depression     Infertility, female     Menopausal symptoms     Right ovarian epithelial cancer ([facility] [address] 2019       Medications:  No current facility-administered medications on file prior to encounter.       Current Outpatient Medications on File Prior to Encounter   Medication Sig Dispense Refill    ferrous sulfate (IRON 325) 325 (65 Fe) MG tablet Take 1 tablet by mouth daily (with breakfast) 180 tablet 1    docusate sodium (COLACE) 100 MG capsule Take 1 capsule by mouth 2 times daily as needed for Constipation 30 capsule 0    Prenatal Multivit-Min-Fe-FA (PRE-NIXON PO) Take by mouth daily      ondansetron (ZOFRAN-ODT) 4 MG disintegrating tablet Take 1 tablet by mouth every 8 hours as needed for Nausea (Patient not taking: Reported on 3/29/2021) 20 tablet 0    promethazine (PHENERGAN) 12.5 MG tablet Take 1 tablet by mouth 3 times daily as needed for Nausea (Patient not taking: Reported on 3/29/2021) 12 tablet 0    pyridoxine (B-6) 25 MG tablet Take 1 tablet by mouth 2 times daily as needed (nausea) (Patient not is alert. Psychiatric:         Mood and Affect: Mood normal.       Cervix: 1/50/-2, vertex    Fetal Heart Monitor Interpretation:   FHT: 125 bpm, moderate variability, + accels, no decels  [place]: q5-10min    Labs:  No visits with results within 1 Day(s) from this visit. Latest known visit with results is:   Office Visit on 2021   Component Date Value    SARS-CoV-2 2021 Not Detected          Assessment/Plan:   31 y.o.  at 40w2d here for IOL    1. FWB-reassuring  - cEFM    2. IOL:  - cervantes balloon placed at 1845 with 60/60 mL  - start pitocin with max dose of 4mU overnight  - titrate pitocin per protocol after CRB out  - recheck PRN or in 12 hours    3. Hx of ovarian cancer  - follows with [facility]  - last chemotherapy 10/2019    4. Anemia  - Hgb 9.2 on admission    5.  O-, RI, GBS neg  - pp rhogam w/u    Dispo: admit for induction of labor, delivery, and postpartum care  Alisa Berg MD

## 2021-04-12 ENCOUNTER — ANESTHESIA (OUTPATIENT)
Dept: LABOR AND DELIVERY | Age: 31
End: 2021-04-12
Payer: COMMERCIAL

## 2021-04-12 ENCOUNTER — ANESTHESIA EVENT (OUTPATIENT)
Dept: LABOR AND DELIVERY | Age: 31
End: 2021-04-12
Payer: COMMERCIAL

## 2021-04-12 LAB — TOTAL SYPHILLIS IGG/IGM: NORMAL

## 2021-04-12 PROCEDURE — 1220000000 HC SEMI PRIVATE OB R&B

## 2021-04-12 PROCEDURE — 3700000025 EPIDURAL BLOCK: Performed by: ANESTHESIOLOGY

## 2021-04-12 PROCEDURE — 2580000003 HC RX 258: Performed by: OBSTETRICS & GYNECOLOGY

## 2021-04-12 PROCEDURE — 51701 INSERT BLADDER CATHETER: CPT

## 2021-04-12 PROCEDURE — 2500000003 HC RX 250 WO HCPCS: Performed by: NURSE ANESTHETIST, CERTIFIED REGISTERED

## 2021-04-12 RX ORDER — EPHEDRINE SULFATE 50 MG/ML
INJECTION INTRAVENOUS
Status: DISPENSED
Start: 2021-04-12 | End: 2021-04-12

## 2021-04-12 RX ORDER — EPHEDRINE SULFATE 50 MG/ML
INJECTION, SOLUTION INTRAVENOUS PRN
Status: DISCONTINUED | OUTPATIENT
Start: 2021-04-12 | End: 2021-04-13 | Stop reason: SDUPTHER

## 2021-04-12 RX ORDER — BUPIVACAINE HYDROCHLORIDE 2.5 MG/ML
INJECTION, SOLUTION EPIDURAL; INFILTRATION; INTRACAUDAL
Status: COMPLETED
Start: 2021-04-12 | End: 2021-04-12

## 2021-04-12 RX ORDER — BUPIVACAINE HYDROCHLORIDE 2.5 MG/ML
INJECTION, SOLUTION EPIDURAL; INFILTRATION; INTRACAUDAL PRN
Status: DISCONTINUED | OUTPATIENT
Start: 2021-04-12 | End: 2021-04-13 | Stop reason: SDUPTHER

## 2021-04-12 RX ADMIN — SODIUM CHLORIDE, POTASSIUM CHLORIDE, SODIUM LACTATE AND CALCIUM CHLORIDE: 600; 310; 30; 20 INJECTION, SOLUTION INTRAVENOUS at 06:30

## 2021-04-12 RX ADMIN — SODIUM CHLORIDE, POTASSIUM CHLORIDE, SODIUM LACTATE AND CALCIUM CHLORIDE: 600; 310; 30; 20 INJECTION, SOLUTION INTRAVENOUS at 07:51

## 2021-04-12 RX ADMIN — Medication 15 ML/HR: at 07:59

## 2021-04-12 RX ADMIN — Medication 15 ML/HR: at 23:47

## 2021-04-12 RX ADMIN — SODIUM CHLORIDE, POTASSIUM CHLORIDE, SODIUM LACTATE AND CALCIUM CHLORIDE: 600; 310; 30; 20 INJECTION, SOLUTION INTRAVENOUS at 10:52

## 2021-04-12 RX ADMIN — EPHEDRINE SULFATE 15 MG: 50 INJECTION INTRAMUSCULAR; INTRAVENOUS; SUBCUTANEOUS at 10:54

## 2021-04-12 RX ADMIN — EPHEDRINE SULFATE 35 MG: 50 INJECTION INTRAMUSCULAR; INTRAVENOUS; SUBCUTANEOUS at 10:55

## 2021-04-12 RX ADMIN — SODIUM CHLORIDE, POTASSIUM CHLORIDE, SODIUM LACTATE AND CALCIUM CHLORIDE: 600; 310; 30; 20 INJECTION, SOLUTION INTRAVENOUS at 18:06

## 2021-04-12 RX ADMIN — SODIUM CHLORIDE, POTASSIUM CHLORIDE, SODIUM LACTATE AND CALCIUM CHLORIDE: 600; 310; 30; 20 INJECTION, SOLUTION INTRAVENOUS at 23:07

## 2021-04-12 RX ADMIN — BUPIVACAINE HYDROCHLORIDE 5 ML: 2.5 INJECTION, SOLUTION EPIDURAL; INFILTRATION; INTRACAUDAL; PERINEURAL at 07:54

## 2021-04-12 NOTE — PROGRESS NOTES
Dave Laureano  Notified by this RN of fhr strip, and interventions performed. MD orders SVE at this time and would like to be updated if there is any cervical change. MD also states that O2 should be placed if fhr strip does not improve with interventions already in place. Will continue to monitor.

## 2021-04-12 NOTE — PROGRESS NOTES
This RN updated  that Janifer Jonel cervantes balloon now out and pt SVE 6-7/80/-2. Marisa Gandhi states pt can have epidural when desired and can now increase pitocin as needed per policy. Will continue to monitor.

## 2021-04-12 NOTE — ANESTHESIA PROCEDURE NOTES
Epidural Block    Patient location during procedure: OB  Reason for block: labor epidural  Staffing  Resident/CRNA: Kassidy Boss APRN - CRNA  Preanesthetic Checklist  Completed: patient identified, IV checked, risks and benefits discussed, monitors and equipment checked, pre-op evaluation, timeout performed, anesthesia consent given, oxygen available and patient being monitored  Epidural  Patient position: sitting  Prep: ChloraPrep and site prepped and draped  Patient monitoring: continuous pulse ox and frequent blood pressure checks  Approach: midline  Location: lumbar (1-5)  Injection technique: DUSTY saline  Provider prep: mask and sterile gloves  Needle  Needle type: Tuohy   Needle gauge: 17 G  Needle length: 3.5 in  Needle insertion depth: 6 cm  Catheter type: side hole  Catheter size: 19 G  Catheter at skin depth: 11 cm  Test dose: negative  Assessment  Hemodynamics: stable  Attempts: 1  Additional Notes  Pt prepped and draped in sterile fashion. Skin wheal with 1% lidocaine. DUSTY with 3 cc NS, 25g spinal needle inserted per melanie. Clear CSF visualized in hub. Needle withdrawn and catheter threaded. Negative test dose 3cc 1.5% Lidocaine with Epinephrine. Sterile dressing applied.

## 2021-04-12 NOTE — PROGRESS NOTES
Department of Obstetrics and Gynecology  Labor and Delivery   Attending Progress Note    Late entry from 17:15   SUBJECTIVE:  32 y/o  female at 40 weeks 3 days gestation with [facility] 2021 presented on the evening of 2021 for scheduled induction of labor. Bowen bulb was placed. Bowen bulb out this AM--cervix 6 cm per nursing staff. Denies vaginal bleeding, loss of fluid. Admits to regular contractions. +FM. Pregnancy is complicated by Rh negative status, anemia and history of ovarian cancer. OBJECTIVE:      Vitals:    BP (!) 95/53   Pulse 107   Temp 98 °F (36.7 °C) (Oral)   Resp 16   Ht 5' 2\" (1.575 m)   Wt 165 lb (74.8 kg)   LMP 2020   SpO2 98%   BMI 30.18 kg/m²     Uterine Size:  40 cm, size consistent with dates    Fetal heart rate:       Baseline Heart Rate:  135        Accelerations:  present       Long Term Variability:  moderate       Decelerations:  absent         Contraction frequency: 2-7 minutes    Fetal Presentation:  Cephalic,       Membranes:  Ruptured clear fluid    Cervix:           Dilation:  6 cm         Effacement:  80         Station:  -1         Consistency:  soft         Position:  anterior              DATA:  LAB REVIEW:    CBC:    Lab Results   Component Value Date    WBC 15.8 2021    RBC 3.52 2021    HGB 9.2 2021    HCT 27.4 2021    MCV 77.8 2021    RDW 16.8 2021     2021       ASSESSMENT & PLAN:  1. Intrauterine pregnancy at 40 weeks 3 days gestation  2. Rh negative  3. Anemia  4.  History of ovarian cancer    Continue pitocin induction  Suspect delayed change due to Bowen bulb effect  Continue to monitor closely

## 2021-04-12 NOTE — PROGRESS NOTES
Department of Obstetrics and Gynecology  Labor and Delivery   Attending Progress Note      SUBJECTIVE:  30 y/o  female at 40 weeks 3 days gestation with [facility] 2021 presented on the evening of 2021 for scheduled induction of labor. Bowen bulb was placed. Bowen bulb out this AM--cervix 6 cm per nursing staff. Denies vaginal bleeding, loss of fluid. Admits to regular contractions. +FM. Pregnancy is complicated by Rh negative status, anemia and history of ovarian cancer. OBJECTIVE:      Vitals:    /62   Pulse 82   Temp 98.1 °F (36.7 °C) (Oral)   Resp 16   Ht 5' 2\" (1.575 m)   Wt 165 lb (74.8 kg)   LMP 2020   SpO2 98%   BMI 30.18 kg/m²     Uterine Size:  40 cm, size consistent with dates    Fetal heart rate:       Baseline Heart Rate:  130        Accelerations:  present       Long Term Variability:  moderate       Decelerations:  absent         Contraction frequency: irregular    Fetal Presentation:  vertex    Membranes:  Ruptured clear fluid  Amniotomy performed for moderate amount of clear fluid    Cervix:           Dilation:  6 cm         Effacement:  80         Station:  -1         Consistency:  soft         Position:  mid              DATA:  LAB REVIEW:    CBC:    Lab Results   Component Value Date    WBC 15.8 2021    RBC 3.52 2021    HGB 9.2 2021    HCT 27.4 2021    MCV 77.8 2021    RDW 16.8 2021     2021       ASSESSMENT & PLAN:    1. Intrauterine pregnancy at 40 weeks 3 days gestation  2. Rh negative  3. Anemia  4. History of ovarian cancer    Amniotomy performed for clear fluid  Continue pitocin  Continue to monitor. Hanna Bro D.O.

## 2021-04-12 NOTE — ANESTHESIA PRE PROCEDURE
Department of Anesthesiology  Preprocedure Note       Name:  Juan Luevano   Age:  31 y.o.  :  1990                                          MRN:  [id number]         Date:  2021      Surgeon: * No surgeons listed *    Procedure: * No procedures listed *    Medications prior to admission:   Prior to Admission medications    Medication Sig Start Date End Date Taking?  Authorizing Provider   ferrous sulfate (IRON 325) 325 (65 Fe) MG tablet Take 1 tablet by mouth daily (with breakfast) 21  Yes Troy Chang DO   docusate sodium (COLACE) 100 MG capsule Take 1 capsule by mouth 2 times daily as needed for Constipation 21  Yes Troy Chang DO   Prenatal Multivit-Min-Fe-FA (PRE- PO) Take by mouth daily   Yes Historical Provider, MD   doxyLAMINE succinate (GNP SLEEP AID) 25 MG tablet Take 0.5 tablets by mouth nightly as needed (nausea)  Patient not taking: Reported on 3/29/2021 9/1/20   Baron Lobito MD       Current medications:    Current Facility-Administered Medications   Medication Dose Route Frequency Provider Last Rate Last Admin    terbutaline (BRETHINE) injection 0.25 mg  0.25 mg Subcutaneous Once Baron Lobito MD        lactated ringers infusion   Intravenous Continuous Baron Lobito  mL/hr at 21 0630 New Bag at 21 0630    sodium chloride flush 0.9 % injection 5-40 mL  5-40 mL Intravenous 2 times per day Baron Lobito MD        sodium chloride flush 0.9 % injection 10 mL  10 mL Intravenous PRN Baron Lobito MD        0.9 % sodium chloride infusion  25 mL Intravenous PRN Baron Lobito MD        lidocaine PF 1 % injection 30 mL  30 mL Other PRN Baron Lobito MD        ondansetron (ZOFRAN) injection 4 mg  4 mg Intravenous Q6H PRN Baron Lobito MD        diphenhydrAMINE (BENADRYL) injection 25 mg  25 mg Intravenous Q4H PRN Baron Lobito MD        acetaminophen (TYLENOL) tablet 650 mg  650 mg Oral Q4H PRN Baron Lobito MD        ibuprofen (ADVIL;MOTRIN) tablet 800 mg  800 mg Oral 3 times per day Karina Carrillo MD        HYDROcodone-acetaminophen (NORCO) 5-325 MG per tablet 1 tablet  1 tablet Oral Q4H PRN Karina Carrillo MD        Or   Alia Herman HYDROcodone-acetaminophen (NORCO) 5-325 MG per tablet 2 tablet  2 tablet Oral Q4H PRN MD Alia Sparks witch hazel-glycerin (TUCKS) pad   Topical PRN Karina Carrillo MD        benzocaine-menthol (DERMOPLAST) 20-0.5 % spray   Topical PRN Karina Carrillo MD        simethicone (MYLICON) chewable tablet 80 mg  80 mg Oral Q6H PRN Karina Carrillo MD        docusate sodium (COLACE) capsule 100 mg  100 mg Oral BID Karina Carrillo MD        butorphanol (STADOL) injection 1 mg  1 mg Intravenous Q3H PRN Karina Carrillo MD        oxytocin (PITOCIN) 30 units in 500 mL infusion  1-20 vikash-units/min Intravenous Continuous Karina Carrillo MD   Stopped at 04/12/21 5466    oxytocin (PITOCIN) 10 unit bolus from the bag  10 Units Intravenous PRN Karina Carrillo MD           Allergies:     Allergies   Allergen Reactions    Latex Other (See Comments)     Redness and burning when exposed to latex    Pcn [Penicillins] Shortness Of Breath and Rash       Problem List:    Patient Active Problem List   Diagnosis Code    Right ovarian epithelial cancer ([address] C56.1    Prenatal care in third trimester Z34.93    Nausea and vomiting during pregnancy O21.9    Rh negative state in antepartum period O26.899, Z67.91    Pre-syncope R55    Encounter for induction of labor Z34.90       Past Medical History:        Diagnosis Date    Anemia     Depression     Infertility, female     Menopausal symptoms     Right ovarian epithelial cancer ([facility] [address] 7/2/2019       Past Surgical History:        Procedure Laterality Date    DENTAL SURGERY      impacted tooth    INSERTION / REMOVAL / REPLACEMENT VENOUS ACCESS CATHETER Left 7/30/2019    LEFT SUBCLAVIAN INFUSAPORT PLACEMENT [facility] EXPLOROTORY LAPAROTOMY, RIGHT SALPINGO OOPHORECTOMY,   PELVIC WASHINGS, BIOPSIES OF CECUM SEROSA. SMALL BOWEL SEROSA, OMENTUM performed by Isabel Gar MD at [address] Right 7/2/2019    INCORRECT PROCEDURE       Social History:    Social History     Tobacco Use    Smoking status: Never Smoker    Smokeless tobacco: Never Used   Substance Use Topics    Alcohol use: Not Currently     Comment: occ                                Counseling given: Not Answered      Vital Signs (Current):   Vitals:    04/12/21 0400 04/12/21 0500 04/12/21 0600 04/12/21 0659   BP: (!) 103/55 (!) 99/59 (!) 102/59 (!) 85/50   Pulse: 102 102 107 82   Resp: 16 16 16 16   Temp: 36.8 °C (98.3 °F)      TempSrc: Oral      Weight:       Height:                                                  BP Readings from Last 3 Encounters:   04/12/21 (!) 85/50   04/05/21 110/64   03/29/21 110/66       NPO Status:                                                                                 BMI:   Wt Readings from Last 3 Encounters:   04/11/21 165 lb (74.8 kg)   04/05/21 173 lb 3.2 oz (78.6 kg)   03/29/21 170 lb (77.1 kg)     Body mass index is 30.18 kg/m². CBC:   Lab Results   Component Value Date    WBC 15.8 04/11/2021    RBC 3.52 04/11/2021    HGB 9.2 04/11/2021    HCT 27.4 04/11/2021    MCV 77.8 04/11/2021    RDW 16.8 04/11/2021     04/11/2021       CMP:   Lab Results   Component Value Date     07/25/2019    K 4.0 07/25/2019     07/25/2019    CO2 26 07/25/2019    BUN 9 07/25/2019    CREATININE 0.5 07/25/2019    GFRAA >60 07/25/2019    LABGLOM >60 07/25/2019    GLUCOSE 85 07/25/2019    CALCIUM 9.2 07/25/2019       POC Tests: No results for input(s): POCGLU, POCNA, POCK, POCCL, POCBUN, POCHEMO, POCHCT in the last 72 hours.     Coags: No results found for: PROTIME, INR, APTT    HCG (If Applicable):   Lab Results   Component Value Date    PREGTESTUR positive 09/01/2020        ABGs: No results found for: PHART, PO2ART, LKP4CXG, IOO2VXK, BEART, I2MDKCTT     Type & Screen (If Applicable):  No results found for: LABABO, LABRH    Drug/Infectious Status (If Applicable):  No results found for: HIV, HEPCAB    COVID-19 Screening (If Applicable):   Lab Results   Component Value Date    COVID19 Not Detected 04/06/2021           Anesthesia Evaluation  Patient summary reviewed and Nursing notes reviewed no history of anesthetic complications:   Airway: Mallampati: II     Neck ROM: full   Dental: normal exam         Pulmonary:Negative Pulmonary ROS and normal exam                               Cardiovascular:Negative CV ROS                      Neuro/Psych:   Negative Neuro/Psych ROS  (+) depression/anxiety             GI/Hepatic/Renal: Neg GI/Hepatic/Renal ROS            Endo/Other: Negative Endo/Other ROS   (+) malignancy/cancer (ovarian Ca). Abdominal:           Vascular:                                        Anesthesia Plan      epidural     ASA 3 - emergent     (I discussed with the patient the risks and benefits of labor epidural placement. All questions were answered the patient agrees with the plan. Recent Vitals:  --------------------            04/12/21               0659     --------------------   BP:     (!) 85/50    Pulse:      82       Resp:       16       Temp:               --------------------  Body mass index is 30.18 kg/m².     Social History    Tobacco Use      Smoking status: Never Smoker      Smokeless tobacco: Never Used      LABS:    CBC  Lab Results       Component                Value               Date/Time                  WBC                      15.8 (H)            04/11/2021 06:10 PM        HGB                      9.2 (L)             04/11/2021 06:10 PM        HCT                      27.4 (L)            04/11/2021 06:10 PM        PLT                      272                 04/11/2021 06:10 PM   RENAL  Lab Results       Component                Value Date/Time                  NA                       138                 07/25/2019 07:38 AM        K                        4.0                 07/25/2019 07:38 AM        CL                       101                 07/25/2019 07:38 AM        CO2                      26                  07/25/2019 07:38 AM        BUN                      9                   07/25/2019 07:38 AM        CREATININE               0.5 (L)             07/25/2019 07:38 AM        GLUCOSE                  85                  07/25/2019 07:38 AM   COAGS  No results found for: PROTIME, INR, APTT)        Anesthetic plan and risks discussed with patient.                       Mu Martins, APRN - CRNA   4/12/2021

## 2021-04-12 NOTE — PROGRESS NOTES
Bowen balloon tugged by this RN and expelled from vagina at this time. Bowen balloons still inflated at removal with non-odorous, brown mucous noted. Pt tolerated well.

## 2021-04-13 VITALS — SYSTOLIC BLOOD PRESSURE: 106 MMHG | DIASTOLIC BLOOD PRESSURE: 56 MMHG | OXYGEN SATURATION: 100 %

## 2021-04-13 LAB
BLOOD BANK DISPENSE STATUS: NORMAL
BLOOD BANK DISPENSE STATUS: NORMAL
BLOOD BANK PRODUCT CODE: NORMAL
BLOOD BANK PRODUCT CODE: NORMAL
BPU ID: NORMAL
BPU ID: NORMAL
DESCRIPTION BLOOD BANK: NORMAL
DESCRIPTION BLOOD BANK: NORMAL
HCT VFR BLD CALC: 22.4 % (ref 36–48)
HCT VFR BLD CALC: 26.6 % (ref 36–48)
HEMOGLOBIN: 7.4 G/DL (ref 12–16)
HEMOGLOBIN: 9.1 G/DL (ref 12–16)
MCH RBC QN AUTO: 27.2 PG (ref 26–34)
MCHC RBC AUTO-ENTMCNC: 34.2 G/DL (ref 31–36)
MCV RBC AUTO: 79.5 FL (ref 80–100)
PDW BLD-RTO: 18 % (ref 12.4–15.4)
PLATELET # BLD: 224 K/UL (ref 135–450)
PMV BLD AUTO: 7 FL (ref 5–10.5)
RBC # BLD: 3.34 M/UL (ref 4–5.2)
WBC # BLD: 13.8 K/UL (ref 4–11)

## 2021-04-13 PROCEDURE — 7100000000 HC PACU RECOVERY - FIRST 15 MIN: Performed by: OBSTETRICS & GYNECOLOGY

## 2021-04-13 PROCEDURE — 2580000003 HC RX 258: Performed by: OBSTETRICS & GYNECOLOGY

## 2021-04-13 PROCEDURE — 6360000002 HC RX W HCPCS: Performed by: NURSE ANESTHETIST, CERTIFIED REGISTERED

## 2021-04-13 PROCEDURE — 3609079900 HC CESAREAN SECTION: Performed by: OBSTETRICS & GYNECOLOGY

## 2021-04-13 PROCEDURE — 85027 COMPLETE CBC AUTOMATED: CPT

## 2021-04-13 PROCEDURE — 59510 CESAREAN DELIVERY: CPT | Performed by: OBSTETRICS & GYNECOLOGY

## 2021-04-13 PROCEDURE — 6370000000 HC RX 637 (ALT 250 FOR IP): Performed by: OBSTETRICS & GYNECOLOGY

## 2021-04-13 PROCEDURE — 7100000001 HC PACU RECOVERY - ADDTL 15 MIN: Performed by: OBSTETRICS & GYNECOLOGY

## 2021-04-13 PROCEDURE — 3700000000 HC ANESTHESIA ATTENDED CARE: Performed by: OBSTETRICS & GYNECOLOGY

## 2021-04-13 PROCEDURE — 85014 HEMATOCRIT: CPT

## 2021-04-13 PROCEDURE — 2709999900 HC NON-CHARGEABLE SUPPLY: Performed by: OBSTETRICS & GYNECOLOGY

## 2021-04-13 PROCEDURE — 36415 COLL VENOUS BLD VENIPUNCTURE: CPT

## 2021-04-13 PROCEDURE — 2500000003 HC RX 250 WO HCPCS: Performed by: NURSE ANESTHETIST, CERTIFIED REGISTERED

## 2021-04-13 PROCEDURE — 3700000001 HC ADD 15 MINUTES (ANESTHESIA): Performed by: OBSTETRICS & GYNECOLOGY

## 2021-04-13 PROCEDURE — 85018 HEMOGLOBIN: CPT

## 2021-04-13 PROCEDURE — 36430 TRANSFUSION BLD/BLD COMPNT: CPT

## 2021-04-13 PROCEDURE — P9016 RBC LEUKOCYTES REDUCED: HCPCS

## 2021-04-13 PROCEDURE — 6360000002 HC RX W HCPCS: Performed by: OBSTETRICS & GYNECOLOGY

## 2021-04-13 PROCEDURE — 1220000000 HC SEMI PRIVATE OB R&B

## 2021-04-13 RX ORDER — MORPHINE SULFATE 0.5 MG/ML
INJECTION, SOLUTION EPIDURAL; INTRATHECAL; INTRAVENOUS PRN
Status: DISCONTINUED | OUTPATIENT
Start: 2021-04-13 | End: 2021-04-13 | Stop reason: SDUPTHER

## 2021-04-13 RX ORDER — SODIUM CHLORIDE 9 MG/ML
25 INJECTION, SOLUTION INTRAVENOUS PRN
Status: DISCONTINUED | OUTPATIENT
Start: 2021-04-13 | End: 2021-04-16 | Stop reason: HOSPADM

## 2021-04-13 RX ORDER — OXYTOCIN 10 [USP'U]/ML
INJECTION, SOLUTION INTRAMUSCULAR; INTRAVENOUS PRN
Status: DISCONTINUED | OUTPATIENT
Start: 2021-04-13 | End: 2021-04-13 | Stop reason: SDUPTHER

## 2021-04-13 RX ORDER — DOCUSATE SODIUM 100 MG/1
100 CAPSULE, LIQUID FILLED ORAL 2 TIMES DAILY
Status: DISCONTINUED | OUTPATIENT
Start: 2021-04-13 | End: 2021-04-16 | Stop reason: HOSPADM

## 2021-04-13 RX ORDER — EPHEDRINE SULFATE 50 MG/ML
INJECTION INTRAVENOUS PRN
Status: DISCONTINUED | OUTPATIENT
Start: 2021-04-13 | End: 2021-04-13 | Stop reason: SDUPTHER

## 2021-04-13 RX ORDER — SODIUM CHLORIDE 0.9 % (FLUSH) 0.9 %
5-40 SYRINGE (ML) INJECTION PRN
Status: DISCONTINUED | OUTPATIENT
Start: 2021-04-13 | End: 2021-04-16 | Stop reason: HOSPADM

## 2021-04-13 RX ORDER — DIPHENHYDRAMINE HYDROCHLORIDE 50 MG/ML
25 INJECTION INTRAMUSCULAR; INTRAVENOUS EVERY 6 HOURS PRN
Status: DISCONTINUED | OUTPATIENT
Start: 2021-04-13 | End: 2021-04-16 | Stop reason: HOSPADM

## 2021-04-13 RX ORDER — MIDAZOLAM HYDROCHLORIDE 1 MG/ML
INJECTION INTRAMUSCULAR; INTRAVENOUS PRN
Status: DISCONTINUED | OUTPATIENT
Start: 2021-04-13 | End: 2021-04-13 | Stop reason: SDUPTHER

## 2021-04-13 RX ORDER — DEXTROSE MONOHYDRATE 50 MG/ML
INJECTION, SOLUTION INTRAVENOUS
Status: DISCONTINUED
Start: 2021-04-13 | End: 2021-04-13

## 2021-04-13 RX ORDER — OXYCODONE HYDROCHLORIDE 5 MG/1
10 TABLET ORAL EVERY 6 HOURS PRN
Status: DISCONTINUED | OUTPATIENT
Start: 2021-04-13 | End: 2021-04-16 | Stop reason: HOSPADM

## 2021-04-13 RX ORDER — SODIUM CHLORIDE 0.9 % (FLUSH) 0.9 %
5-40 SYRINGE (ML) INJECTION EVERY 12 HOURS SCHEDULED
Status: DISCONTINUED | OUTPATIENT
Start: 2021-04-13 | End: 2021-04-16 | Stop reason: HOSPADM

## 2021-04-13 RX ORDER — PRENATAL WITH FERROUS FUM AND FOLIC ACID 3080; 920; 120; 400; 22; 1.84; 3; 20; 10; 1; 12; 200; 27; 25; 2 [IU]/1; [IU]/1; MG/1; [IU]/1; MG/1; MG/1; MG/1; MG/1; MG/1; MG/1; UG/1; MG/1; MG/1; MG/1; MG/1
1 TABLET ORAL DAILY
Status: DISCONTINUED | OUTPATIENT
Start: 2021-04-13 | End: 2021-04-16 | Stop reason: HOSPADM

## 2021-04-13 RX ORDER — ONDANSETRON 2 MG/ML
4 INJECTION INTRAMUSCULAR; INTRAVENOUS EVERY 6 HOURS PRN
Status: DISCONTINUED | OUTPATIENT
Start: 2021-04-13 | End: 2021-04-16 | Stop reason: HOSPADM

## 2021-04-13 RX ORDER — LANOLIN 100 %
OINTMENT (GRAM) TOPICAL
Status: DISCONTINUED | OUTPATIENT
Start: 2021-04-13 | End: 2021-04-16 | Stop reason: HOSPADM

## 2021-04-13 RX ORDER — SODIUM CHLORIDE 9 MG/ML
INJECTION, SOLUTION INTRAVENOUS PRN
Status: DISCONTINUED | OUTPATIENT
Start: 2021-04-13 | End: 2021-04-16 | Stop reason: HOSPADM

## 2021-04-13 RX ORDER — IBUPROFEN 800 MG/1
800 TABLET ORAL EVERY 6 HOURS PRN
Status: DISCONTINUED | OUTPATIENT
Start: 2021-04-13 | End: 2021-04-16 | Stop reason: HOSPADM

## 2021-04-13 RX ORDER — BUPIVACAINE HYDROCHLORIDE 5 MG/ML
INJECTION, SOLUTION EPIDURAL; INTRACAUDAL PRN
Status: DISCONTINUED | OUTPATIENT
Start: 2021-04-13 | End: 2021-04-13 | Stop reason: SDUPTHER

## 2021-04-13 RX ORDER — ACETAMINOPHEN 500 MG
1000 TABLET ORAL EVERY 8 HOURS SCHEDULED
Status: DISCONTINUED | OUTPATIENT
Start: 2021-04-13 | End: 2021-04-16 | Stop reason: HOSPADM

## 2021-04-13 RX ORDER — SIMETHICONE 80 MG
80 TABLET,CHEWABLE ORAL EVERY 6 HOURS PRN
Status: DISCONTINUED | OUTPATIENT
Start: 2021-04-13 | End: 2021-04-16 | Stop reason: HOSPADM

## 2021-04-13 RX ORDER — OXYCODONE HYDROCHLORIDE 5 MG/1
5 TABLET ORAL EVERY 6 HOURS PRN
Status: DISCONTINUED | OUTPATIENT
Start: 2021-04-13 | End: 2021-04-16 | Stop reason: HOSPADM

## 2021-04-13 RX ORDER — LIDOCAINE HYDROCHLORIDE AND EPINEPHRINE BITARTRATE 20; .01 MG/ML; MG/ML
INJECTION, SOLUTION SUBCUTANEOUS PRN
Status: DISCONTINUED | OUTPATIENT
Start: 2021-04-13 | End: 2021-04-13 | Stop reason: SDUPTHER

## 2021-04-13 RX ORDER — SODIUM CHLORIDE, SODIUM LACTATE, POTASSIUM CHLORIDE, CALCIUM CHLORIDE 600; 310; 30; 20 MG/100ML; MG/100ML; MG/100ML; MG/100ML
INJECTION, SOLUTION INTRAVENOUS CONTINUOUS
Status: DISCONTINUED | OUTPATIENT
Start: 2021-04-13 | End: 2021-04-13 | Stop reason: ALTCHOICE

## 2021-04-13 RX ORDER — FAMOTIDINE 20 MG/1
20 TABLET, FILM COATED ORAL 2 TIMES DAILY
Status: DISCONTINUED | OUTPATIENT
Start: 2021-04-13 | End: 2021-04-16 | Stop reason: HOSPADM

## 2021-04-13 RX ORDER — FENTANYL CITRATE 50 UG/ML
INJECTION, SOLUTION INTRAMUSCULAR; INTRAVENOUS PRN
Status: DISCONTINUED | OUTPATIENT
Start: 2021-04-13 | End: 2021-04-13 | Stop reason: SDUPTHER

## 2021-04-13 RX ORDER — ONDANSETRON 2 MG/ML
INJECTION INTRAMUSCULAR; INTRAVENOUS PRN
Status: DISCONTINUED | OUTPATIENT
Start: 2021-04-13 | End: 2021-04-13 | Stop reason: SDUPTHER

## 2021-04-13 RX ORDER — FERROUS SULFATE 325(65) MG
325 TABLET ORAL
Status: DISCONTINUED | OUTPATIENT
Start: 2021-04-13 | End: 2021-04-16 | Stop reason: HOSPADM

## 2021-04-13 RX ORDER — ACETAMINOPHEN 325 MG/1
650 TABLET ORAL EVERY 4 HOURS PRN
Status: DISCONTINUED | OUTPATIENT
Start: 2021-04-13 | End: 2021-04-16 | Stop reason: HOSPADM

## 2021-04-13 RX ADMIN — EPHEDRINE SULFATE 10 MG: 50 INJECTION INTRAVENOUS at 01:55

## 2021-04-13 RX ADMIN — MORPHINE SULFATE 2.5 MG: 0.5 INJECTION EPIDURAL; INTRATHECAL; INTRAVENOUS at 01:28

## 2021-04-13 RX ADMIN — AZITHROMYCIN MONOHYDRATE 500 MG: 500 INJECTION, POWDER, LYOPHILIZED, FOR SOLUTION INTRAVENOUS at 01:14

## 2021-04-13 RX ADMIN — SODIUM CHLORIDE, POTASSIUM CHLORIDE, SODIUM LACTATE AND CALCIUM CHLORIDE: 600; 310; 30; 20 INJECTION, SOLUTION INTRAVENOUS at 01:46

## 2021-04-13 RX ADMIN — EPHEDRINE SULFATE 10 MG: 50 INJECTION INTRAVENOUS at 01:47

## 2021-04-13 RX ADMIN — HYDROMORPHONE HYDROCHLORIDE 0.5 MG: 1 INJECTION, SOLUTION INTRAMUSCULAR; INTRAVENOUS; SUBCUTANEOUS at 04:01

## 2021-04-13 RX ADMIN — EPHEDRINE SULFATE 10 MG: 50 INJECTION INTRAVENOUS at 01:31

## 2021-04-13 RX ADMIN — OXYTOCIN 10 UNITS: 10 INJECTION INTRAVENOUS at 01:46

## 2021-04-13 RX ADMIN — ACETAMINOPHEN 1000 MG: 500 TABLET ORAL at 19:32

## 2021-04-13 RX ADMIN — OXYTOCIN 20 UNITS: 10 INJECTION INTRAVENOUS at 01:28

## 2021-04-13 RX ADMIN — ACETAMINOPHEN 1000 MG: 500 TABLET ORAL at 10:58

## 2021-04-13 RX ADMIN — FENTANYL CITRATE 50 MCG: 50 INJECTION INTRAMUSCULAR; INTRAVENOUS at 01:47

## 2021-04-13 RX ADMIN — EPHEDRINE SULFATE 10 MG: 50 INJECTION INTRAVENOUS at 01:30

## 2021-04-13 RX ADMIN — FENTANYL CITRATE 50 MCG: 50 INJECTION INTRAMUSCULAR; INTRAVENOUS at 01:28

## 2021-04-13 RX ADMIN — ONDANSETRON 4 MG: 2 INJECTION INTRAMUSCULAR; INTRAVENOUS at 01:17

## 2021-04-13 RX ADMIN — LIDOCAINE HYDROCHLORIDE AND EPINEPHRINE 10 ML: 20; 10 INJECTION, SOLUTION INFILTRATION; PERINEURAL at 01:02

## 2021-04-13 RX ADMIN — MIDAZOLAM 2 MG: 1 INJECTION INTRAMUSCULAR; INTRAVENOUS at 01:50

## 2021-04-13 RX ADMIN — BUPIVACAINE HYDROCHLORIDE 6 ML: 5 INJECTION, SOLUTION EPIDURAL; INTRACAUDAL; PERINEURAL at 00:45

## 2021-04-13 RX ADMIN — CEFAZOLIN SODIUM 2000 MG: 10 INJECTION, POWDER, FOR SOLUTION INTRAVENOUS at 01:06

## 2021-04-13 RX ADMIN — DOCUSATE SODIUM 100 MG: 100 CAPSULE, LIQUID FILLED ORAL at 10:59

## 2021-04-13 RX ADMIN — LIDOCAINE HYDROCHLORIDE AND EPINEPHRINE 3 ML: 20; 10 INJECTION, SOLUTION INFILTRATION; PERINEURAL at 01:09

## 2021-04-13 ASSESSMENT — PULMONARY FUNCTION TESTS
PIF_VALUE: 0
PIF_VALUE: 1
PIF_VALUE: 0
PIF_VALUE: 1
PIF_VALUE: 0

## 2021-04-13 ASSESSMENT — PAIN SCALES - GENERAL
PAINLEVEL_OUTOF10: 3
PAINLEVEL_OUTOF10: 3

## 2021-04-13 NOTE — PROGRESS NOTES
states to restart pitocin, and to no longer discontinue pitocin unless she orders to do so. MD also states she will put in internal monitors. Will continue to monitor.

## 2021-04-13 NOTE — PROGRESS NOTES
Department of Obstetrics and Gynecology  Labor and Delivery   Attending Progress Note          Vitals:    BP (!) 101/54   Pulse 90   Temp 98.4 °F (36.9 °C) (Oral)   Resp 16   Ht 5' 2\" (1.575 m)   Wt 165 lb (74.8 kg)   LMP 2020   SpO2 98%   BMI 30.18 kg/m²       Fetal heart rate:       Baseline Heart Rate:  130        Accelerations:  present       Long Term Variability:  moderate       Decelerations:  absent         Contraction frequency: 5 minutes    Fetal Presentation:  Cephalic    Membranes:  Ruptured clear fluid    Cervix:           Dilation:  6 cm         Effacement:  80         Station:  -1         Consistency:  soft         Position:  anterior          Patient unchanged--discussed findings with patient. Patient is ready for . Risks and benefits of procedure reviewed. Charge nurse and anesthesia notified. Will transport to OR. Hanna Bro D.O.

## 2021-04-13 NOTE — L&D DELIVERY SUMMARY NOTE
Department of Obstetrics and Gynecology   Section Note    Indications: failure to progress - arrest of descent    Pre-operative Diagnosis: 40 week 4 day pregnancy. Post-operative Diagnosis: Living  infant(s) and Male    Surgeon: Julia Bro     Assistants: see OR record    Anesthesia: Epidural anesthesia    ASA Class: see anesthesia notes      Procedure Details   The patient was seen in the L&D room. The risks, benefits, complications, treatment options, and expected outcomes were discussed with the patient. The patient concurred with the proposed plan, giving informed consent. The site of surgery properly noted. The patient was taken to Operating Room # 2, identified as Ladarius Garcia and the procedure verified as  Delivery. A Time Out was held and the above information confirmed. After administration of anesthesia, the patient was draped and prepped in the usual sterile manner. The previous Pfannenstiel scar was excised and the incision was carried down through the subcutaneous tissue to the fascia. Fascial incision was made and extended transversely. The fascia was  from the underlying rectus tissue superiorly and inferiorly. The peritoneum was identified and entered. The peritoneal incision was extended longitudinally. A moderate amount of straw colored peritoneal fluid was noted and cleared to better visualize the uterus. The utero-vesical peritoneal reflection was incised transversely and the bladder flap was bluntly freed from the lower uterine segment. A low transverse uterine incision was made. Delivered from vertex presentation was a viable male  with Apgars of 9 and 9 at 1 and 5 minutes. After the umbilical cord was clamped and cut, a segment of cord was collected but later discarded due to fetal wellbeing. Cord blood was obtained for evaluation.  The placenta was removed intact and appeared normal. The uterine outline, tubes and ovaries appeared normal. The uterine incision was closed with a double layer of running locked sutures of 0 Vicryl. Two further figure of eight sutures of 0 vicryl were applied to the midpoint of the incision and hemostasis was observed. The vesico-peritoneal reflection was re-approximated with 2-0 Vicryl. The gutters were cleared of all clots and debris. The peritoneum was re-approximated with 2-0 Vicryl. The fascia was then re-approximated with 0 Vicryl. The subcuticular layer was re-approximated with 2-0 Vicryl in a single interrupted fashion. The skin was reapproximated with 4-0 Vicryl in a subcuticular fashion. Patient tolerated the procedure well and was taken to L&D room in stable condition. Instrument, sponge, and needle counts were correct prior the abdominal closure and at the conclusion of the case. Findings:  Viable male     Intake/Output:     EBL: 1200 cc            Drains: none                Specimens: placenta for hold specimen           Implants: none           Complications:  none           Disposition: PACU - hemodynamically stable. Condition: infant stable and mother stable    Attending Attestation: I performed the procedure.

## 2021-04-13 NOTE — PLAN OF CARE
decrease  Description: Experiences of bladder distention will decrease  Outcome: Completed  Goal: Urinary elimination within specified parameters  Description: Urinary elimination within specified parameters  Outcome: Completed     Problem: Pain:  Goal: Pain level will decrease  Description: Pain level will decrease  Outcome: Completed  Goal: Control of acute pain  Description: Control of acute pain  Outcome: Completed  Goal: Control of chronic pain  Description: Control of chronic pain  Outcome: Completed     Problem: Discharge Planning:  Goal: Discharged to appropriate level of care  Description: Discharged to appropriate level of care  Outcome: Ongoing     Problem: Fluid Volume - Imbalance:  Goal: Absence of postpartum hemorrhage signs and symptoms  Description: Absence of postpartum hemorrhage signs and symptoms  Outcome: Ongoing  Goal: Absence of imbalanced fluid volume signs and symptoms  Description: Absence of imbalanced fluid volume signs and symptoms  Outcome: Ongoing     Problem: Infection - Surgical Site:  Goal: Will show no infection signs and symptoms  Description: Will show no infection signs and symptoms  Outcome: Ongoing     Problem: Mood - Altered:  Goal: Mood stable  Description: Mood stable  Outcome: Ongoing     Problem: Nausea/Vomiting:  Goal: Absence of nausea/vomiting  Description: Absence of nausea/vomiting  Outcome: Ongoing     Problem: Pain - Acute:  Goal: Pain level will decrease  Description: Pain level will decrease  Outcome: Ongoing     Problem: Urinary Retention:  Goal: Urinary elimination within specified parameters  Description: Urinary elimination within specified parameters  Outcome: Ongoing     Problem: Venous Thromboembolism:  Goal: Will show no signs or symptoms of venous thromboembolism  Description: Will show no signs or symptoms of venous thromboembolism  Outcome: Ongoing  Goal: Absence of signs or symptoms of impaired coagulation  Description: Absence of signs or symptoms of impaired coagulation  Outcome: Ongoing

## 2021-04-13 NOTE — PROGRESS NOTES
notified by this RN of persistent late fhr decelerations despite interventions. No orders received at this time . Will continue to monitor.

## 2021-04-13 NOTE — PROGRESS NOTES
Department of Obstetrics and Gynecology  Labor and Delivery   Attending Progress Note      SUBJECTIVE:  32 y/o  female at 40 weeks 3 days gestation with [facility] 2021 presented on the evening of 2021 for scheduled induction of labor. Pregnancy is complicated by Rh negative status, anemia and history of ovarian cancer.     OBJECTIVE:      Vitals:    BP (!) 97/51   Pulse 103   Temp 98.3 °F (36.8 °C) (Oral)   Resp 16   Ht 5' 2\" (1.575 m)   Wt 165 lb (74.8 kg)   LMP 2020   SpO2 98%   BMI 30.18 kg/m²       Fetal heart rate:       Baseline Heart Rate:  135        Accelerations:  present       Long Term Variability:  moderate       Decelerations:  absent         Contraction frequency: 4-5 minutes    Fetal Presentation:  Cephalic    Membranes:  Ruptured clear fluid  IUPC and FSE placed. Cervix:           Dilation:  6 cm         Effacement:  80         Station:  -1         Consistency:  soft         Position:  anterior             DATA:  LAB REVIEW:    CBC:    Lab Results   Component Value Date    WBC 15.8 2021    RBC 3.52 2021    HGB 9.2 2021    HCT 27.4 2021    MCV 77.8 2021    RDW 16.8 2021     2021       ASSESSMENT & PLAN:    1. Intrauterine pregnancy at 40 weeks 3 days gestation  2. Rh negative  3. Anemia  4. History of ovarian cancer     Continue pitocin induction  Continue to monitor closely  Options presented to patient:  Continued induction despite absence of cervical change/progress versus . Risks and benefits reviewed. Patient would like to continue attempts at a vaginal delivery. Open to possible  if no further change--will re-evaluate in 2 hours and consider options.       Maximino Valenzuela D.O.

## 2021-04-13 NOTE — LACTATION NOTE
Lactation Progress Note      Data:   Primip breast feeder who delivered early this am. Baby is currently in SCN. Action: Set up with [employer] pump and teaching done. Assisted with first pump session and explained that colostrum is thick and she will likely only collect a few drops. Encouraged manual expression after pump session. Education provided regarding milk collection and storage, pump cleaning and general breast feeding education. [facility] [place] number on board and encouraged to call for f/u prn. Response: Verbalized and demonstrated understanding.

## 2021-04-14 LAB
HCT VFR BLD CALC: 24.9 % (ref 36–48)
HEMOGLOBIN: 8.4 G/DL (ref 12–16)
MCH RBC QN AUTO: 27 PG (ref 26–34)
MCHC RBC AUTO-ENTMCNC: 33.6 G/DL (ref 31–36)
MCV RBC AUTO: 80.6 FL (ref 80–100)
PDW BLD-RTO: 18.2 % (ref 12.4–15.4)
PLATELET # BLD: 201 K/UL (ref 135–450)
PMV BLD AUTO: 7.5 FL (ref 5–10.5)
RBC # BLD: 3.09 M/UL (ref 4–5.2)
WBC # BLD: 12.4 K/UL (ref 4–11)

## 2021-04-14 PROCEDURE — 99024 POSTOP FOLLOW-UP VISIT: CPT | Performed by: OBSTETRICS & GYNECOLOGY

## 2021-04-14 PROCEDURE — 1220000000 HC SEMI PRIVATE OB R&B

## 2021-04-14 PROCEDURE — 6370000000 HC RX 637 (ALT 250 FOR IP): Performed by: OBSTETRICS & GYNECOLOGY

## 2021-04-14 PROCEDURE — 85027 COMPLETE CBC AUTOMATED: CPT

## 2021-04-14 PROCEDURE — 2580000003 HC RX 258: Performed by: OBSTETRICS & GYNECOLOGY

## 2021-04-14 RX ADMIN — ACETAMINOPHEN 1000 MG: 500 TABLET ORAL at 03:32

## 2021-04-14 RX ADMIN — Medication 10 ML: at 22:24

## 2021-04-14 RX ADMIN — IBUPROFEN 800 MG: 800 TABLET, FILM COATED ORAL at 09:25

## 2021-04-14 RX ADMIN — DOCUSATE SODIUM 100 MG: 100 CAPSULE, LIQUID FILLED ORAL at 22:23

## 2021-04-14 RX ADMIN — IBUPROFEN 800 MG: 800 TABLET, FILM COATED ORAL at 22:24

## 2021-04-14 RX ADMIN — DOCUSATE SODIUM 100 MG: 100 CAPSULE, LIQUID FILLED ORAL at 09:25

## 2021-04-14 RX ADMIN — FERROUS SULFATE TAB 325 MG (65 MG ELEMENTAL FE) 325 MG: 325 (65 FE) TAB at 09:37

## 2021-04-14 RX ADMIN — IBUPROFEN 800 MG: 800 TABLET, FILM COATED ORAL at 15:27

## 2021-04-14 RX ADMIN — FERROUS SULFATE TAB 325 MG (65 MG ELEMENTAL FE) 325 MG: 325 (65 FE) TAB at 15:27

## 2021-04-14 ASSESSMENT — PAIN SCALES - GENERAL: PAINLEVEL_OUTOF10: 4

## 2021-04-14 NOTE — PROGRESS NOTES
[facility]  Post Partum Progress Note    Subjective:   Susanna Teague is a 31 y.o.  s/p PLTCS at 40w4d, PPD #1    Pregnancy has been complicated by Rh negative status, anemia, and hx of ovarian cancer. Patient is doing well this morning. Reports pain is well controlled. Lochia is decreasing. Tolerating regular diet without nausea or vomiting. Ambulating without difficulty, denies dizziness on standing. Voiding without difficulty, + flatus. Denies headache, vision changes, RUQ pain, increased LE edema. Denies chest pain, shortness of breath, fever, chills, calf pain. Objective:   Vitals:    21 0924   BP: (!) 91/54   Pulse: 96   Resp: 18   Temp: 97.9 °F (36.6 °C)   SpO2:       GENERAL APPEARANCE: alert, well appearing, in no apparent distress  LUNGS: clear to auscultation, no wheezes, rales or rhonchi, symmetric air entry  HEART: regular rate and rhythm, no murmurs  ABDOMEN POSTPARTUM: Soft, appropriate tenderness to palpation. Softly distended. No rebound or guarding. Fundus firm and non-tender to palpation. EXTREMITIES: no redness or tenderness in the calves or thighs, no edema  SKIN: normal coloration and turgor, no rashes  NEUROLOGIC: alert, oriented, normal speech, no focal findings or movement disorder noted. Impression: S/P     Pertinent Labs:   Lab Results   Component Value Date    WBC 12.4 (H) 2021    HGB 8.4 (L) 2021    HCT 24.9 (L) 2021    MCV 80.6 2021     2021        Assessment / Plan:  Susanna Teague is a 31 y.o. Tanya Chill at s/p PLTCS at 40w4d    1. POD #1     - Doing well, meeting post-operative milestones     - Voiding without difficulty     - Continue routine care    2. Anemia following postpartum hemorrhage     - QBL      - s/p Transfusion 2 units PRBCs     - Currently asymptomatic     - Hgb stable at 8.4     - Will continue to follow    3. O neg / RI / GBS neg    4.  Infant informationRaymkira Sadler,  - Infant in SCN for pneumothorax    Disposition:   Continue inpatient postpartum care, anticipate discharge home POD #2 or 3 pending clinical course    Esteban Franklin, DO

## 2021-04-14 NOTE — FLOWSHEET NOTE
Discharge Phone Call Log  Patient Name: Paula Valle     [facility] Care Provider: Yana Aparicio DO Discharge Date: 2021    Disposition of baby:    Phone Number: [phone] (home)     Attempts to Contact:  Date:    Nurse  Date:    Nurse  Date:    Nurse    1. Now that you are at home is your pain being well controlled? Y/N   What pain reducing measures are you using? ____________________________________        Information for the patient's :  Alisa Clarisse [[id number]]   Delivery Method: , Low Transverse     2. Are you currently  having any infant feeding issues? Y/N _____________________________ If yes, please explain: __________________________________________________________________  3. If breastfeeding, were you satisfied with the breastfeeding support services offered? Y/N  4.  Have you had to supplement? Y/N If yes, please explain: _____________________________________________________       Did you supplement while in the hospital, or begin formula supplementation at home?________________________________________  5. Did your OB provider offer you information about the benefits of breastfeeding during your prenatal visits? Y/N  6.  Have you made or have you already had your first appointment with the baby's doctor? Y/N If no, do you know when to schedule it? Y/N   7.  Have you scheduled your follow-up appointment? Y/N  If no, do you know when to schedule it? Y/N  8. Did staff discuss safe sleep during your stay? Y/N  Did you see the wall cling posted in your room explaining how to keep you and your baby safe? Y/N  10. Did your nurses and physicians include you in the plan of care, communicating with you respectfully? Y/N If no, please explain __________________________  11. Is there anyone in particular you would like to mention who provided care for you? ________________________________  12. Did your discharge occur in a timely manner?   Y/N If no, please explain __________________________  13. Do you have any other questions or concerns I can address today?  Y/N  __________________________________________________      Teaching During interview :_____________________________________________  ___________________________RN       Date:______________Time:________________

## 2021-04-14 NOTE — PLAN OF CARE
Problem: Discharge Planning:  Goal: Discharged to appropriate level of care  Description: Discharged to appropriate level of care  4/13/2021 2007 by Ponce Stevenson RN  Outcome: Ongoing  4/13/2021 1124 by Leland De Dios RN  Outcome: Ongoing  4/13/2021 0802 by Leland DeD ios RN  Outcome: Ongoing  4/13/2021 0736 by Leland De Dios RN  Outcome: Ongoing     Problem: Fluid Volume - Imbalance:  Goal: Absence of postpartum hemorrhage signs and symptoms  Description: Absence of postpartum hemorrhage signs and symptoms  4/13/2021 2007 by Ponce Stevenson RN  Outcome: Ongoing  4/13/2021 1124 by Leland De Dios RN  Outcome: Ongoing  4/13/2021 0802 by Leland De Dios RN  Outcome: Ongoing  4/13/2021 0736 by Leland De Dios RN  Outcome: Ongoing  Goal: Absence of imbalanced fluid volume signs and symptoms  Description: Absence of imbalanced fluid volume signs and symptoms  4/13/2021 2007 by Ponce Stevenson RN  Outcome: Ongoing  4/13/2021 1124 by Leland De Dios RN  Outcome: Ongoing  4/13/2021 0802 by Leland De Dios RN  Outcome: Ongoing  4/13/2021 0736 by Leland De Dios RN  Outcome: Ongoing     Problem: Infection - Surgical Site:  Goal: Will show no infection signs and symptoms  Description: Will show no infection signs and symptoms  4/13/2021 2007 by Ponce Stevenson RN  Outcome: Ongoing  4/13/2021 1124 by Leland De Dois RN  Outcome: Ongoing  4/13/2021 0802 by Leland De Dios RN  Outcome: Ongoing  4/13/2021 0736 by Leland De Dios RN  Outcome: Ongoing     Problem: Mood - Altered:  Goal: Mood stable  Description: Mood stable  4/13/2021 2007 by Ponce Stevenson RN  Outcome: Ongoing  4/13/2021 1124 by Leland De Dios RN  Outcome: Ongoing  4/13/2021 0802 by Leland De Dios RN  Outcome: Ongoing  4/13/2021 0736 by Leland De Dios RN  Outcome: Ongoing     Problem: Nausea/Vomiting:  Goal: Absence of nausea/vomiting  Description: Absence of nausea/vomiting  4/13/2021 2007 by Marleen Cabrera Fabien Ashford RN  Outcome: Ongoing  4/13/2021 1124 by Jose Cruz Simpson RN  Outcome: Ongoing  4/13/2021 0802 by Jose Cruz Simpson RN  Outcome: Ongoing  4/13/2021 0736 by Jose Cruz Simpson RN  Outcome: Ongoing     Problem: Pain - Acute:  Goal: Pain level will decrease  Description: Pain level will decrease  4/13/2021 2007 by Bhavani Claudio RN  Outcome: Ongoing  4/13/2021 1124 by Jose Cruz Simpson RN  Outcome: Ongoing  4/13/2021 0802 by Jose Cruz Simpson RN  Outcome: Ongoing  4/13/2021 0736 by Jose Cruz Simpson RN  Outcome: Ongoing     Problem: Urinary Retention:  Goal: Urinary elimination within specified parameters  Description: Urinary elimination within specified parameters  4/13/2021 2007 by Bhavani Claudio RN  Outcome: Ongoing  4/13/2021 1124 by Jose Cruz Simpson RN  Outcome: Ongoing  4/13/2021 0802 by Jose Cruz Simpson RN  Outcome: Ongoing  4/13/2021 0736 by Jose Cruz Simpson RN  Outcome: Ongoing     Problem: Venous Thromboembolism:  Goal: Will show no signs or symptoms of venous thromboembolism  Description: Will show no signs or symptoms of venous thromboembolism  4/13/2021 2007 by Bhavani Claudio RN  Outcome: Ongoing  4/13/2021 1124 by Jose Cruz Simpson RN  Outcome: Ongoing  4/13/2021 0802 by Jose Cruz Simpson RN  Outcome: Ongoing  4/13/2021 0736 by Jose Cruz Simpson RN  Outcome: Ongoing  Goal: Absence of signs or symptoms of impaired coagulation  Description: Absence of signs or symptoms of impaired coagulation  4/13/2021 2007 by Bhavani Claudio RN  Outcome: Ongoing  4/13/2021 1124 by Jose Cruz Simpson RN  Outcome: Ongoing  4/13/2021 0802 by Jose Cruz Simpson RN  Outcome: Ongoing  4/13/2021 0736 by Jose Cruz Simpson RN  Outcome: Ongoing

## 2021-04-14 NOTE — LACTATION NOTE
Introduced self as lactation RN for this shift. Name and number written on board. MOB pumping every 3 hours. She stated she was able to get 6 ml last night but not as much today. Is also hand expressing drops of colostrum. Reassured pt of volume able to pump. Plan to go back to the SCN at 2030 to attempt feeding if able to. Will f/u and help with feeding then. No questions or concerns at this time. Encouraged to call prn for f/u.

## 2021-04-14 NOTE — LACTATION NOTE
This note was copied from a baby's chart. Lactation Progress Note      Data:   RN requesting [facility] assistance with at breast feed with baby in SCN. Mob is a 1/0 and has been pumping a few ml Q 3 H. Action: Assisted with good position skin to skin at breast. Mob expressed colostrum to encourage feed. Baby rooting but fussy and unable to latch. Digital suck shows chompy disorganized suck, suspicious of frenulum. Attempted to latch for several minutes and then baby was fed per NG tube per RN. Reassured that continuing to bring baby to breast and express colostrum will reinforce that the breast is a good place. Encouraged to continue with consistent pumping until baby is feeding well at breast. Will f/u prn. Response: Verbalized and demonstrated understanding.

## 2021-04-14 NOTE — ANESTHESIA POSTPROCEDURE EVALUATION
Department of Anesthesiology  Postprocedure Note    Patient: Melecio Ayala  MRN: [id number]  YOB: 1990  Date of evaluation: 2021  Time:  6:37 PM     Procedure Summary     Date: 21 Room / Location: [facility] L&D OR [address] [place] [address]    Anesthesia Start: 8706 Anesthesia Stop: 21 0230    Procedures:        SECTION (N/A Abdomen)      Labor Analgesia Diagnosis: (Arrest of descent)    Surgeons: Lily Gonzales DO Responsible Provider: Alcon Read MD    Anesthesia Type: epidural ASA Status: 3 - Emergent          Anesthesia Type: epidural    Rosa Phase I: Rosa Score: 9    Rosa Phase II: Rosa Score: 10    Last vitals: Reviewed and per EMR flowsheets. Anesthesia Post Evaluation    Complications: no  Comments: Pt. Out of room  All notes and flow-sheets  Reviewed . No apparent anesthesia complications.

## 2021-04-15 PROCEDURE — 1220000000 HC SEMI PRIVATE OB R&B

## 2021-04-15 PROCEDURE — 99024 POSTOP FOLLOW-UP VISIT: CPT | Performed by: OBSTETRICS & GYNECOLOGY

## 2021-04-15 PROCEDURE — 6370000000 HC RX 637 (ALT 250 FOR IP): Performed by: OBSTETRICS & GYNECOLOGY

## 2021-04-15 PROCEDURE — 2580000003 HC RX 258: Performed by: OBSTETRICS & GYNECOLOGY

## 2021-04-15 RX ADMIN — Medication 10 ML: at 11:01

## 2021-04-15 RX ADMIN — DOCUSATE SODIUM 100 MG: 100 CAPSULE, LIQUID FILLED ORAL at 11:00

## 2021-04-15 RX ADMIN — ACETAMINOPHEN 650 MG: 325 TABLET ORAL at 10:59

## 2021-04-15 RX ADMIN — FERROUS SULFATE TAB 325 MG (65 MG ELEMENTAL FE) 325 MG: 325 (65 FE) TAB at 10:59

## 2021-04-15 RX ADMIN — ACETAMINOPHEN 500 MG: 500 TABLET ORAL at 18:56

## 2021-04-15 RX ADMIN — METFORMIN HYDROCHLORIDE 1 TABLET: 500 TABLET, EXTENDED RELEASE ORAL at 11:01

## 2021-04-15 RX ADMIN — IBUPROFEN 800 MG: 800 TABLET, FILM COATED ORAL at 05:19

## 2021-04-15 RX ADMIN — FAMOTIDINE 20 MG: 20 TABLET, FILM COATED ORAL at 11:00

## 2021-04-15 RX ADMIN — IBUPROFEN 800 MG: 800 TABLET, FILM COATED ORAL at 18:57

## 2021-04-15 ASSESSMENT — PAIN SCALES - GENERAL
PAINLEVEL_OUTOF10: 6
PAINLEVEL_OUTOF10: 3
PAINLEVEL_OUTOF10: 4

## 2021-04-15 NOTE — LACTATION NOTE
This note was copied from a baby's chart. Lactation Progress Note      Data:   RN requesting [facility] assistance with 1/0 breast feeder whose baby remains in SCN. Mob says breasts are filling and pumped 6 ml last pump session. Baby is going to breast every other feed and has been taking supplement per bottle after feed. Action: Assisted with good position at breast. Mob expressed colostrum to encourage feed. Baby rooting and a good latch achieved after few attempts. Since baby is latching well, LC set up SNS at breast and baby was able to take full supplement at breast. Will f/u prn. Response: Pleased with feed and SNS.

## 2021-04-15 NOTE — LACTATION NOTE
This note was copied from a baby's chart. Lactation Progress Note      Data:  Lactation follow up to assist with 2030 feeding. Infant able to attempt breastfeeding if RR <70. Respirations 50 at this time. The current plan is for infant to attempt breast feeding every other feeding and being fed through NG for other feedings and post breast feeding attempt. Mother was able to pump 1.75ml of colostrum that she brought back to the SCN. Action: With FOB's help, MOB independently positioned infant with good position and with good breast support. Infant rooted, opened mouth wide and latched on with SHAHEEN, SRS and many AS. After 6 minutes, infant fell asleep relaxed. Even with stimulation, infant was too sleepy to continue to breast feed. Very pleased with feeding and praised MOB for good positioning. Response: MOB and FOB very happy about feeding.

## 2021-04-15 NOTE — PROGRESS NOTES
[facility] Ob/Gyn  Post Partum Progress Note     Subjective:   Chaz Encinas is a 31 y.o.  s/p PLTCS at 40w4d, PPD #2     Pregnancy has been complicated by Rh negative status, anemia, and hx of ovarian cancer.      Patient is doing well. Reports pain is well controlled. Lochia continues to decrease. Tolerating regular diet without nausea or vomiting. Ambulating without difficulty, denies dizziness on standing. Voiding without difficulty, + flatus. Denies headache, vision changes, RUQ pain, increased LE edema. Denies chest pain, shortness of breath, fever, chills, calf pain. Passing flatus. +BM.      Objective:   Vitals:    04/15/21 0518 04/15/21 0519 04/15/21 0523 04/15/21 1058   BP: (!) 91/48 (!) 90/51 (!) 102/58 (!) 97/55   Pulse: 92 90 102 102   Resp:   16 16   Temp:   97.9 °F (36.6 °C) 98.3 °F (36.8 °C)   TempSrc:   Oral Oral   SpO2:       Weight:       Height:            GENERAL APPEARANCE: alert, well appearing, in no apparent distress  LUNGS: clear to auscultation, no wheezes, rales or rhonchi, symmetric air entry  HEART: regular rate and rhythm, no murmurs  ABDOMEN POSTPARTUM: Soft, appropriate tenderness to palpation. Softly distended. No rebound or guarding. Fundus firm and non-tender to palpation. EXTREMITIES: no redness or tenderness in the calves or thighs, no edema  SKIN: normal coloration and turgor, no rashes  NEUROLOGIC: alert, oriented, normal speech, no focal findings or movement disorder noted.     Impression: S/P      Pertinent Labs:         Lab Results   Component Value Date     WBC 12.4 (H) 2021     HGB 8.4 (L) 2021     HCT 24.9 (L) 2021     MCV 80.6 2021      2021         Assessment / Plan:  Chaz Encinas is a 31 y.o. Kriss Pancoast at s/p PLTCS at 40w4d     1. POD #2     - Doing well, meeting post-operative milestones     - Voiding without difficulty     - Continue routine care     2.  Anemia following postpartum hemorrhage - QBL 2093     - s/p Transfusion 2 units PRBCs     - Currently asymptomatic     - Hgb stable at 8.4     - Will continue to follow     3. O neg / RI / GBS neg     4. Infant information: M, 3742g, 9/9     - Infant in SCN for pneumothorax     Disposition:   Continue inpatient postpartum care  Home  POD #3 or 4.

## 2021-04-16 VITALS
HEART RATE: 96 BPM | HEIGHT: 62 IN | BODY MASS INDEX: 30.36 KG/M2 | WEIGHT: 165 LBS | OXYGEN SATURATION: 100 % | TEMPERATURE: 98.1 F | SYSTOLIC BLOOD PRESSURE: 105 MMHG | RESPIRATION RATE: 16 BRPM | DIASTOLIC BLOOD PRESSURE: 72 MMHG

## 2021-04-16 PROCEDURE — 6370000000 HC RX 637 (ALT 250 FOR IP): Performed by: OBSTETRICS & GYNECOLOGY

## 2021-04-16 PROCEDURE — 2580000003 HC RX 258: Performed by: OBSTETRICS & GYNECOLOGY

## 2021-04-16 RX ORDER — OXYCODONE HYDROCHLORIDE 5 MG/1
5 TABLET ORAL EVERY 6 HOURS PRN
Qty: 20 TABLET | Refills: 0 | Status: SHIPPED | OUTPATIENT
Start: 2021-04-16 | End: 2021-04-21

## 2021-04-16 RX ORDER — DOCUSATE SODIUM 100 MG/1
100 CAPSULE, LIQUID FILLED ORAL 2 TIMES DAILY PRN
Qty: 30 CAPSULE | Refills: 0 | Status: SHIPPED | OUTPATIENT
Start: 2021-04-16 | End: 2022-02-23

## 2021-04-16 RX ORDER — FERROUS SULFATE 325(65) MG
325 TABLET ORAL
Qty: 30 TABLET | Refills: 2 | Status: SHIPPED | OUTPATIENT
Start: 2021-04-16 | End: 2022-02-23

## 2021-04-16 RX ORDER — IBUPROFEN 800 MG/1
800 TABLET ORAL EVERY 8 HOURS PRN
Qty: 40 TABLET | Refills: 1 | Status: SHIPPED | OUTPATIENT
Start: 2021-04-16 | End: 2022-02-23

## 2021-04-16 RX ADMIN — FERROUS SULFATE TAB 325 MG (65 MG ELEMENTAL FE) 325 MG: 325 (65 FE) TAB at 09:34

## 2021-04-16 RX ADMIN — Medication 10 ML: at 03:55

## 2021-04-16 RX ADMIN — FAMOTIDINE 20 MG: 20 TABLET, FILM COATED ORAL at 09:34

## 2021-04-16 RX ADMIN — DOCUSATE SODIUM 100 MG: 100 CAPSULE, LIQUID FILLED ORAL at 09:34

## 2021-04-16 RX ADMIN — ACETAMINOPHEN 650 MG: 325 TABLET ORAL at 09:35

## 2021-04-16 RX ADMIN — METFORMIN HYDROCHLORIDE 1 TABLET: 500 TABLET, EXTENDED RELEASE ORAL at 09:34

## 2021-04-16 RX ADMIN — IBUPROFEN 800 MG: 800 TABLET, FILM COATED ORAL at 03:53

## 2021-04-16 RX ADMIN — IBUPROFEN 800 MG: 800 TABLET, FILM COATED ORAL at 09:35

## 2021-04-16 ASSESSMENT — PAIN SCALES - GENERAL
PAINLEVEL_OUTOF10: 2
PAINLEVEL_OUTOF10: 3

## 2021-04-16 NOTE — DISCHARGE SUMMARY
Inpatient Discharge Summary    Admission Diagnosis:    1. IUP 40+2   2. Rh negative status   3. Anemia   4. H/o ovarian cancer  Discharge Diagnosis:    1. Postpartum s/p PLTCD for arrest of dilation   2. Rh negative status   3. Iron deficiency and acute blood loss anemia   4. H/o ovarian cancer  Procedures: PTLCD  Consults: anesthesia, lactation  Significant Findings: Patient delivered a viable male fetus, weight 3742g and apgars 9/9  Complications: none    Hospital course: Admitted for IOL, induced with cervantes bulb and pitocin. Failed to progress past 6cm, fetal intolerance to labor noted and underwent  delivery. Postpartum received 2 units pRBC. Otherwise uncomplicated postpartum course.     Discharge Instructions:  Diet:common adult  Activity:activity as tolerated, no heavy lifting for 6 weeks, pelvic rest x 6 weeks  Medications: see MAR    Disposition: Home  Condition on discharge: Stable  Follow up: in 2 week(s)    Niki Martins MD  [facility] OB/GYN

## 2021-04-16 NOTE — PROGRESS NOTES
Discharge instructions given. Allowed time for questions/answers. Verbalizes understanding of all instructions given. Rx given for Motrin, Percocet, Colace and Iron previously sent to outpt pharmacy and already picked up by pt. Armbands remain in place. Infant in SCN. Pt and FOB ambulated and oriented to guest room 309.

## 2021-04-16 NOTE — PLAN OF CARE
Problem: Discharge Planning:  Goal: Discharged to appropriate level of care  Description: Discharged to appropriate level of care  Outcome: Completed     Problem: Fluid Volume - Imbalance:  Goal: Absence of postpartum hemorrhage signs and symptoms  Description: Absence of postpartum hemorrhage signs and symptoms  Outcome: Completed  Goal: Absence of imbalanced fluid volume signs and symptoms  Description: Absence of imbalanced fluid volume signs and symptoms  Outcome: Completed     Problem: Infection - Surgical Site:  Goal: Will show no infection signs and symptoms  Description: Will show no infection signs and symptoms  Outcome: Completed     Problem: Mood - Altered:  Goal: Mood stable  Description: Mood stable  Outcome: Completed     Problem: Nausea/Vomiting:  Goal: Absence of nausea/vomiting  Description: Absence of nausea/vomiting  Outcome: Completed     Problem: Pain - Acute:  Goal: Pain level will decrease  Description: Pain level will decrease  Outcome: Completed     Problem: Urinary Retention:  Goal: Urinary elimination within specified parameters  Description: Urinary elimination within specified parameters  Outcome: Completed     Problem: Venous Thromboembolism:  Goal: Will show no signs or symptoms of venous thromboembolism  Description: Will show no signs or symptoms of venous thromboembolism  Outcome: Completed  Goal: Absence of signs or symptoms of impaired coagulation  Description: Absence of signs or symptoms of impaired coagulation  Outcome: Completed

## 2021-04-16 NOTE — LACTATION NOTE
This note was copied from a baby's chart. Lactation Progress Note      Data:     F/u during lactation rounds with primip who is breast feeding using SNS for ordered supplement, and pumping. MOB reports she collected 22 mL's with pumping sessions, and feels mature milk supply is coming in. Baby at the breast on consult with SNS and nursing well. Infant came off the breast and rooting. Appears to have a tongue tie with elevation of the tongue. Pt c/o of mild tenderness to nipples but feels this may be from pumping. Action: Breast feeding, and pumping education reviewed. Reviewed appropriate flange fit and how to adjust suction to ensure pumping on a comfortable level. Praise given for volumes expressed. Reviewed breast care, including prevention of engorgement. Encouraged to switch to standard pump setting now that collecting >20 mL's with pumping sessions and shown how to set up with standard setting on breast pump. Encouraged to continue to pump q3h after breast feeding and a minimum of 8x per day. Reviewed importance of SHAHEEN, and explained how a good latch should look and feel. Education provided on possible tongue tie and potential effects on breast feeding relationship. Educated on red flags to monitor for including painful latch and poor milk transfer at the breast. Encouraged to have pediatrician assess if red flags arise. Encouraged pre and post weights with next feeding to help determine milk transfer at the breast now that mature milk is coming in. Educated on lactation support available and how to contact for support as needed. Name and number updated on whiteboard mom's room. Encouraged to call for f/u support prn. Response: Verbalized understanding of teaching provided. Confident with plan of care at this time, breast feeding, using SNS, and pumping. Will call for f/u support prn.

## 2021-04-16 NOTE — PROGRESS NOTES
[facility] Ob/Gyn  Post Partum Progress Note     Subjective:   Wil Simms is a 31 y.o.  s/p PLTCS at 40w4d, PPD #3    Pregnancy has been complicated by Rh negative status, anemia, and hx of ovarian cancer.      Patient is doing well. Reports pain is overall well controlled. Lochia continues to decrease. Tolerating regular diet without nausea or vomiting. Ambulating without difficulty, denies dizziness on standing. Mostly just feeling tired. Voiding without difficulty. Denies headache, vision changes, RUQ pain. Has some increased edema this AM in LE. Denies chest pain, shortness of breath, fever, chills, calf pain. Passing flatus. +BM.      Objective:   Vitals:    04/15/21 0523 04/15/21 1058 04/15/21 1853 21 0350   BP: (!) 102/58 (!) 97/55 107/72 102/62   Pulse: 102 102 101 90   Resp: 16 16 16 16   Temp: 97.9 °F (36.6 °C) 98.3 °F (36.8 °C) 98.1 °F (36.7 °C) 98.2 °F (36.8 °C)   TempSrc: Oral Oral Oral Oral   SpO2:       Weight:       Height:            GENERAL APPEARANCE: alert, well appearing, in no apparent distress  LUNGS: clear to auscultation  HEART: regular rate  ABDOMEN POSTPARTUM: Soft, appropriate tenderness to palpation. No rebound or guarding. Fundus firm and non-tender to palpation. incision c/d/i  EXTREMITIES: no redness or tenderness in the calves or thighs, no edema  SKIN: normal coloration and turgor, no rashes     Impression: S/P      Pertinent Labs:         Lab Results   Component Value Date     WBC 12.4 (H) 2021     HGB 8.4 (L) 2021     HCT 24.9 (L) 2021     MCV 80.6 2021      2021         Assessment / Plan:  Wil Simms is a 31 y.o. Subhash Cole at s/p PLTCS at 40w4d     1. POD #3     - Doing well, meeting post-operative milestones     - Voiding without difficulty     - Continue routine care     2.  Anemia following postpartum hemorrhage     - QBL      - s/p Transfusion 2 units PRBCs     - Currently asymptomatic     -

## 2021-04-19 ENCOUNTER — NURSE ONLY (OUTPATIENT)
Dept: OBGYN CLINIC | Age: 31
End: 2021-04-19

## 2021-04-19 ENCOUNTER — TELEPHONE (OUTPATIENT)
Dept: OBGYN CLINIC | Age: 31
End: 2021-04-19

## 2021-04-19 VITALS — WEIGHT: 159.8 LBS | TEMPERATURE: 97.4 F | BODY MASS INDEX: 29.23 KG/M2

## 2021-04-19 DIAGNOSIS — Z34.93 PRENATAL CARE IN THIRD TRIMESTER: Primary | ICD-10-CM

## 2021-04-19 DIAGNOSIS — R30.0 DYSURIA: ICD-10-CM

## 2021-04-19 DIAGNOSIS — R30.0 DYSURIA: Primary | ICD-10-CM

## 2021-04-19 NOTE — TELEPHONE ENCOUNTER
Pt S/P  on 21. She states she was straight cathed about 3 times and then had a Bowen inserted. She was expecting to have some residual irritation from the catheter but is still having a lot of burning and irritation. Pt wants to ensure that she does not have a UTI. Pt asked to leave a urine today and will be in later to do so. Please advise labs pended.

## 2021-04-20 LAB
BILIRUBIN URINE: NEGATIVE
BLOOD, URINE: ABNORMAL
CLARITY: CLEAR
COLOR: YELLOW
EPITHELIAL CELLS, UA: 1 /HPF (ref 0–5)
GLUCOSE URINE: NEGATIVE MG/DL
HYALINE CASTS: 0 /LPF (ref 0–8)
KETONES, URINE: NEGATIVE MG/DL
LEUKOCYTE ESTERASE, URINE: NEGATIVE
MICROSCOPIC EXAMINATION: YES
NITRITE, URINE: NEGATIVE
PH UA: 6.5 (ref 5–8)
PROTEIN UA: NEGATIVE MG/DL
RBC UA: 4 /HPF (ref 0–4)
SPECIFIC GRAVITY UA: 1.01 (ref 1–1.03)
URINE TYPE: ABNORMAL
UROBILINOGEN, URINE: 0.2 E.U./DL
WBC UA: 2 /HPF (ref 0–5)

## 2021-04-26 ENCOUNTER — TELEPHONE (OUTPATIENT)
Dept: OBGYN CLINIC | Age: 31
End: 2021-04-26

## 2021-04-28 ENCOUNTER — POSTPARTUM VISIT (OUTPATIENT)
Dept: OBGYN CLINIC | Age: 31
End: 2021-04-28
Payer: COMMERCIAL

## 2021-04-28 VITALS
SYSTOLIC BLOOD PRESSURE: 120 MMHG | BODY MASS INDEX: 27.76 KG/M2 | WEIGHT: 151.8 LBS | TEMPERATURE: 97.1 F | HEART RATE: 82 BPM | DIASTOLIC BLOOD PRESSURE: 66 MMHG

## 2021-04-28 DIAGNOSIS — C56.1 RIGHT OVARIAN EPITHELIAL CANCER (HCC): ICD-10-CM

## 2021-04-28 DIAGNOSIS — Z98.891 S/P CESAREAN SECTION: ICD-10-CM

## 2021-04-28 DIAGNOSIS — Z48.89 ENCOUNTER FOR POSTOPERATIVE CARE: Primary | ICD-10-CM

## 2021-04-28 PROCEDURE — 0503F POSTPARTUM CARE VISIT: CPT | Performed by: OBSTETRICS & GYNECOLOGY

## 2021-04-29 PROBLEM — Z67.91 RH NEGATIVE STATE IN ANTEPARTUM PERIOD: Status: RESOLVED | Noted: 2020-10-27 | Resolved: 2021-04-13

## 2021-04-29 PROBLEM — Z34.93 PRENATAL CARE IN THIRD TRIMESTER: Status: RESOLVED | Noted: 2020-09-28 | Resolved: 2021-04-29

## 2021-04-29 PROBLEM — Z34.90 ENCOUNTER FOR INDUCTION OF LABOR: Status: RESOLVED | Noted: 2021-04-11 | Resolved: 2021-04-29

## 2021-04-29 PROBLEM — O21.9 NAUSEA AND VOMITING DURING PREGNANCY: Status: RESOLVED | Noted: 2020-09-28 | Resolved: 2021-04-13

## 2021-04-29 PROBLEM — O26.899 RH NEGATIVE STATE IN ANTEPARTUM PERIOD: Status: RESOLVED | Noted: 2020-10-27 | Resolved: 2021-04-13

## 2021-04-29 ASSESSMENT — ENCOUNTER SYMPTOMS
RECTAL PAIN: 1
RESPIRATORY NEGATIVE: 1
NAUSEA: 0
CONSTIPATION: 0
SHORTNESS OF BREATH: 0
ABDOMINAL PAIN: 0
DIARRHEA: 1
VOMITING: 0

## 2021-04-30 NOTE — PROGRESS NOTES
Maternal emotional well being screening form completed and reviewed with patient. Current score is 3.
diaphoretic. Pulmonary:      Effort: Pulmonary effort is normal.   Abdominal:      General: There is no distension. Palpations: Abdomen is soft. Tenderness: There is no abdominal tenderness. There is no guarding. Comments: Incision clean dry intact. No apparent signs of infection or compromise. Steri strips in place. Patient to remove later today in bath or shower. Skin:     General: Skin is warm and dry. Neurological:      Mental Status: She is alert and oriented to person, place, and time. Psychiatric:         Behavior: Behavior normal.         Thought Content: Thought content normal.         Judgment: Judgment normal.         Assessment:       Diagnosis Orders   1. Encounter for postoperative care     2. Postpartum care following  delivery     3. S/P  section     4. Right ovarian epithelial cancer ([facility] [address]     5. Lactating mother             Plan:      Follow up prn and 4 weeks for postpartum visit.            Krystal Bro DO

## 2021-06-04 ENCOUNTER — POSTPARTUM VISIT (OUTPATIENT)
Dept: OBGYN CLINIC | Age: 31
End: 2021-06-04

## 2021-06-04 VITALS
BODY MASS INDEX: 26.89 KG/M2 | TEMPERATURE: 98.5 F | HEART RATE: 82 BPM | SYSTOLIC BLOOD PRESSURE: 122 MMHG | WEIGHT: 147 LBS | DIASTOLIC BLOOD PRESSURE: 76 MMHG

## 2021-06-04 DIAGNOSIS — Z30.011 ENCOUNTER FOR INITIAL PRESCRIPTION OF CONTRACEPTIVE PILLS: ICD-10-CM

## 2021-06-04 DIAGNOSIS — Z98.891 S/P PRIMARY LOW TRANSVERSE C-SECTION: ICD-10-CM

## 2021-06-04 PROCEDURE — 0503F POSTPARTUM CARE VISIT: CPT | Performed by: OBSTETRICS & GYNECOLOGY

## 2021-06-04 RX ORDER — NORGESTIMATE AND ETHINYL ESTRADIOL 0.25-0.035
1 KIT ORAL DAILY
Qty: 1 PACKET | Refills: 3 | Status: SHIPPED | OUTPATIENT
Start: 2021-06-04 | End: 2022-02-23

## 2021-06-04 ASSESSMENT — ENCOUNTER SYMPTOMS
ABDOMINAL PAIN: 0
VOMITING: 0
NAUSEA: 0
SHORTNESS OF BREATH: 0
RECTAL PAIN: 1
CONSTIPATION: 0
DIARRHEA: 1
RESPIRATORY NEGATIVE: 1

## 2021-06-17 ENCOUNTER — TELEPHONE (OUTPATIENT)
Dept: OBGYN CLINIC | Age: 31
End: 2021-06-17

## 2021-06-17 NOTE — TELEPHONE ENCOUNTER
Pt calling to request an ok to return to work letter be sent to [employer]. She says she returned to work on 6/15/21. Please advise if ok for letter.  ( pt request it be faxed to [phone] when completed)

## 2021-06-18 ENCOUNTER — TELEPHONE (OUTPATIENT)
Dept: OBGYN CLINIC | Age: 31
End: 2021-06-18

## 2021-06-18 RX ORDER — CLINDAMYCIN HYDROCHLORIDE 150 MG/1
450 CAPSULE ORAL 3 TIMES DAILY
Qty: 63 CAPSULE | Refills: 0 | Status: SHIPPED | OUTPATIENT
Start: 2021-06-18 | End: 2021-06-25

## 2021-06-18 NOTE — TELEPHONE ENCOUNTER
Will start treatment for mastitis. With PCN allergy and breast feeding Clindamycin is our next choice. Please watch baby for diarrhea and discontinue if develops. If symptoms do not resolve, worsen or develops fever please seek evaluation in ED this weekend. If symptoms present next week please call to schedule follow-up with Dr. Waqar Petty. Thank you.

## 2021-06-18 NOTE — TELEPHONE ENCOUNTER
[phone] ([place])     Placed call to patient, verbalized understanding. No questions or concerns voiced.

## 2021-06-18 NOTE — TELEPHONE ENCOUNTER
patient called in and stated she has pain in her right breast, no redness, has been massaging the area, and using a heat pack with no relief. Believes she has mastitis. Please advise. Patient has PCN allergy.      Routing to dr. Surjit Turner (oncall)

## 2021-07-23 ENCOUNTER — TELEPHONE (OUTPATIENT)
Dept: OBGYN CLINIC | Age: 31
End: 2021-07-23

## 2021-07-23 ENCOUNTER — PATIENT MESSAGE (OUTPATIENT)
Dept: OBGYN CLINIC | Age: 31
End: 2021-07-23

## 2021-07-23 NOTE — TELEPHONE ENCOUNTER
Can she my chart in the picture? It appears that otherwise her symptoms are stable.  I would recommend an exam next week,    Jerman

## 2021-07-23 NOTE — TELEPHONE ENCOUNTER
From: Kelby Poole  To: Caterina Ni DO  Sent: 7/23/2021 3:44 PM EDT  Subject: Non-Urgent Medical Question    Weird tissue issue

## 2021-07-23 NOTE — TELEPHONE ENCOUNTER
Patient called the office, stated she went to the bathroom, stated she notice something hanging from her, pulled on it and said it  and she took a picture of it, also said it felt like more of it was still inside of her. Stated it is tan in color. Denies odor, changes in discharge, stated she had one period which ended a week ago. She is breast feeding and had a c section about 3 months ago on 4/13/21. hx of ovarian cancer    Please advise.      Routing to Dr. Faraz Garcia(on call)

## 2021-07-23 NOTE — TELEPHONE ENCOUNTER
Patient called d/t she sent a message with a picture, unsure if the picture was received. Informed patient the image was not, she will resend.

## 2021-08-15 ENCOUNTER — TELEPHONE (OUTPATIENT)
Dept: OBGYN CLINIC | Age: 31
End: 2021-08-15

## 2021-08-15 RX ORDER — CLINDAMYCIN HYDROCHLORIDE 150 MG/1
450 CAPSULE ORAL 3 TIMES DAILY
Qty: 63 CAPSULE | Refills: 0 | Status: SHIPPED | OUTPATIENT
Start: 2021-08-15 | End: 2021-08-22

## 2021-08-15 NOTE — TELEPHONE ENCOUNTER
Patient calling in with concerns for mastitis. Has been feeling unwell for ~3 days. Has redness and warmth to left breast, no palpable masses with associated generalized fatigue and fevers. Tmax 103 yesterday, has used some ibuprofen with temps of 100.5 today. Rx for antibiotics sent to her pharmacy today (clindamycin as she is allergic to PCN) and instructed to continue feeding, massage, and use of scheduled NSAIDs. Patient also concerned about potential for COVID due to exposures at work, reviewed if worsening symptoms develop or persistent fevers 24-48 hours after starting treatment would recommend she get tested for this as well. ER precautions also reviewed.      Lela Thorne MD

## 2021-08-31 NOTE — H&P
H&P reviewed and patient examined, no changes noted. I have reviewed the risks, benefits and alternatives to the procedure.   Mina Martin
Provider Name, If Known (E.G., Dr. LIEBERMAN): Dr. Krishna
Detail Level: Detailed

## 2021-12-01 LAB
ESTRADIOL LEVEL: 25 PG/ML
GONADOTROPIN, CHORIONIC (HCG) QUANT: <5 MIU/ML

## 2021-12-04 LAB — MISCELLANEOUS LAB TEST ORDER: NORMAL

## 2021-12-05 LAB — MISCELLANEOUS LAB TEST RESULT: NORMAL

## 2022-01-27 ENCOUNTER — OFFICE VISIT (OUTPATIENT)
Dept: OBGYN CLINIC | Age: 32
End: 2022-01-27
Payer: COMMERCIAL

## 2022-01-27 VITALS
DIASTOLIC BLOOD PRESSURE: 68 MMHG | TEMPERATURE: 97.7 F | SYSTOLIC BLOOD PRESSURE: 102 MMHG | WEIGHT: 131.2 LBS | BODY MASS INDEX: 24 KG/M2 | HEART RATE: 84 BPM

## 2022-01-27 DIAGNOSIS — Z85.43 PERSONAL HISTORY OF OVARIAN CANCER: ICD-10-CM

## 2022-01-27 DIAGNOSIS — Z98.891 S/P PRIMARY LOW TRANSVERSE C-SECTION: ICD-10-CM

## 2022-01-27 DIAGNOSIS — C56.1 RIGHT OVARIAN EPITHELIAL CANCER (HCC): ICD-10-CM

## 2022-01-27 DIAGNOSIS — Z31.69 ENCOUNTER FOR PRECONCEPTION CONSULTATION: ICD-10-CM

## 2022-01-27 DIAGNOSIS — Z30.09 FAMILY PLANNING ADVICE: Primary | ICD-10-CM

## 2022-01-27 PROCEDURE — G8427 DOCREV CUR MEDS BY ELIG CLIN: HCPCS | Performed by: OBSTETRICS & GYNECOLOGY

## 2022-01-27 PROCEDURE — G8484 FLU IMMUNIZE NO ADMIN: HCPCS | Performed by: OBSTETRICS & GYNECOLOGY

## 2022-01-27 PROCEDURE — G8420 CALC BMI NORM PARAMETERS: HCPCS | Performed by: OBSTETRICS & GYNECOLOGY

## 2022-01-27 PROCEDURE — 99212 OFFICE O/P EST SF 10 MIN: CPT | Performed by: OBSTETRICS & GYNECOLOGY

## 2022-01-27 PROCEDURE — 1036F TOBACCO NON-USER: CPT | Performed by: OBSTETRICS & GYNECOLOGY

## 2022-01-27 NOTE — PROGRESS NOTES
[facility] Ob/Gyn   Return Gyn Office Visit    CC:   Chief Complaint   Patient presents with    Follow-up     Family planning discussion        HPI:  36 y.o. who [facility] for discuss planning her next pregnancy. Patient has a history significant for a primary low transverse  for failure to progress at 40 weeks. He admits to a history of abnormal periods. Patient's last menstrual period was 2022. Had 12 days of bleeding that varied between heavy and light. She is also currently breast-feeding. She states she and her  conceived naturally with their first child and did not need onco-fertility assistance. She denies any new history and is not taking any new medications. A history of epithelial ovarian cancer. Has been cleared by Fernanda Molina. She was last seen in the office 2021 and prescribed OCPs--states he took these for 2 months but was poorly compliant and elected to self discontinue. She is not currently on a prenatal vitamin. Review of Systems - The following ROS was otherwise negative, except as noted in the HPI: constitutional, respiratory, cardiovascular, gastrointestinal, genitourinary    Objective:  /68 (Site: Left Upper Arm, Position: Sitting, Cuff Size: Medium Adult)   Pulse 84   Temp 97.7 °F (36.5 °C) (Infrared)   Wt 131 lb 3.2 oz (59.5 kg)   LMP 2022   Breastfeeding Yes   BMI 24.00 kg/m²   General: Alert, well appearing, no acute distress   Resp effort within normal limits   Abdomen: Soft, nontender, nondistended   Pelvic: Deferred  Skin: No visible lesions / rashes / concerning nevus   Extremities: No redness or tenderness, neg Lena's sign  Osteopathic: no TART changes    Assessment/Plan   Diagnosis Orders   1. Family planning advice     2. S/P primary low transverse      3. Lactating mother     4. Personal history of ovarian cancer     5. Right ovarian epithelial cancer ([address]     6.  Encounter for preconception consultation        Preconceptual Counseling performed and the following recommendations are made:     1) Advise folic acid supplementation (400 mcg daily) to reduce the risk of neural tube defects in addition to daily prenatal vitamin   2) BMI Body mass index is 24 kg/m². 3) denies preexisting Conditions   4) reviewed current medications with potentially teratogenic effects   5) STI screening -declined  6) Current on vaccinations including hepatitis B; influenza; measles, mumps, rubella; Tdap; varicella and gardasil   7) Environmental exposures discussed   8) Substance use or abuse discussed     - recommend patient continue trying each month as they have been   - discussed conception rates (cumulative conception rates are around 75% after six months, 90% after a year, and 95% at two years)   - continue taking prenatal vitamin with folic acid   - If unsuccessful at 3-4 MOS, recommend Ovulation Predictor Kits and follow-up for discussion of next steps, may need fertility assistance  - reviewed that if patient becomes pregnant,  may be an option    Follow Up   Return for in 3-4 months if not pregnant.     Katey Burks, DO

## 2022-02-09 ENCOUNTER — HOSPITAL ENCOUNTER (OUTPATIENT)
Dept: CT IMAGING | Age: 32
Discharge: HOME | End: 2022-02-09
Payer: COMMERCIAL

## 2022-02-09 DIAGNOSIS — C56.1 RIGHT OVARIAN EPITHELIAL CANCER (HCC): ICD-10-CM

## 2022-02-09 PROCEDURE — 74177 CT ABD & PELVIS W/CONTRAST: CPT

## 2022-02-09 PROCEDURE — 6360000004 HC RX CONTRAST MEDICATION: Performed by: OBSTETRICS & GYNECOLOGY

## 2022-02-09 RX ADMIN — IOHEXOL 50 ML: 240 INJECTION, SOLUTION INTRATHECAL; INTRAVASCULAR; INTRAVENOUS; ORAL at 10:01

## 2022-02-09 RX ADMIN — IOPAMIDOL 75 ML: 755 INJECTION, SOLUTION INTRAVENOUS at 10:01

## 2022-02-09 NOTE — PROGRESS NOTES
Blinda Fabricio    Age 36 y.o.    female    1990    MRN [id number]    2/28/2022  Arrival Time_____________  OR Time____________[address]     Procedure(s): INFUSAPORT REMOVAL             **LATEX SENSITIVE**                      Monitor Anesthesia Care     Surgeon(s):  Tay Lopes, DO      DAY ADMIT ___  SDS/OP ___  OUTPT IN BED ___         Phone [phone] (home)    PCP _____________________ Phone_________________ Epic ( ) Epic CE ( ) Appt ________    ADDITIONAL INFO __________________________________ Cardio/Consult _____________    NOTES _____________________________________________________________________    ____________________________________________________________________________    PAT APPT DATE:________ TIME: ________  FAXED QAD: _______  (__) H&P w/ hospitalist  ____________________________________________________________________________    COVID TEST: Date/Location______________        NURSING HISTORY COMPLETE: _______  (__) CBC       (__) W/ DIFF ___________  (__)  ECHO    __________  (__) Hgb A1C    ___________  (__) CHEST X RAY   __________  (__) LIPID PROFILE  ___________  (__) EKG   __________  (__) PT/PTT   ___________  (__) PFT's   __________  (__) BMP   ___________  (__) CAROTIDS  __________  (__) CMP   ___________  (__) VEIN MAPPING  __________  (__) U/A   ___________  (__) HISTORY & PHYSICAL __________  (__) URINE C & S  ___________  (__) CARDIAC CLEARANCE __________  (__) U/A W/ FLEX  ___________  (__) PULM.  CLEARANCE __________  (__) SERUM PREGNANCY ___________  (__) Check Epic DOS orders __________  (__) TYPE & SCREEN ________ repeat ( ) (__)  __________________ __________  (__) ALBUMIN   ___________  (__)  __________________ __________  (__) TRANSFERRIN  ___________  (__)  __________________ __________  (__) LIVER PROFILE  ___________  (__)  __________________ __________  (__) CARBOXY HGB  ___________  (__) URINE PREG DOS __________  (__) NICOTINE & MET.  ___________  (__) BLOOD SUGAR

## 2022-02-11 ENCOUNTER — TELEPHONE (OUTPATIENT)
Dept: OBGYN CLINIC | Age: 32
End: 2022-02-11

## 2022-02-11 RX ORDER — CEPHALEXIN 500 MG/1
500 CAPSULE ORAL 4 TIMES DAILY
Qty: 28 CAPSULE | Refills: 0 | Status: SHIPPED | OUTPATIENT
Start: 2022-02-11 | End: 2022-02-18

## 2022-02-23 ENCOUNTER — HOSPITAL ENCOUNTER (OUTPATIENT)
Age: 32
Discharge: HOME | End: 2022-02-27
Payer: COMMERCIAL

## 2022-02-23 ENCOUNTER — ANESTHESIA EVENT (OUTPATIENT)
Dept: OPERATING ROOM | Age: 32
End: 2022-02-23
Payer: COMMERCIAL

## 2022-02-23 PROCEDURE — U0003 INFECTIOUS AGENT DETECTION BY NUCLEIC ACID (DNA OR RNA); SEVERE ACUTE RESPIRATORY SYNDROME CORONAVIRUS 2 (SARS-COV-2) (CORONAVIRUS DISEASE [COVID-19]), AMPLIFIED PROBE TECHNIQUE, MAKING USE OF HIGH THROUGHPUT TECHNOLOGIES AS DESCRIBED BY CMS-2020-01-R: HCPCS

## 2022-02-23 PROCEDURE — U0005 INFEC AGEN DETEC AMPLI PROBE: HCPCS

## 2022-02-23 NOTE — PROGRESS NOTES
1. Do not eat or drink anything after 12 midnight prior to surgery. This includes no water, chewing gum mints, or ice chips. You may brush your teeth and gargle the day of surgery but DO NOT SWALLOW THE WATER. 2. Please see your family doctor/pediatrician for a history and physical and/or concerning medications. Bring any test results/reports from your physician's office. If you are under the care of a heart doctor or specialist please be aware that you may be asked to see him or her for clearance. 3. You may be asked to stop blood thinners such as Coumadin, Plavix, Fragmin, and Lovenox or Anti-inflammatories such as Aspirin, Ibuprofen, Advil, and Naproxen prior to your surgery. Please check with your doctor before stopping these or any other medications. 4. Do not smoke, and do not drink any alcoholic beverages 24 hours prior to surgery. 5. You MUST make arrangements for a responsible adult to take you home after your surgery. For your safety, you will not be allowed to leave alone or drive yourself home. Your surgery will be cancelled if you do not have a ride home. Also for your safety, it is strongly suggested someone stay with you the first 24 hrs after your surgery. 6. A parent/legal guardian must accompany a child scheduled for surgery and plan to stay at the hospital until the child is discharged. Please do not bring other children with you. 7. For your comfort,please wear simple, loose fitting clothing to the hospital.  Please do not bring valuables (money, credit cards, checkbooks, etc.) Do not wear any makeup (including no eye makeup) or nail polish on your fingers or toes. 8. For your safety, please DO NOT wear any jewelry or piercings on day of surgery. All body piercing jewelry must be removed. 9. If you have dentures, they will be removed before going to the OR; for your convenience we will provide you with a container.   If you wear contact lenses or glasses, they will be removed, they will be removed, please bring a case for them. 10. If appicable,Please see your family doctor/pediatrician for a history & physical and/or concerning medications. Bring any test results/reports from your physician's office. 11. Remember to bring Blood Bank bracelet to the hospital on the day of surgery. 12. If you have a Living Will and Durable Power of  for Healthcare, please bring in a copy. 15. Notify your Surgeon if you develop any illness between now and surgery  time, cough, cold, fever, sore throat, nausea, vomiting, etc.  Please notify your surgeon if you experience dizziness, shortness of breath or blurred vision between now & the time of your surgery   14. DO NOT shave your operative site 96 hours prior to surgery. For face & neck surgery, men may use an electric razor 48 hours prior to surgery. 15. Shower the night before surgery with _X__Antibacterial soap ___Hibiclens   16. To provide excellent care visitors will be limited to one in the room at any given time. 17.  Please bring picture ID and insurance card. 18.  Visit our web site for additional information:  [employer]/surgery.

## 2022-02-23 NOTE — PROGRESS NOTES
Preoperative Screening for Elective Surgery/Invasive Procedures While COVID-19 present in the community     Have you had any of the following symptoms? NONE  o Fever, chills  o Cough  o Shortness of breath  o Muscle aches/pain  o Diarrhea  o Abdominal pain, nausea, vomiting  o Loss or decrease in taste and / or smell   Risk of Exposure  o Have you recently been hospitalized for COVID-19 or flu-like illness, if so when? NO  o Recently diagnosed with COVID-19, if so when? NO  o Recently tested for COVID-19, if so when? YES 2/23/22 RESULT PENDING FOR PREOP  o Have you been in close contact with a person or family member who currently has or recently had [employer] [address]? If yes, when and in what context? NO  o Do you live with anybody who in the last 14 days has had fever, chills, shortness of breath, muscle aches, flu-like illness? NO  o Do you have any close contacts or family members who are currently in the hospital for COVID-19 or flu-like illness? If yes, assess recent close contact with this person. NO    Indicate if the patient has a positive screen by answering yes to one or more of the above questions. Patients who test positive or screen positive prior to surgery or on the day of surgery should be evaluated in conjunction with the surgeon/proceduralist/anesthesiologist to determine the urgency of the procedure.

## 2022-02-24 LAB — SARS-COV-2: NOT DETECTED

## 2022-02-28 ENCOUNTER — TELEPHONE (OUTPATIENT)
Dept: OBGYN CLINIC | Age: 32
End: 2022-02-28

## 2022-02-28 ENCOUNTER — HOSPITAL ENCOUNTER (OUTPATIENT)
Age: 32
Setting detail: OUTPATIENT SURGERY
Discharge: HOME | End: 2022-02-28
Attending: OBSTETRICS & GYNECOLOGY | Admitting: OBSTETRICS & GYNECOLOGY
Payer: COMMERCIAL

## 2022-02-28 ENCOUNTER — ANESTHESIA (OUTPATIENT)
Dept: OPERATING ROOM | Age: 32
End: 2022-02-28
Payer: COMMERCIAL

## 2022-02-28 VITALS
HEART RATE: 68 BPM | TEMPERATURE: 97.5 F | BODY MASS INDEX: 23.92 KG/M2 | RESPIRATION RATE: 16 BRPM | HEIGHT: 62 IN | DIASTOLIC BLOOD PRESSURE: 70 MMHG | WEIGHT: 130 LBS | SYSTOLIC BLOOD PRESSURE: 104 MMHG | OXYGEN SATURATION: 98 %

## 2022-02-28 VITALS — DIASTOLIC BLOOD PRESSURE: 49 MMHG | OXYGEN SATURATION: 99 % | SYSTOLIC BLOOD PRESSURE: 85 MMHG | TEMPERATURE: 96 F

## 2022-02-28 LAB
ANION GAP SERPL CALCULATED.3IONS-SCNC: 12 MMOL/L (ref 3–16)
BUN BLDV-MCNC: 8 MG/DL (ref 7–20)
CALCIUM SERPL-MCNC: 8.7 MG/DL (ref 8.3–10.6)
CHLORIDE BLD-SCNC: 102 MMOL/L (ref 99–110)
CO2: 23 MMOL/L (ref 21–32)
CREAT SERPL-MCNC: <0.5 MG/DL (ref 0.6–1.1)
GFR AFRICAN AMERICAN: >60
GFR NON-AFRICAN AMERICAN: >60
GLUCOSE BLD-MCNC: 95 MG/DL (ref 70–99)
HCT VFR BLD CALC: 40.8 % (ref 36–48)
HEMOGLOBIN: 13.6 G/DL (ref 12–16)
MCH RBC QN AUTO: 29.1 PG (ref 26–34)
MCHC RBC AUTO-ENTMCNC: 33.4 G/DL (ref 31–36)
MCV RBC AUTO: 87.2 FL (ref 80–100)
PDW BLD-RTO: 13.6 % (ref 12.4–15.4)
PLATELET # BLD: 197 K/UL (ref 135–450)
PMV BLD AUTO: 7.7 FL (ref 5–10.5)
POTASSIUM REFLEX MAGNESIUM: 4.8 MMOL/L (ref 3.5–5.1)
PREGNANCY, URINE: NEGATIVE
PREGNANCY, URINE: NEGATIVE
RBC # BLD: 4.68 M/UL (ref 4–5.2)
SODIUM BLD-SCNC: 137 MMOL/L (ref 136–145)
WBC # BLD: 6.1 K/UL (ref 4–11)

## 2022-02-28 PROCEDURE — 2500000003 HC RX 250 WO HCPCS: Performed by: ANESTHESIOLOGY

## 2022-02-28 PROCEDURE — A4217 STERILE WATER/SALINE, 500 ML: HCPCS | Performed by: OBSTETRICS & GYNECOLOGY

## 2022-02-28 PROCEDURE — 3600000002 HC SURGERY LEVEL 2 BASE: Performed by: OBSTETRICS & GYNECOLOGY

## 2022-02-28 PROCEDURE — 2500000003 HC RX 250 WO HCPCS: Performed by: NURSE ANESTHETIST, CERTIFIED REGISTERED

## 2022-02-28 PROCEDURE — 6360000002 HC RX W HCPCS: Performed by: NURSE ANESTHETIST, CERTIFIED REGISTERED

## 2022-02-28 PROCEDURE — 2580000003 HC RX 258: Performed by: OBSTETRICS & GYNECOLOGY

## 2022-02-28 PROCEDURE — 84703 CHORIONIC GONADOTROPIN ASSAY: CPT

## 2022-02-28 PROCEDURE — 80048 BASIC METABOLIC PNL TOTAL CA: CPT

## 2022-02-28 PROCEDURE — 7100000010 HC PHASE II RECOVERY - FIRST 15 MIN: Performed by: OBSTETRICS & GYNECOLOGY

## 2022-02-28 PROCEDURE — 7100000011 HC PHASE II RECOVERY - ADDTL 15 MIN: Performed by: OBSTETRICS & GYNECOLOGY

## 2022-02-28 PROCEDURE — 3600000012 HC SURGERY LEVEL 2 ADDTL 15MIN: Performed by: OBSTETRICS & GYNECOLOGY

## 2022-02-28 PROCEDURE — 2709999900 HC NON-CHARGEABLE SUPPLY: Performed by: OBSTETRICS & GYNECOLOGY

## 2022-02-28 PROCEDURE — 85027 COMPLETE CBC AUTOMATED: CPT

## 2022-02-28 PROCEDURE — 6360000002 HC RX W HCPCS: Performed by: OBSTETRICS & GYNECOLOGY

## 2022-02-28 PROCEDURE — 3700000001 HC ADD 15 MINUTES (ANESTHESIA): Performed by: OBSTETRICS & GYNECOLOGY

## 2022-02-28 PROCEDURE — 2580000003 HC RX 258: Performed by: ANESTHESIOLOGY

## 2022-02-28 PROCEDURE — 3700000000 HC ANESTHESIA ATTENDED CARE: Performed by: OBSTETRICS & GYNECOLOGY

## 2022-02-28 PROCEDURE — 2500000003 HC RX 250 WO HCPCS: Performed by: OBSTETRICS & GYNECOLOGY

## 2022-02-28 RX ORDER — FLUCONAZOLE 150 MG/1
150 TABLET ORAL
Qty: 2 TABLET | Refills: 0 | Status: SHIPPED | OUTPATIENT
Start: 2022-02-28 | End: 2022-03-06

## 2022-02-28 RX ORDER — BUPIVACAINE HYDROCHLORIDE AND EPINEPHRINE 2.5; 5 MG/ML; UG/ML
INJECTION, SOLUTION EPIDURAL; INFILTRATION; INTRACAUDAL; PERINEURAL PRN
Status: DISCONTINUED | OUTPATIENT
Start: 2022-02-28 | End: 2022-02-28 | Stop reason: ALTCHOICE

## 2022-02-28 RX ORDER — SODIUM CHLORIDE 0.9 % (FLUSH) 0.9 %
5-40 SYRINGE (ML) INJECTION EVERY 12 HOURS SCHEDULED
Status: DISCONTINUED | OUTPATIENT
Start: 2022-02-28 | End: 2022-02-28 | Stop reason: HOSPADM

## 2022-02-28 RX ORDER — MEPERIDINE HYDROCHLORIDE 50 MG/ML
12.5 INJECTION INTRAMUSCULAR; INTRAVENOUS; SUBCUTANEOUS EVERY 5 MIN PRN
Status: DISCONTINUED | OUTPATIENT
Start: 2022-02-28 | End: 2022-02-28 | Stop reason: HOSPADM

## 2022-02-28 RX ORDER — FENTANYL CITRATE 50 UG/ML
INJECTION, SOLUTION INTRAMUSCULAR; INTRAVENOUS PRN
Status: DISCONTINUED | OUTPATIENT
Start: 2022-02-28 | End: 2022-02-28 | Stop reason: SDUPTHER

## 2022-02-28 RX ORDER — ONDANSETRON 2 MG/ML
4 INJECTION INTRAMUSCULAR; INTRAVENOUS
Status: DISCONTINUED | OUTPATIENT
Start: 2022-02-28 | End: 2022-02-28 | Stop reason: HOSPADM

## 2022-02-28 RX ORDER — SODIUM CHLORIDE 0.9 % (FLUSH) 0.9 %
5-40 SYRINGE (ML) INJECTION PRN
Status: DISCONTINUED | OUTPATIENT
Start: 2022-02-28 | End: 2022-02-28 | Stop reason: HOSPADM

## 2022-02-28 RX ORDER — CIPROFLOXACIN 2 MG/ML
400 INJECTION, SOLUTION INTRAVENOUS
Status: COMPLETED | OUTPATIENT
Start: 2022-02-28 | End: 2022-02-28

## 2022-02-28 RX ORDER — LIDOCAINE HYDROCHLORIDE 20 MG/ML
INJECTION, SOLUTION INFILTRATION; PERINEURAL PRN
Status: DISCONTINUED | OUTPATIENT
Start: 2022-02-28 | End: 2022-02-28 | Stop reason: SDUPTHER

## 2022-02-28 RX ORDER — SODIUM CHLORIDE 0.9 % (FLUSH) 0.9 %
10 SYRINGE (ML) INJECTION PRN
Status: DISCONTINUED | OUTPATIENT
Start: 2022-02-28 | End: 2022-02-28 | Stop reason: HOSPADM

## 2022-02-28 RX ORDER — PROPOFOL 10 MG/ML
INJECTION, EMULSION INTRAVENOUS PRN
Status: DISCONTINUED | OUTPATIENT
Start: 2022-02-28 | End: 2022-02-28 | Stop reason: SDUPTHER

## 2022-02-28 RX ORDER — MIDAZOLAM HYDROCHLORIDE 1 MG/ML
INJECTION INTRAMUSCULAR; INTRAVENOUS PRN
Status: DISCONTINUED | OUTPATIENT
Start: 2022-02-28 | End: 2022-02-28 | Stop reason: SDUPTHER

## 2022-02-28 RX ORDER — SODIUM CHLORIDE 0.9 % (FLUSH) 0.9 %
10 SYRINGE (ML) INJECTION EVERY 12 HOURS SCHEDULED
Status: DISCONTINUED | OUTPATIENT
Start: 2022-02-28 | End: 2022-02-28 | Stop reason: HOSPADM

## 2022-02-28 RX ORDER — DEXAMETHASONE SODIUM PHOSPHATE 4 MG/ML
INJECTION, SOLUTION INTRA-ARTICULAR; INTRALESIONAL; INTRAMUSCULAR; INTRAVENOUS; SOFT TISSUE PRN
Status: DISCONTINUED | OUTPATIENT
Start: 2022-02-28 | End: 2022-02-28 | Stop reason: SDUPTHER

## 2022-02-28 RX ORDER — MAGNESIUM HYDROXIDE 1200 MG/15ML
LIQUID ORAL CONTINUOUS PRN
Status: COMPLETED | OUTPATIENT
Start: 2022-02-28 | End: 2022-02-28

## 2022-02-28 RX ORDER — SODIUM CHLORIDE 9 MG/ML
25 INJECTION, SOLUTION INTRAVENOUS PRN
Status: DISCONTINUED | OUTPATIENT
Start: 2022-02-28 | End: 2022-02-28 | Stop reason: HOSPADM

## 2022-02-28 RX ORDER — SODIUM CHLORIDE, SODIUM LACTATE, POTASSIUM CHLORIDE, CALCIUM CHLORIDE 600; 310; 30; 20 MG/100ML; MG/100ML; MG/100ML; MG/100ML
INJECTION, SOLUTION INTRAVENOUS CONTINUOUS
Status: DISCONTINUED | OUTPATIENT
Start: 2022-02-28 | End: 2022-02-28 | Stop reason: HOSPADM

## 2022-02-28 RX ORDER — ONDANSETRON 2 MG/ML
INJECTION INTRAMUSCULAR; INTRAVENOUS PRN
Status: DISCONTINUED | OUTPATIENT
Start: 2022-02-28 | End: 2022-02-28 | Stop reason: SDUPTHER

## 2022-02-28 RX ADMIN — FENTANYL CITRATE 50 MCG: 50 INJECTION INTRAMUSCULAR; INTRAVENOUS at 07:37

## 2022-02-28 RX ADMIN — FENTANYL CITRATE 25 MCG: 50 INJECTION INTRAMUSCULAR; INTRAVENOUS at 07:49

## 2022-02-28 RX ADMIN — LIDOCAINE HYDROCHLORIDE 60 MG: 20 INJECTION, SOLUTION INFILTRATION; PERINEURAL at 07:37

## 2022-02-28 RX ADMIN — FENTANYL CITRATE 25 MCG: 50 INJECTION INTRAMUSCULAR; INTRAVENOUS at 08:00

## 2022-02-28 RX ADMIN — PROPOFOL 300 MG: 10 INJECTION, EMULSION INTRAVENOUS at 07:37

## 2022-02-28 RX ADMIN — CIPROFLOXACIN 400 MG: 2 INJECTION, SOLUTION INTRAVENOUS at 07:28

## 2022-02-28 RX ADMIN — MIDAZOLAM HYDROCHLORIDE 2 MG: 2 INJECTION, SOLUTION INTRAMUSCULAR; INTRAVENOUS at 07:31

## 2022-02-28 RX ADMIN — ONDANSETRON 4 MG: 2 INJECTION INTRAMUSCULAR; INTRAVENOUS at 07:37

## 2022-02-28 RX ADMIN — DEXAMETHASONE SODIUM PHOSPHATE 10 MG: 4 INJECTION, SOLUTION INTRAMUSCULAR; INTRAVENOUS at 07:37

## 2022-02-28 RX ADMIN — FAMOTIDINE 20 MG: 10 INJECTION, SOLUTION INTRAVENOUS at 06:21

## 2022-02-28 RX ADMIN — SODIUM CHLORIDE, SODIUM LACTATE, POTASSIUM CHLORIDE, AND CALCIUM CHLORIDE: .6; .31; .03; .02 INJECTION, SOLUTION INTRAVENOUS at 07:08

## 2022-02-28 ASSESSMENT — PULMONARY FUNCTION TESTS
PIF_VALUE: 0
PIF_VALUE: 1
PIF_VALUE: 0
PIF_VALUE: 1
PIF_VALUE: 0
PIF_VALUE: 0
PIF_VALUE: 1
PIF_VALUE: 0
PIF_VALUE: 0
PIF_VALUE: 1
PIF_VALUE: 0
PIF_VALUE: 1
PIF_VALUE: 0
PIF_VALUE: 1

## 2022-02-28 ASSESSMENT — PAIN - FUNCTIONAL ASSESSMENT: PAIN_FUNCTIONAL_ASSESSMENT: 0-10

## 2022-02-28 ASSESSMENT — ENCOUNTER SYMPTOMS: SHORTNESS OF BREATH: 0

## 2022-02-28 ASSESSMENT — LIFESTYLE VARIABLES: SMOKING_STATUS: 0

## 2022-02-28 ASSESSMENT — PAIN SCALES - GENERAL: PAINLEVEL_OUTOF10: 0

## 2022-02-28 NOTE — ANESTHESIA PRE PROCEDURE
Department of Anesthesiology  Preprocedure Note       Name:  Daphney Shirley   Age:  36 y.o.  :  1990                                          MRN:  [id number]         Date:  2022      Surgeon: Scarlet Mathias):  Aleisha Ramos DO    Procedure: Procedure(s): INFUSAPORT REMOVAL             **LATEX SENSITIVE**    Medications prior to admission:   Prior to Admission medications    Medication Sig Start Date End Date Taking? Authorizing Provider   Prenatal Multivit-Min-Fe-FA (PRE- PO) Take by mouth daily    Historical Provider, MD       Current medications:    Current Facility-Administered Medications   Medication Dose Route Frequency Provider Last Rate Last Admin    lactated ringers infusion   IntraVENous Continuous Tejinder Abebe MD        sodium chloride flush 0.9 % injection 10 mL  10 mL IntraVENous 2 times per day Tejinder Abebe MD        sodium chloride flush 0.9 % injection 10 mL  10 mL IntraVENous PRN Tejinder Abebe MD        0.9 % sodium chloride infusion  25 mL IntraVENous PRN Tejinder Abebe MD        famotidine (PEPCID) injection 20 mg  20 mg IntraVENous Once Tejinder Abebe MD        metronidazole (FLAGYL) 500 mg in NaCl 100 mL IVPB premix  500 mg IntraVENous [facility],         ciprofloxacin (CIPRO) IVPB 400 mg  400 mg IntraVENous [facility], DO           Allergies:     Allergies   Allergen Reactions    Latex Other (See Comments)     Redness and burning when exposed to latex    Pcn [Penicillins] Shortness Of Breath and Rash       Problem List:    Patient Active Problem List   Diagnosis Code    Right ovarian epithelial cancer ([facility] [address] C56.1    Pre-syncope R55    S/P  section R67.085    Postpartum care following  delivery Z39.2    Lactating mother Z39.1       Past Medical History:        Diagnosis Date    Anemia     Depression     Infertility, female     Menopausal symptoms     Right ovarian epithelial cancer ([facility] [address] 2019       Past Surgical History:        Procedure Laterality Date     SECTION N/A 2021     SECTION performed by Tova Sow DO at [id number] [place] [address] L&D OR    DENTAL SURGERY      impacted tooth    INSERTION / REMOVAL / REPLACEMENT VENOUS ACCESS CATHETER Left 2019    LEFT SUBCLAVIAN INFUSAPORT PLACEMENT performed by Chacorta Connolly DO at [facility] SALPINGO-OOPHORECTOMY Right 2019    EXPLOROTORY LAPAROTOMY, RIGHT SALPINGO OOPHORECTOMY,   PELVIC WASHINGS, BIOPSIES OF CECUM SEROSA. SMALL BOWEL SEROSA, OMENTUM performed by Santos Tim MD at [address] [place] [address] 289 Right 2019    INCORRECT PROCEDURE       Social History:    Social History     Tobacco Use    Smoking status: Never Smoker    Smokeless tobacco: Never Used   Substance Use Topics    Alcohol use: Not Currently     Comment: occ                                Counseling given: Not Answered      Vital Signs (Current):   Vitals:    22 1243 22 0607   BP:  107/74   Pulse:  84   Resp:  16   Temp:  97.4 °F (36.3 °C)   SpO2:  100%   Weight: 128 lb (58.1 kg) 130 lb (59 kg)   Height: 5' 2\" (1.575 m) 5' 2\" (1.575 m)                                              BP Readings from Last 3 Encounters:   22 107/74   22 102/68   21 122/76       NPO Status:  MN+, NO AM MEDS                                                                               BMI:   Wt Readings from Last 3 Encounters:   22 130 lb (59 kg)   22 131 lb 3.2 oz (59.5 kg)   21 147 lb (66.7 kg)     Body mass index is 23.78 kg/m².     CBC:   Lab Results   Component Value Date    WBC 12.4 2021    RBC 3.09 2021    HGB 8.4 2021    HCT 24.9 2021    MCV 80.6 2021    RDW 18.2 2021     2021       CMP:   Lab Results   Component Value Date     2019    K 4.0 2019     2019    CO2 26 2019    BUN 9 2019    CREATININE 0.5 07/25/2019    GFRAA >60 07/25/2019    LABGLOM >60 07/25/2019    GLUCOSE 85 07/25/2019    CALCIUM 9.2 07/25/2019       POC Tests: No results for input(s): POCGLU, POCNA, POCK, POCCL, POCBUN, POCHEMO, POCHCT in the last 72 hours. Coags: No results found for: PROTIME, INR, APTT    HCG (If Applicable):   Lab Results   Component Value Date    PREGTESTUR Negative 02/28/2022        ABGs: No results found for: PHART, PO2ART, FYH8VKN, FWE9HWE, BEART, L0MMAKPW     Type & Screen (If Applicable):  No results found for: LABABO, LABRH    Drug/Infectious Status (If Applicable):  No results found for: HIV, HEPCAB    COVID-19 Screening (If Applicable):   Lab Results   Component Value Date    COVID19 Not Detected 02/23/2022           Anesthesia Evaluation  Patient summary reviewed no history of anesthetic complications:   Airway: Mallampati: II  TM distance: >3 FB   Neck ROM: full  Mouth opening: > = 3 FB Dental:          Pulmonary:Negative Pulmonary ROS breath sounds clear to auscultation      (-) COPD, asthma, shortness of breath, recent URI, sleep apnea and not a current smoker          Patient did not smoke on day of surgery. Cardiovascular:Negative CV ROS        (-) pacemaker, hypertension, past MI, CAD, CABG/stent, dysrhythmias,  angina and  CHF    ECG reviewed  Rhythm: regular  Rate: normal           Beta Blocker:  Not on Beta Blocker         Neuro/Psych:   (+) depression/anxiety  (HX DEP.)   (-) seizures, TIA and CVA           GI/Hepatic/Renal:   (+) GERD (OTC MEDS PRN): well controlled,      (-) liver disease, no renal disease, bowel prep and no morbid obesity       Endo/Other:    (+) malignancy/cancer ([place]). (-) diabetes mellitus, hypothyroidism, hyperthyroidism, blood dyscrasia, arthritis               Abdominal:       Abdomen: soft. Vascular: negative vascular ROS. Other Findings: L CHEST PORT            Anesthesia Plan      TIVA     ASA 3       Induction: intravenous.     MIPS: Postoperative opioids intended and Prophylactic antiemetics administered. Anesthetic plan and risks discussed with patient. Plan discussed with CRNA. This pre-anesthesia assessment may be used as a history and physical.    DOS STAFF ADDENDUM:    Pt seen and examined, chart reviewed (including anesthesia, drug and allergy history). No interval changes to history and physical examination. Anesthetic plan, risks, benefits, alternatives, and personnel involved discussed with patient. Patient verbalized an understanding and agrees to proceed.       Michael Gutierrez MD  February 28, 2022  6:18 AM      Michael Gutierrez MD   2/28/2022

## 2022-02-28 NOTE — DISCHARGE INSTR - DIET

## 2022-02-28 NOTE — H&P
GYNECOLOGIC ONCOLOGY NOTE    Patient Name: Gabbi Goncalves  Patient : 1990  Patient MRN: [id number]    Primary Oncologist: Dayan Schwab (Gynecological/Oncology)  GYN Oncologist: Dayan Schwab DO  Referring Physician: Moni Adams [facility]  OBGYN Physician: Huma Soliman MD; Lyndsay Agustin MD  Primary Care:    Date of Service: 2022      Chief Complaint  Dysgerminoma of ovary ( Stage Date: Unknown, Stage FIGO IC1  Histopathologic Type: Dysgerminoma, germ cell; BRCA1: Negative (mutation not present); BRCA2: Negative (mutation not present); BRCA Performing Lab: [facility]; )    Primary Diagnosis:  1. 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 Caris Molecular profile: MSI stable. ER and ND negative. BRCA 1 and 2 negative. PD-L1 negative. NTRK1-3 negative. Primary Therapy:  1. 19: Trenton Bazan, bx of serosa of the ileum and cecum, partial infracolic omentectomy, pelvic washings - by Dr. Zahra Crandall. (final path c/w right ovarian dysgerminoma). 2. 19: left SCV port placement    3. 19:  C1D1 of BEP     19: C1D9 of B held for N/V/D     19: febrile neutropenia. Given levaquin and neupogen x 5 days     19: C1D16 of B held for thrombocytopenia      -19: C2D1 to C2D5 of BEP (dose reduced by 20%)     9/3/19: C2D9 bleomycin (dose reduced by 20%)     9/10/19: C2D16 bleomycin (dose reduced by 20%)     - C3D1-5 of BEP (dose reduced by 20%)     19: C3D9 of bleomycin (dose reduced by 20%)     10/1/19:  C3D16 of bleomycin (dose reduced by 20%)    4. 10/23/19: CT C/A/P: TYLER  5. 20: CXR: negative  6. 20: CT A/P: TYLER  7. 2020: CT A/P: TYLER    GENTETIC TESTIN. 2021: Piedad James BRCA1/2 Analyses with CancerNext panel  NEGATIVE: THIS TEST DID NOT IDENTIFY ANY PATHOGENIC VARIANTS KNOWN TO CAUSE DISEASE.     Problem List  Dysgerminoma of ovary ( Stage Date: Unknown, Stage FIGO IC1  Histopathologic Type: Dysgerminoma, germ cell; BRCA1: Negative (mutation not present); BRCA2: Negative (mutation not present); BRCA Performing Lab: [facility]; )  Abdominal distention  Abdominal pain  Dehydration  Drug administration observations (finding)  Drug induced pancytopenia  Encounter for antineoplastic chemotherapy  Evaluation finding (finding)  Genetic counseling  Intrauterine pregnancy (finding)  Nausea (finding)  Nausea and vomiting (disorder)  Premature menopause  Tooth abscess    OB/GYN History  Menses Details: Age of First Menses: 14; Last Period: 2019; ; Pregnancy Details: Currently Pregnant: No; Desire for Fertility: No; : 0; Para: 0; ; Menopause Information: Premenopausal; ; OB/GYN Medication Hx: IUD: No; Other Contraceptive Hx: No;    HPI  Ava Munoz is a pleasant 33 yo female, G0, LMP: 19, who presents in consultation from Dr. Ashley Marinelli for evaluation and management of a right ovarian dysgerminoma obtained on final path after she underwent surgical resection for an enlarged right ovarian mass. Recent hx is as follows:    19: pelvic US done for RLQ abdominal pain. Findings demonstrated an enlarged 11cm pelvic mass. MRI then ordered. 19: MRI pelvis done. C/w enlarged right ovarian complex solid/cystic mass with moderate free fluid in the pelvis. 19: Taken to the OR by Dr. Diamond Mckeon and she underwent an Xlap, RSO, bx of serosa of the ileum and cecum, partial infracolic omentectomy, pelvic washings via pfannenstiel incision. Intraop findings c/w a necrotic enlarged right ovarian mass. \"On entering the peritoneal cavity, a large amount of clear fluid was noted. ... attempts were made to encircle the mass digitally. In that process, the mass ruptured. No fluid were seen just carcinomatous appearing tissue from the ovary was noted. \"     She was then referred to us for further eval and management. Ava Munoz presents today with her mother-in-law who is an  at [facility] [employer]. Ava Munoz is recovering well from her surgery. denies pain.  Just finished her menses. No N/V. No fever or chills. She has never been pregnant, however, would like to maintain fertility options. She denies any med hx. No cardiac or pulmonary events. No asthma. Never on anti-coagulation.       Medications & Allergies  Medications:  Prenatal Multivit-Ca-Min-Fe-FA Oral oral one daily      Allergies:  Current Allergy List  Allergy Name Severity Status Recording Date  Penicillins Moderate to severe Active 02/01/2022    Previous Therapies  Dysgerminoma of ovary  Bleomycin D2,9,16 + Etoposide D1-5 + Cisplatin D1-5 (BEP) Q21D[Assoc Dx: Dysgerminoma of ovary LOT: Adjuvant Stage: FIGO IC1 Treatment Intent: Curative]  Medication Start End  Bleomycin IV 30 unit 08/06/2019 10/01/2019  Bleomycin IV 29 unit 08/06/2019 08/06/2019  Etoposide  mg (100 mg/m2) 08/05/2019 09/20/2019  Cisplatin IV 31 mg (20 mg/m2) 08/05/2019 09/20/2019  Potassium Chloride IV 10 mEq 08/05/2019 09/20/2019  Magnesium Sulfate IV 1 gram 08/05/2019 09/20/2019  Sodium Chloride IV 0.9 % 500 mL 09/20/2019 09/20/2019  Sodium Chloride IV 0.9 % 500 mL 08/05/2019 09/20/2019  Sodium Chloride IV 0.9 % 500 mL 09/19/2019 09/19/2019  Palonosetron IV 0.25 mg 08/05/2019 09/19/2019  Phenergan (Promethazine IV) 6.25 mg 09/20/2019 09/20/2019  Phenergan (Promethazine IV) 12.5 mg 09/18/2019 09/18/2019  Ondansetron IV 8 mg 09/18/2019 09/18/2019  Ondansetron IV 8 mg 08/28/2019 08/28/2019  Dexamethasone IV 10 mg 08/05/2019 09/20/2019  Aprepitant  mg 08/05/2019 09/16/2019  Emend (Aprepitant Oral) 80 mg qday 08/06/2019 09/17/2019  Acetaminophen Oral 650 mg 08/06/2019 10/01/2019  Activase (Alteplase IV) 2 mg 09/03/2019 09/03/2019  Diphenhydramine Oral 25 mg 08/06/2019 10/01/2019  Olanzapine Oral 2.5 mg 08/05/2019 09/16/2019OHC Hydration[Assoc Dx: Dysgerminoma of ovary LOT: Toxicity Management Stage: FIGO IC1 Treatment Intent: Curative]  Medication Start End  Dexamethasone IV 10 mg 08/13/2019 09/24/2019  Zofran (Ondansetron IV) 4 mg 08/13/2019 09/24/2019  Sodium Chloride IV 0.9 % 0.9 % solution 1000 mL 08/13/2019 09/24/2019OHC Hydration[Assoc Dx: Dysgerminoma of ovary LOT: Toxicity Management Treatment Intent: Curative]  Medication Start End  Sodium Chloride IV 0.9 % 0.9 % solution 1000 mL 08/16/2019 08/16/2019Filgrastim D1-5 Q7D[Assoc Dx: Dysgerminoma of ovary LOT: Toxicity Management ]  Medication Start End  Zarxio (Filgrastim-Sndz Subcutaneous) 300 mcg 08/16/2019 08/19/2019OHC Hydration[Assoc Dx: Dysgerminoma of ovary LOT: Toxicity Management ]  Medication Start End  Sodium Chloride IV 0.9 % 500 mL 08/19/2019 08/19/2019Filgrastim D1-5 Q7D[Assoc Dx: Dysgerminoma of ovary LOT: Toxicity Management ]  Medication Start End  Zarxio (Filgrastim-Sndz Subcutaneous) 300 mcg 08/20/2019 08/20/2019OHC Hydration[Assoc Dx: Dysgerminoma of ovary LOT: Other ]  Medication Start End  Zofran (Ondansetron IV) 8 mg 08/30/2019 09/03/2019  Sodium Chloride IV 0.9 % 1000 mL 08/30/2019 09/03/2019Filgrastim D1-5 Q7D[Assoc Dx: Dysgerminoma of ovary LOT: Toxicity Management Treatment Intent: Curative]  Medication Start End  Zarxio (Filgrastim-Sndz Subcutaneous) 300 mcg 09/04/2019 09/08/2019OHC Hydration[Assoc Dx: Dysgerminoma of ovary LOT: Toxicity Management ]  Medication Start End  Zofran (Ondansetron IV) 4 mg 09/03/2019 09/03/2019  Sodium Chloride IV 0.9 % 0.9 % solution 1000 mL 09/03/2019 09/03/2019Filgrastim D1-5 Q7D[Assoc Dx: Dysgerminoma of ovary LOT: Toxicity Management ]  Medication Start End  Zarxio (Filgrastim-Sndz Subcutaneous) 300 mcg 09/25/2019 09/29/2019OHC Hydration[Assoc Dx: Dysgerminoma of ovary LOT: Toxicity Management ]  Medication Start End  Sodium Chloride IV 0.9 % 0.9 % solution 1000 mL 10/01/2019 10/01/2019       Current Therapy  Bleomycin D2,9,16 + Etoposide D1-5 + Cisplatin D1-5 (BEP) Q21D Cycle Length: 21 Number Cycles: 3 Start: Allie Lucia on 08/05/2019 Assoc Dx: Dysgerminoma of ovary LOT: Adjuvant Stage: FIGO IC1 Treatment Intent: [id number]/05/2019 C2 D3  Bleomycin IV,  unit  Bleomycin IV,  unit, Dose modified  Etoposide IV,  mg, Dose modified  Cisplatin IV,  mg, Dose modified  Potassium Chloride IV, G: 10 mEq, Dose modified  Magnesium Sulfate IV,  gram  Sodium Chloride IV 0.9 %,  mL, Dose modified  Sodium Chloride IV 0.9 %,  mL, Dose modified  Sodium Chloride IV 0.9 %,  mL, Dose modified  OHC Hydration Cycle Length: 1 Number Cycles: 3 Start: C1D1 on 2019 Assoc Dx: Dysgerminoma of ovary LOT: Toxicity Management Stage: FIGO IC1 Treatment Intent: [id number]/13/2019 C2 D1  Sodium Chloride IV 0.9 %, .9 % 1000 mL, Dose modified  OHC Hydration Cycle Length: 1 Number Cycles: 1 Start: Candacee Fruit on 2019 Assoc Dx: Dysgerminoma of ovary LOT: Toxicity Management Treatment Intent: Orjnmfwy21/16/2019 C1 D1  Sodium Chloride IV 0.9 %, .9 % 1000 mL, Dose modified  Filgrastim D1-5 Q7D Cycle Length: 4 Number Cycles: 1 Start: Truddie Fruit on 2019 Assoc Dx: Dysgerminoma of ovary LOT: Toxicity Management 2019 C1 D3  Zarxio (Filgrastim-Sndz Subcutaneous),  mcg  OHC Hydration Cycle Length: 1 Number Cycles: 1 Start: Trlouisedie Fruit on 2019 Assoc Dx: Dysgerminoma of ovary LOT: Toxicity Management 2019 C1 D1  Sodium Chloride IV 0.9 %,  mL  Filgrastim D1-5 Q7D Cycle Length: 1 Number Cycles: 1 Start: Truddie Fruit on 2019 Assoc Dx: Dysgerminoma of ovary LOT: Toxicity Management 2019 C1 D1  Zarxio (Filgrastim-Sndz Subcutaneous),  mcg  OHC Hydration Cycle Length: 1 Number Cycles: 2 Start: Truddie Fruit on 2019 Assoc Dx: Dysgerminoma of ovary LOT: Other 2019 C1 D1  Sodium Chloride IV 0.9 %,  mL, Dose modified  Filgrastim D1-5 Q7D Cycle Length: 5 Number Cycles: 1 Start: C1D1 on 2019 Assoc Dx: Dysgerminoma of ovary LOT: Toxicity Management Treatment Intent: [id number]/04/2019 C1 D4  Zarxio (Filgrastim-Sndz Subcutaneous),  mcg  OHC Hydration Cycle Length: 1 Number Cycles: 1 Start: Rhoda Hutchison on 2019 Assoc Dx: Dysgerminoma of ovary LOT: Toxicity Management 2019 C1 D1  Sodium Chloride IV 0.9 %, HELD: 0.9 % solution 1000 mL  Filgrastim D1-5 Q7D Cycle Length: 5 Number Cycles: 1 Start: Rhoda Foster on 2019 Assoc Dx: Dysgerminoma of ovary LOT: Toxicity Management 2019 C1 D5  Zarxio (Filgrastim-Sndz Subcutaneous), 300 mcg  OHC Hydration Cycle Length: 1 Number Cycles: 1 Start: Felisaelia Hutchison on 10/01/2019 Assoc Dx: Dysgerminoma of ovary LOT: Toxicity Management 10/01/2019 C1 D1  Sodium Chloride IV 0.9 %, .9 % 1000 mL, Dose modified    Interval history:  Patient was seen and examined in preop. Here for port removal. No changes in health since last visit. Doing well. No new questions/concerns. Desires to proceed as discussed      She continues to do well. Denies any abdominal pain, nausea or vomiting. No abnormal bleeding. She is considering having a second baby and try to conceive in May. She is still presently breast-feeding    Review of Systems    Constitutional:  No weight loss, No fever, No chills, No night sweats. Energy level good.   Eyes:  No impairment or change in vision  ENT / Mouth:  No pain, abnormal ulceration, bleeding, nasal drip or change in voice or hearing  Cardiovascular:  No chest pain, palpitations, new edema, or calf discomfort  Respiratory:  No pain, hemoptysis, change to breathing  Breast:  No pain, discharge, change in appearance or texture  Gastrointestinal:  No pain, cramping, jaundice, change to eating and bowel habits  Urinary:  No pain, bleeding or change in continence  Genitalia: No pain, bleeding or discharge  Musculoskeletal:  No redness, pain, edema or weakness  Skin:  No pruritus, rash, change to nodules or lesions  Neurologic:  No discomfort, change in mental status, speech, sensory or motor activity  Psychiatric:  No change in concentration or change to affect or mood  Endocrine:  No hot flashes, increased thirst, or change to urine production  Hematologic: No petechiae, ecchymosis or bleeding  Lymphatic:  No lymphadenopathy or lymphedema  Allergy / Immunologic:  No eczema, hives, frequent or recurrent infections      Vital Signs  /74   Pulse 84   Temp 97.4 °F (36.3 °C)   Resp 16   Ht 5' 2\" (1.575 m)   Wt 130 lb (59 kg)   LMP 02/15/2022   SpO2 100%   BMI 23.78 kg/m²       Physical Exam    CONSTITUTIONAL: awake, alert, cooperative, no apparent distress   EYES: pupils equal, round and reactive to light, sclera clear and conjunctiva normal  ENT: Normocephalic, without obvious abnormality, atraumatic  NECK: supple, symmetrical  HEMATOLOGIC/LYMPHATIC: no cervical, supraclavicular or inguinal lymphadenopathy   LUNGS: no increased work of breathing and clear to auscultation   CARDIOVASCULAR: regular rate and rhythm, normal S1 and S2, no murmur noted  ABDOMEN: soft, non-distended, non-tender, no masses palpated, no hepatosplenomegaly   MUSCULOSKELETAL: full range of motion noted, tone is normal  NEUROLOGIC: awake, alert, oriented to name, place and time. Motor skills grossly intact. SKIN: Normal skin color, texture, turgor and no jaundice. appears intact   EXTREMITIES: Without cyanosis, clubbing, edema or asymmetry   BREAST: deferred  GYNECOLOGIC: Sterile speculum exam and bimanual exam performed. Normal-appearing Cervix, vaginal mucosa and vulva. No abnormal bleeding or discharge. RECTAL: No rectal masses. Smooth cul-de-sac. No rectovaginal septal nodularity.     Labs  CBC  Lab Results 11/30/2021 04/19/2021 09/01/2020 06/09/2020 01/28/2020 10/29/2019    CBC                 WBC x 10^3/uL 4.6     4.8 5.0 2.8 (L)      RBC x 10^6/uL 4.42     4.50 4.63 4.00      HGB g/dL 12.9     14.0 13.7 12.2      HCT % 37.6     39.2 39.0 35.7      MCV fL 85.1     87.1 84.2 89.3      MCH pg 29.2     31.1 29.6 30.5      MCHC g/dL 34.3     35.7 (H) 35.1 34.2      RDW-CV, % 13.1     12.9 13.9 15.2 (H)      PLT x 10^3/uL 210.0     182.0 171.0 (L) 164.0 (L)      Alissa % 60.6     65.3 67.8 49.8      LY % 24.7     21.4 18.9 (L) 31.3      MO % 10.8     10.7 11.5 15.3 (H)      EO % 3.0     1.3 1.2 2.5      BA % 0.9     1.3 (H) 0.6 1.1      Alissa # (ANC) x 10^3/uL 2.79     3.12 3.37 1.37 (L)      LY # x 10^3/uL 1.14 (L)     1.02 (L) 0.94 (L) 0.86 (L)      MO # x 10^3/uL 0.50     0.51 0.57 0.42      EO # x 10^3/uL 0.14     0.06 0.06 0.07      BA # x 10^3/uL 0.04     0.06 0.03 0.03  CMP  Lab Results 11/30/2021 04/19/2021 09/01/2020 06/09/2020 01/28/2020 10/29/2019    Chemistries                 Glucose mg/dL       82 90 93      BUN mg/dL       8 15 11      Creatinine mg/dL       0.53 0.62 0.8      BUN/Creatinine ratio       15.1 24.2       Sodium mmol/L       135 (L) 138 143      Potassium mmol/L       4.0 4.0 4.1      Chloride mmol/L       105 103 107      CO2 mmol/L       25 27 29      Anion gap, mmol/L       5.0 8.0       Calcium mg/dL       8.8 9.1 9.9      Albumin g/dL       4.2 4.0 4.2      Total protein g/dL       7.3 7.2 7.1      A/G ratio       1.4 1.3       Bilirubin, total mg/dL       0.6 0.7 1.1      Alkaline phosphatase U/L       60 55 66      AST/SGOT U/L       25 25 44 (H)      ALT/SGPT U/L       12 13 61 (H)      LDH U/L 155     382 381 200 (H)      GFR non-African American, estimated mL/min/1.73m2       >60.0 >60.0 >60.0      GFR African American, estimated mL/min/1.73m2       >60.0 >60.0 >60.0  Lab Results 11/30/2021 04/19/2021 09/01/2020 06/09/2020 01/28/2020 10/29/2019    Tumor Markers                 AFP ng/mL 3.8       3.1 4.3      Beta HCG, serum, quant mIU/mL <5.0       <5.0 <5.0       U/mL 8.2     7.9 8.7 8.1                   Health Maintenance           Imaging  6/24/19: pelvic US: Uterus: Measures approximately 8.6 x 4 x 2.5 cm. ES 9mm. RO not seen. LO not seen. Predominantly centered within the right adnexa is an irregular heterogeneous mass which demonstrates internal vascularity. This measures up to 11 cm in greatest dimension.  This mass

## 2022-02-28 NOTE — OP NOTE
GYNECOLOGIC ONCOLOGY NOTE    Operative Note      Patient: Evelyn Maldonado  YOB: 1990  MRN: [id number]    Date of Procedure: 2/28/2022    Pre-Op Diagnosis: RIGHT OVARIAN CANCER    Post-Op Diagnosis: Same       Procedure(s): INFUSAPORT REMOVAL             **LATEX SENSITIVE**    Surgeon(s):  Guy Jacques DO    Assistant:   Surgical Assistant: Noemi Hennessy    Anesthesia: Monitor Anesthesia Care    Estimated Blood Loss (mL): Minimal    Complications: None    Specimens:   * No specimens in log *    Implants:  * No implants in log *      Drains: * No LDAs found *    Findings:   8 [employer] [place] PowerPort previously placed through the left subclavian vein. Port removed intact. No fraying of the catheter. Detailed Description of Procedure:   After assuring informed consent the patient was taken back to the operating suite and induction of anesthesia was done. The patient was placed in supine position and prepped and draped in the normal sterile fashion. Half percent Marcaine with epi was injected subcutaneously over the prior left chest wall port site. A 15 blade scalpel was used to excise the old incision. Bovie cautery was used to carry the incision down to the infusaport. The prolene stitches were removed and the port was removed intact with the catheter. Catheter was examined. No fraying of the catheter noted. Entire catheter removed intact. The port pocket was irrigated with normal saline. Good hemostasis was obtained. 3-0 vicryl was used to close the deep layer. 4-0 vicryl was used to close the subcuticular layer. Skin glue was then applied. The patient tolerated the procedure well. Sponge, lap, needle and instrument counts were correct x 2. Patient taken to the recovery room in stable condition.       Electronically signed by Guy Jacques DO on 2/28/2022 at 8:01 AM

## 2022-02-28 NOTE — ANESTHESIA POSTPROCEDURE EVALUATION
Department of Anesthesiology  Postprocedure Note    Patient: Josiah Duffy  MRN: [id number]  YOB: 1990  Date of evaluation: 2/28/2022  Time:  9:03 AM     Procedure Summary     Date: 02/28/22 Room / Location: [address] [place] [address]    Anesthesia Start: 7646 Anesthesia Stop: Anil Bear    Procedure: INFUSAPORT REMOVAL             **LATEX SENSITIVE** (N/A Chest) Diagnosis:       Ovarian cancer on right [facility])      (RIGHT OVARIAN CANCER)    Surgeons: Bib Albarran DO Responsible Provider: Michael Gutierrez MD    Anesthesia Type: TIVA ASA Status: 3          Anesthesia Type: TIVA    Rosa Phase I: Rosa Score: 10    Rosa Phase II: Rosa Score: 10    Last vitals: Reviewed and per EMR flowsheets.    Vitals:    02/23/22 1243 02/28/22 0607 02/28/22 0814   BP:  107/74 104/61   Pulse:  84 98   Resp:  16 16   Temp:  97.4 °F (36.3 °C) 97.5 °F (36.4 °C)   TempSrc:   Temporal   SpO2:  100% 98%   Weight: 128 lb (58.1 kg) 130 lb (59 kg)    Height: 5' 2\" (1.575 m) 5' 2\" (1.575 m)        Anesthesia Post Evaluation    Patient location during evaluation: bedside  Patient participation: complete - patient participated  Level of consciousness: awake and alert  Airway patency: patent  Nausea & Vomiting: no nausea  Complications: no  Cardiovascular status: hemodynamically stable  Respiratory status: acceptable  Hydration status: euvolemic

## 2022-02-28 NOTE — TELEPHONE ENCOUNTER
Pt states she just had her port removed this am and was given antibiotics. She is concerned with developing yeast and is asking for medication. Pended. Please advise.

## 2022-02-28 NOTE — FLOWSHEET NOTE
02/28/22 0814   Vital Signs   Temp 97.5 °F (36.4 °C)   Temp Source Temporal   Pulse 98   Heart Rate Source Monitor   Resp 16   /61   BP Location Left upper arm   Level of Consciousness Alert (0)   MEWS Score 1   Patient Currently in Pain Denies   Pain Assessment   Pain Assessment 0-10   Pain Level 0   Oxygen Therapy   SpO2 98 %   O2 Device None (Room air)   Patient returned from [facility], alert.

## 2022-12-12 ENCOUNTER — OFFICE VISIT (OUTPATIENT)
Dept: OBGYN CLINIC | Age: 32
End: 2022-12-12

## 2022-12-12 VITALS
TEMPERATURE: 98 F | DIASTOLIC BLOOD PRESSURE: 80 MMHG | HEART RATE: 86 BPM | SYSTOLIC BLOOD PRESSURE: 100 MMHG | BODY MASS INDEX: 23.52 KG/M2 | WEIGHT: 128.6 LBS

## 2022-12-12 DIAGNOSIS — Z11.3 SCREENING EXAMINATION FOR STD (SEXUALLY TRANSMITTED DISEASE): ICD-10-CM

## 2022-12-12 DIAGNOSIS — N91.2 AMENORRHEA: ICD-10-CM

## 2022-12-12 DIAGNOSIS — Z12.4 PAP SMEAR FOR CERVICAL CANCER SCREENING: ICD-10-CM

## 2022-12-12 DIAGNOSIS — Z98.891 HISTORY OF CESAREAN SECTION: ICD-10-CM

## 2022-12-12 DIAGNOSIS — Z67.91 BLOOD TYPE, RH NEGATIVE: ICD-10-CM

## 2022-12-12 DIAGNOSIS — Z01.419 WOMEN'S ANNUAL ROUTINE GYNECOLOGICAL EXAMINATION: Primary | ICD-10-CM

## 2022-12-12 DIAGNOSIS — C56.1 RIGHT OVARIAN EPITHELIAL CANCER (HCC): ICD-10-CM

## 2022-12-13 LAB
CANDIDA SPECIES, DNA PROBE: NORMAL
GARDNERELLA VAGINALIS, DNA PROBE: NORMAL
TRICHOMONAS VAGINALIS DNA: NORMAL

## 2022-12-14 LAB
C TRACH DNA GENITAL QL NAA+PROBE: NEGATIVE
N. GONORRHOEAE DNA: NEGATIVE

## 2022-12-15 LAB
HPV COMMENT: NORMAL
HPV TYPE 16: NOT DETECTED
HPV TYPE 18: NOT DETECTED
HPVOH (OTHER TYPES): NOT DETECTED

## 2022-12-30 PROBLEM — Z67.91 BLOOD TYPE, RH NEGATIVE: Status: ACTIVE | Noted: 2022-12-30

## 2022-12-30 ASSESSMENT — ENCOUNTER SYMPTOMS
SHORTNESS OF BREATH: 0
CONSTIPATION: 0
ABDOMINAL DISTENTION: 0
DIARRHEA: 0
VOMITING: 1
NAUSEA: 1
ABDOMINAL PAIN: 0

## 2022-12-30 NOTE — PROGRESS NOTES
Subjective:      Patient ID: Urbano Guerra is a 29 y.o. female. HPI  30 y/o  female presents for well woman examination and evaluation secondary to amenorrhea/missed menses. Patient is established with Dr. Andrzej Huffman. Last pap smear was 2020--normal.  Menses have been irregular/strange. FDLMP: 22. Denies vaginal bleeding and normal discharge since September. Admits to nausea and vomiting every other day. Has gained 3 lbs. Patient has a history significant for a primary low transverse  for failure to progress at 40 weeks. Patient is Rh negative. History is positive as well for epithelial ovarian cancer. Has been cleared by Maynor Goldman. Sees Dr. Aggie Quiñones whose recommendations included: wait 6 months, obtain CT and blood work at that time, if clear, ok to conceive. Ongoing annual CT scans have been recommended. Last CT was normal in     Review of Systems   Constitutional:  Negative for activity change, appetite change, chills, fatigue, fever and unexpected weight change. Respiratory:  Negative for shortness of breath. Cardiovascular:  Negative for chest pain. Gastrointestinal:  Positive for nausea and vomiting. Negative for abdominal distention, abdominal pain, constipation and diarrhea. Endocrine: Negative for cold intolerance and heat intolerance. Genitourinary:  Positive for menstrual problem. Negative for difficulty urinating, dyspareunia, dysuria, frequency, genital sores, hematuria, pelvic pain, vaginal bleeding, vaginal discharge and vaginal pain. Skin:  Negative for rash. Neurological:  Positive for light-headedness (sits while in the shower). Negative for headaches. Hematological:  Does not bruise/bleed easily. Objective:   Physical Exam  Vitals and nursing note reviewed. Exam conducted with a chaperone present. Constitutional:       General: She is not in acute distress. Appearance: Normal appearance. She is well-developed.  She is not ill-appearing or toxic-appearing. HENT:      Head: Normocephalic and atraumatic. Neck:      Thyroid: No thyromegaly. Trachea: Trachea normal.   Cardiovascular:      Rate and Rhythm: Normal rate and regular rhythm. Heart sounds: Normal heart sounds, S1 normal and S2 normal.   Pulmonary:      Effort: Pulmonary effort is normal. No respiratory distress. Breath sounds: Normal breath sounds. Chest:   Breasts:     Breasts are symmetrical.      Right: No inverted nipple, mass, nipple discharge, skin change or tenderness. Left: No inverted nipple, mass, nipple discharge, skin change or tenderness. Abdominal:      General: Bowel sounds are normal. There is no distension. Palpations: Abdomen is soft. Abdomen is not rigid. There is no mass. Tenderness: There is no abdominal tenderness. There is no guarding or rebound. Genitourinary:     General: Normal vulva. Exam position: Lithotomy position. Labia:         Right: No rash, tenderness, lesion or injury. Left: No rash, tenderness, lesion or injury. Vagina: No signs of injury. No vaginal discharge, erythema, tenderness or bleeding. Cervix: No cervical motion tenderness, discharge or friability. Uterus: Not tender. Adnexa:         Right: No mass, tenderness or fullness. Left: No mass, tenderness or fullness. Musculoskeletal:         General: Normal range of motion. Cervical back: Neck supple. Skin:     General: Skin is warm and dry. Findings: No erythema or rash. Nails: There is no clubbing. Neurological:      Mental Status: She is alert and oriented to person, place, and time. Psychiatric:         Mood and Affect: Mood normal.         Speech: Speech normal.         Behavior: Behavior normal. Behavior is cooperative. Thought Content:  Thought content normal.         Judgment: Judgment normal.     OBSTETRIC ULTRASOUND--1ST TRIMESTER     DATE: 12/12/2022 PHYSICIAN: LORAINE Bro D.O.      SONOGRAPHER: Ronald Fitzgerald RDMS     INDICATION: Amenorrhea     TYPE OF SCAN:  abdominal     FINDINGS:       The cul de sac is normal.  The cervix is normal.  The uterus is gravid. The uterus measures 9.76 cm x 9.34 cm x 9.24 cm. No uterine anomalies are evident. The right ovary is not visualized. The left ovary is present. The left ovary measures 3.98 cm x 2.92 cm x 3.35 cm. There is a single intrauterine pregnancy identified. A fetal pole is noted with a CRL measuring 4.64 cm, consistent with gestational age of 9weeks and 3days and EDC of 2023. There is a 2 day discrepancy when compared with the gestational age of 11weeks and 2days and EDC of 2023 set by FDP (2022). Yolk sac is present and measures . 81 cm. Fetal cardiac activity is present at 164 bpm.      IMPRESSION:    Single IUP with cardiac activity. Imaging is limited secondary to bowel gas. Patient is well aware of the limitations of ultrasound in the detection of anomalies. Assessment:       Diagnosis Orders   1. Women's annual routine gynecological examination  C.trachomatis N.gonorrhoeae DNA    VAGINAL PATHOGENS PROBE *A    PAP SMEAR      2. Pap smear for cervical cancer screening  PAP SMEAR      3. Screening examination for STD (sexually transmitted disease)  C.trachomatis N.gonorrhoeae DNA    VAGINAL PATHOGENS PROBE *A      4. Amenorrhea  C.trachomatis N.gonorrhoeae DNA    VAGINAL PATHOGENS PROBE *A    Culture, Urine    Urinalysis with Microscopic    POCT urine pregnancy      5. Right ovarian epithelial cancer ([address]        6. History of  section        7.  Blood type, Rh negative                Plan:      Orders Placed This Encounter   Procedures    C.trachomatis N.gonorrhoeae DNA    Culture, Urine    VAGINAL PATHOGENS PROBE *A    PAP SMEAR    Urinalysis with Microscopic    Human papillomavirus (HPV) DNA probe thin prep high risk    PAP SMEAR    POCT urine pregnancy     Continue prenatal vitamins  Options for prenatal testing reviewed: NT, quad screen, free fetal DNA. Risks and benefits of testing discussed. Follow up prn and 4 weeks for initial prenatal visit.            Jayde Bro DO

## 2023-01-03 ENCOUNTER — INITIAL PRENATAL (OUTPATIENT)
Dept: OBGYN CLINIC | Age: 33
End: 2023-01-03
Payer: COMMERCIAL

## 2023-01-03 VITALS
SYSTOLIC BLOOD PRESSURE: 106 MMHG | HEART RATE: 86 BPM | WEIGHT: 131.4 LBS | BODY MASS INDEX: 24.03 KG/M2 | DIASTOLIC BLOOD PRESSURE: 68 MMHG

## 2023-01-03 DIAGNOSIS — Z67.91 BLOOD TYPE, RH NEGATIVE: ICD-10-CM

## 2023-01-03 DIAGNOSIS — C56.1 RIGHT OVARIAN EPITHELIAL CANCER (HCC): ICD-10-CM

## 2023-01-03 DIAGNOSIS — Z34.82 PRENATAL CARE, SUBSEQUENT PREGNANCY IN SECOND TRIMESTER: Primary | ICD-10-CM

## 2023-01-03 DIAGNOSIS — Z98.891 HISTORY OF CESAREAN SECTION: ICD-10-CM

## 2023-01-03 LAB
BASOPHILS ABSOLUTE: 0 K/UL (ref 0–0.2)
BASOPHILS RELATIVE PERCENT: 0.3 %
EOSINOPHILS ABSOLUTE: 0.1 K/UL (ref 0–0.6)
EOSINOPHILS RELATIVE PERCENT: 0.9 %
HCT VFR BLD CALC: 34.9 % (ref 36–48)
HEMOGLOBIN: 12.2 G/DL (ref 12–16)
LYMPHOCYTES ABSOLUTE: 1.2 K/UL (ref 1–5.1)
LYMPHOCYTES RELATIVE PERCENT: 18.8 %
MCH RBC QN AUTO: 30.6 PG (ref 26–34)
MCHC RBC AUTO-ENTMCNC: 35 G/DL (ref 31–36)
MCV RBC AUTO: 87.5 FL (ref 80–100)
MONOCYTES ABSOLUTE: 0.5 K/UL (ref 0–1.3)
MONOCYTES RELATIVE PERCENT: 7.5 %
NEUTROPHILS ABSOLUTE: 4.7 K/UL (ref 1.7–7.7)
NEUTROPHILS RELATIVE PERCENT: 72.5 %
PDW BLD-RTO: 14.3 % (ref 12.4–15.4)
PLATELET # BLD: 194 K/UL (ref 135–450)
PMV BLD AUTO: 8.3 FL (ref 5–10.5)
RBC # BLD: 3.99 M/UL (ref 4–5.2)
WBC # BLD: 6.5 K/UL (ref 4–11)

## 2023-01-03 PROCEDURE — 0500F INITIAL PRENATAL CARE VISIT: CPT | Performed by: OBSTETRICS & GYNECOLOGY

## 2023-01-03 PROCEDURE — 36415 COLL VENOUS BLD VENIPUNCTURE: CPT | Performed by: OBSTETRICS & GYNECOLOGY

## 2023-01-03 RX ORDER — ACETAMINOPHEN 500 MG
500 TABLET ORAL EVERY 6 HOURS PRN
COMMUNITY

## 2023-01-03 RX ORDER — ONDANSETRON 4 MG/1
4 TABLET, ORALLY DISINTEGRATING ORAL EVERY 8 HOURS PRN
Qty: 20 TABLET | Refills: 1 | Status: SHIPPED | OUTPATIENT
Start: 2023-01-03

## 2023-01-04 LAB
ABO/RH: NORMAL
AMPHETAMINE SCREEN, URINE: NORMAL
ANTIBODY SCREEN: NORMAL
BARBITURATE SCREEN URINE: NORMAL
BENZODIAZEPINE SCREEN, URINE: NORMAL
BUPRENORPHINE URINE: NORMAL
CANNABINOID SCREEN URINE: NORMAL
COCAINE METABOLITE SCREEN URINE: NORMAL
FENTANYL SCREEN, URINE: NORMAL
HEPATITIS B SURFACE ANTIGEN INTERPRETATION: NORMAL
HIV AG/AB: NORMAL
HIV ANTIGEN: NORMAL
HIV-1 ANTIBODY: NORMAL
HIV-2 AB: NORMAL
Lab: NORMAL
METHADONE SCREEN, URINE: NORMAL
OPIATE SCREEN URINE: NORMAL
OXYCODONE URINE: NORMAL
PH UA: 6
PHENCYCLIDINE SCREEN URINE: NORMAL
RUBELLA ANTIBODY IGG: 95.9 IU/ML
TOTAL SYPHILLIS IGG/IGM: NORMAL
TSH REFLEX: 2.55 UIU/ML (ref 0.27–4.2)
VARICELLA-ZOSTER VIRUS AB, IGG: NORMAL

## 2023-01-05 ENCOUNTER — TELEPHONE (OUTPATIENT)
Dept: OBGYN CLINIC | Age: 33
End: 2023-01-05

## 2023-01-05 PROBLEM — Z34.82 PRENATAL CARE, SUBSEQUENT PREGNANCY IN SECOND TRIMESTER: Status: ACTIVE | Noted: 2023-01-05

## 2023-01-05 LAB
HEPATITIS C VIRUS AB BY CIA INDEX: 0.02 IV
HEPATITIS C VIRUS AB BY CIA INTERPRETATION: NEGATIVE

## 2023-01-05 NOTE — TELEPHONE ENCOUNTER
Called pt after speaking with on call doctor, told pt recommendation was to call her PCP about the hernia pain, pt stated she does not have a PCP. Pt stated she hasn't experienced the pain again since she first called the office.  Pt will call our office in the morning to follow up with us and let us know if it continued to happen and maybe schedule appt for tomorrow when she calls in the AM.

## 2023-01-05 NOTE — TELEPHONE ENCOUNTER
Pt called stating she is 15 weeks pregnant and having abdominal pain off and on from her pre-existing umbilical hernia, she states the pain radiates from the belly button. Pt states the pain comes and goes, currently pt is not in pain but when it starts up pain is at an 8 on pain scale. Pt states she is not experiencing any bleeding. I gave pt ED precautions for pain and will send this encounter to her OB  and the on call Doctor. Please advise.

## 2023-01-06 NOTE — TELEPHONE ENCOUNTER
Pt called office for follow up, pt states she has not had any more pain or discomfort since onset last night. I told pt to call back if anything changes and her pain returns.

## 2023-01-06 NOTE — PROGRESS NOTES
30 y/o  female at 14 weeks 3 days gestation with [facility] 23 presents for initial prenatal visit. Patient is established with Dr. Edi Fernandez. FDP: 22. Pregnancy is complicated by nausea, vomiting, Rh negative, previous low transverse  for failure to progress at 40 weeks, and history of epithelial ovarian cancer. Has been cleared by Paola Chiu. Sees Dr. Denis Ballard whose recommendations included: wait 6 months, obtain CT and blood work at that time, if clear, ok to conceive. Ongoing annual CT scans have been recommended. Last CT was normal in   Denies vaginal bleeding, loss of fluid, and regular contractions. Admits to fetal movement. Denies headache, vision change, and RUQ pain. Admits to baseline shortness of breath, nausea, vomiting, and frequent urination. Denies fever, chills, chest pain, diarrhea, constipation, dysuria and hematuria. Maternal Wellness Questionnaire reviewed--no concerns  Declines NIPT testing     Diagnosis Orders   1. Prenatal care, subsequent pregnancy in second trimester  Varicella Zoster Antibody, IgG    Drug Screen Multi Urine With Bup    HIV Screen    Hepatitis B Surface Antigen    TSH with Reflex    Type and Screen    CBC with Auto Differential    Syphilis Antibody Cascading Reflex    Rubella antibody, IgG    Hep C AB RLFX HCV PCR-A      2. Blood type, Rh negative        3. Right ovarian epithelial cancer ([address]        4.  History of  section          Continue zofran prn  Follow up prn and 4 weeks for prenatal visit

## 2023-02-13 ENCOUNTER — ROUTINE PRENATAL (OUTPATIENT)
Dept: OBGYN CLINIC | Age: 33
End: 2023-02-13

## 2023-02-13 VITALS
BODY MASS INDEX: 25.2 KG/M2 | HEART RATE: 97 BPM | DIASTOLIC BLOOD PRESSURE: 60 MMHG | SYSTOLIC BLOOD PRESSURE: 114 MMHG | WEIGHT: 137.8 LBS

## 2023-02-13 DIAGNOSIS — R79.89 ELEVATED TSH: ICD-10-CM

## 2023-02-13 DIAGNOSIS — R10.33 PERIUMBILICAL ABDOMINAL PAIN: ICD-10-CM

## 2023-02-13 DIAGNOSIS — Z34.82 PRENATAL CARE, SUBSEQUENT PREGNANCY IN SECOND TRIMESTER: Primary | ICD-10-CM

## 2023-02-13 DIAGNOSIS — O43.199 MARGINAL INSERTION OF UMBILICAL CORD AFFECTING MANAGEMENT OF MOTHER: ICD-10-CM

## 2023-02-13 DIAGNOSIS — Z67.91 BLOOD TYPE, RH NEGATIVE: ICD-10-CM

## 2023-02-13 DIAGNOSIS — Z98.891 HISTORY OF CESAREAN SECTION: ICD-10-CM

## 2023-02-13 DIAGNOSIS — C56.1 RIGHT OVARIAN EPITHELIAL CANCER (HCC): ICD-10-CM

## 2023-02-13 NOTE — PROGRESS NOTES
Temp-97.3F infrared  Maternal emotional well being screening form completed and reviewed with patient. Current score is 1. Patient given referral to [facility] ([phone]):  No

## 2023-02-14 LAB
T4 FREE: 1.1 NG/DL (ref 0.9–1.8)
TSH REFLEX: 3.23 UIU/ML (ref 0.27–4.2)

## 2023-02-16 PROBLEM — O43.199 MARGINAL INSERTION OF UMBILICAL CORD AFFECTING MANAGEMENT OF MOTHER: Status: ACTIVE | Noted: 2023-02-16

## 2023-02-16 NOTE — PROGRESS NOTES
28 y/o  female at 20 weeks 2 days gestation with [facility] 23 presents for prenatal visit and ultrasound  Patient is established with Dr. Marilee Mcconnell. FDP: 22. Pregnancy is complicated by nausea, vomiting, Rh negative, previous low transverse  for failure to progress at 40 weeks, and history of epithelial ovarian cancer. Has been cleared by Jerry Ivey. Sees Dr. Mackenzie Marmolejo whose recommendations included: wait 6 months, obtain CT and blood work at that time, if clear, ok to conceive. Ongoing annual CT scans have been recommended. Last CT was normal in   Denies vaginal bleeding, loss of fluid, and regular contractions. Admits to fetal movement. Has noted some pain at umbilical hernia site--has been using belly band  Denies headache, vision change, and RUQ pain. Admits to baseline shortness of breath, nausea, vomiting, and frequent urination. Nausea and vomiting is decreased--a few bad days. Denies fever, chills, chest pain, diarrhea, constipation, dysuria and hematuria. Maternal Wellness Questionnaire reviewed--no concerns  Declines NIPT testing    OBSTETRIC ULTRASOUND -- SECOND TRIMESTER     DATE:  23     PHYSICIAN: LORAINE DUARTE     SONOGRAPHER: SHA Cordova RDMS     INDICATION:  Second trimester, Anatomy screening     TYPE OF SCAN: vaginal, abdominal 3.5 MHz 5MHz     FINDINGS:       A single viable intrauterine pregnancy is noted in cephalic presentation. Cardiac and somatic activity are noted.      The following values were obtained:              Fetal heart rate                                               136 bpm              BPD                                                                 4.51cm                        23.4 %              Head Circumference                                       17.54cm                      30.9 %               Abdominal Circumference                              14.49cm                      28.5 %              Femur Length 3.22cm                        32.9 %              Humerus Length                                             3.11cm                        54.0 %              Cerebellum                                                      1.79cm                        3.7 %              Amniotic fluid DVP                                          3.64cm              EFW                                                                321g                26.3 percentile     Subjective amniotic fluid volume is normal. Based on sonographic criteria, the estimated fetal age is 19 weeks and 4 days with EDC of 7/6/23. There is a 5 day discordance with the established EDC of 7/1/23. The patient has a posterior placenta that is adequate distance in relation to the internal cervical os. The evaluation of the lower uterine segment and cervix reveals normal appearing anatomy. Transvaginal cervical length is 5.16cm with no funneling noted. The uterus is unremarkable/gravid.  Maternal ovaries and adnexae are not well visualized due to the size of the uterus and patient's gravid state.     Normal Anatomy Seen:  4 chamber heart                                  Spine                                       CSP  LVOT                                                   Kidneys                                   Lateral ventricles  RVOT                                                  Umbilical arteries                    Cerebellum  Ductal arch                                          Bladder                                    Cisterna magna  Aortic arch                                           Face                                        Nuchal fold  Diaphragm                                          Profile                                      Upper extremities  Stomach                                              Nose/lips                                 Lower extremities  ACI Choroid plexus     Suboptimal anatomy seen:     Abnormal anatomy seen: Marginal CI     The fetal genitalia is noted to be Male.      IMPRESSION:  Single living IUP. No gross structural abnormalities are visualized. Amniotic fluid volume is subjectively normal.  Marginal CI is seen on today's exam.      The patient is well aware of the limitations of ultrasound in the detection of fetal anomalies. The scan is limited by fetal position. Diagnosis Orders   1. Elevated TSH  TSH with Reflex    T4, Free      2. Prenatal care, subsequent pregnancy in second trimester        3. Blood type, Rh negative        4. Right ovarian epithelial cancer ([facility] [address]        5. History of  section        6. Marginal insertion of umbilical cord affecting management of mother          Orders Placed This Encounter   Procedures    TSH with Reflex    T4, Free     Growth scan every 4 weeks  Consider referral to general surgery. Follow up prn and 4 weeks for prenatal visit.

## 2023-03-13 ENCOUNTER — ROUTINE PRENATAL (OUTPATIENT)
Dept: OBGYN CLINIC | Age: 33
End: 2023-03-13

## 2023-03-13 VITALS
SYSTOLIC BLOOD PRESSURE: 110 MMHG | HEART RATE: 89 BPM | BODY MASS INDEX: 26.26 KG/M2 | TEMPERATURE: 98.1 F | DIASTOLIC BLOOD PRESSURE: 64 MMHG | WEIGHT: 143.6 LBS

## 2023-03-13 DIAGNOSIS — Z98.891 HISTORY OF CESAREAN SECTION: ICD-10-CM

## 2023-03-13 DIAGNOSIS — O43.199 MARGINAL INSERTION OF UMBILICAL CORD AFFECTING MANAGEMENT OF MOTHER: ICD-10-CM

## 2023-03-13 DIAGNOSIS — Z67.91 BLOOD TYPE, RH NEGATIVE: ICD-10-CM

## 2023-03-13 DIAGNOSIS — Z85.43 PERSONAL HISTORY OF OVARIAN CANCER: ICD-10-CM

## 2023-03-13 DIAGNOSIS — Z34.82 PRENATAL CARE, SUBSEQUENT PREGNANCY IN SECOND TRIMESTER: Primary | ICD-10-CM

## 2023-03-13 DIAGNOSIS — Z36.89 ENCOUNTER FOR ULTRASOUND TO CHECK FETAL GROWTH: ICD-10-CM

## 2023-03-13 DIAGNOSIS — O09.92 HIGH-RISK PREGNANCY IN SECOND TRIMESTER: ICD-10-CM

## 2023-03-13 DIAGNOSIS — C56.1 RIGHT OVARIAN EPITHELIAL CANCER (HCC): ICD-10-CM

## 2023-03-13 PROCEDURE — 0502F SUBSEQUENT PRENATAL CARE: CPT | Performed by: OBSTETRICS & GYNECOLOGY

## 2023-03-13 NOTE — PROGRESS NOTES
Maternal emotional well being screening form completed and reviewed with patient.  Current score is 10
5.07cm              EFW                                                                645g                26.7 percentile     Amniotic fluid volume is normal. Based on sonographic criteria the estimated fetal age is 23weeks and 6days with EDC of 2023. There is a 3 day discordance with the established EDC of 2023. The patient has a posterior placenta that is adequate distance in relation to the internal cervical os. The evaluation of the lower uterine segment and cervix reveals normal appearing anatomy. The uterus is unremarkable/gravid. Maternal ovaries and adnexae are not well visualized due to the size of the uterus and patient's gravid state. Anatomy seen includes: heart, stomach, kidneys, bladder     IMPRESSION:  Single live IUP in the second trimester. Adequate interval fetal growth. MCI has now resolved, 2.5cm from the placental edge. Imaging is limited secondary to fetal position. The patient is well aware of the limitations of ultrasound in the detection of anomalies. Assessment/Plan:   Diagnosis Orders   1. Prenatal care, subsequent pregnancy in second trimester        2. High-risk pregnancy in second trimester        3. Marginal insertion of umbilical cord affecting management of mother        4. Encounter for ultrasound to check fetal growth        5. Blood type, Rh negative        6. History of  section        7. Personal history of ovarian cancer        8. Right ovarian epithelial cancer ([facility] [address]           1. Prenatal Care, Subsequent pregnancy   - Reassuring maternal / fetal status   - Aneuploidy Screen: declines    - AFP: declines  - Anatomy: 23 -- normal male fetus, vertex. EFW 26.3%ile, Posterior placenta no previa, (+) MCI, CL 5.16cm no funnel.   - PNL: O-/ab-, RI, HIV neg, syphilis neg, HepB neg, HepC neg, Varicella Immune, TSH: 2.55, UDS neg.  Hgb 12.2, , GCCT/trich neg, Ucx no growth, PAP: 22 NILM / HPV (-)   -

## 2023-04-24 ENCOUNTER — ROUTINE PRENATAL (OUTPATIENT)
Dept: OBGYN CLINIC | Age: 33
End: 2023-04-24

## 2023-04-24 VITALS
WEIGHT: 149 LBS | BODY MASS INDEX: 27.25 KG/M2 | SYSTOLIC BLOOD PRESSURE: 110 MMHG | HEART RATE: 104 BPM | DIASTOLIC BLOOD PRESSURE: 70 MMHG

## 2023-04-24 DIAGNOSIS — O43.199 MARGINAL INSERTION OF UMBILICAL CORD AFFECTING MANAGEMENT OF MOTHER: ICD-10-CM

## 2023-04-24 DIAGNOSIS — O09.93 HIGH-RISK PREGNANCY IN THIRD TRIMESTER: ICD-10-CM

## 2023-04-24 DIAGNOSIS — C56.1 RIGHT OVARIAN EPITHELIAL CANCER (HCC): ICD-10-CM

## 2023-04-24 DIAGNOSIS — Z98.891 HISTORY OF CESAREAN SECTION: ICD-10-CM

## 2023-04-24 DIAGNOSIS — Z85.43 PERSONAL HISTORY OF OVARIAN CANCER: ICD-10-CM

## 2023-04-24 DIAGNOSIS — N89.8 VAGINAL DISCHARGE DURING PREGNANCY IN THIRD TRIMESTER: ICD-10-CM

## 2023-04-24 DIAGNOSIS — Z67.91 BLOOD TYPE, RH NEGATIVE: ICD-10-CM

## 2023-04-24 DIAGNOSIS — Z87.59 HX OF ONE MISCARRIAGE: ICD-10-CM

## 2023-04-24 DIAGNOSIS — Z34.83 PRENATAL CARE, SUBSEQUENT PREGNANCY IN THIRD TRIMESTER: Primary | ICD-10-CM

## 2023-04-24 DIAGNOSIS — R79.89 ELEVATED TSH: ICD-10-CM

## 2023-04-24 DIAGNOSIS — O26.893 VAGINAL DISCHARGE DURING PREGNANCY IN THIRD TRIMESTER: ICD-10-CM

## 2023-04-24 PROCEDURE — 0502F SUBSEQUENT PRENATAL CARE: CPT | Performed by: OBSTETRICS & GYNECOLOGY

## 2023-04-27 PROBLEM — Z34.83 PRENATAL CARE, SUBSEQUENT PREGNANCY IN THIRD TRIMESTER: Status: ACTIVE | Noted: 2023-01-05

## 2023-04-27 PROBLEM — O02.1 MISSED AB: Status: ACTIVE | Noted: 2023-04-27

## 2023-04-27 PROBLEM — O09.93 HIGH-RISK PREGNANCY IN THIRD TRIMESTER: Status: ACTIVE | Noted: 2023-04-27

## 2023-04-27 PROBLEM — N89.8 VAGINAL DISCHARGE DURING PREGNANCY IN THIRD TRIMESTER: Status: ACTIVE | Noted: 2023-04-27

## 2023-04-27 PROBLEM — R79.89 ELEVATED TSH: Status: ACTIVE | Noted: 2023-04-27

## 2023-04-27 PROBLEM — Z85.43 PERSONAL HISTORY OF OVARIAN CANCER: Status: ACTIVE | Noted: 2023-04-27

## 2023-04-27 PROBLEM — O26.893 VAGINAL DISCHARGE DURING PREGNANCY IN THIRD TRIMESTER: Status: ACTIVE | Noted: 2023-04-27

## 2023-04-27 NOTE — PROGRESS NOTES
29 y.o.  at 30w2d EGA Estimated Date of Delivery: 23 here for ROBBIN:     Pt seen and examined. No concerns/complaints  Denies VB, LOF, CTX. Endorses (+) FM. Denies fevers / chills / chest pain / shortness of breath. Denies HA, changes with vision, RUQ pain, edema. MWQ reviewed. Pregnancy is complicated by nausea, vomiting, Rh negative, previous low transverse  for failure to progress at 40 weeks, and history of epithelial ovarian cancer. Has been cleared by Anali Belle. Sees Dr. Roland Cannon whose recommendations included: wait 6 months, obtain CT and blood work at that time, if clear, ok to conceive. Ongoing annual CT scans have been recommended. Last CT was normal in 22. Objective:  /70   Pulse (!) 104   Wt 149 lb (67.6 kg)   LMP 2022 (Exact Date)   BMI 27.25 kg/m²   Gen: AO, NAD  Abd: Soft, NT, gravid   Ext: Mild LE edema  OMM: Increased lumbar lordosis    Assessment/Plan:   Diagnosis Orders   1. Prenatal care, subsequent pregnancy in third trimester        2. High-risk pregnancy in third trimester        3. Blood type, Rh negative        4. Vaginal discharge during pregnancy in third trimester        5. Elevated TSH        6. Marginal insertion of umbilical cord affecting management of mother (RESOLVED)        7. History of  section        8. Personal history of ovarian cancer        9. Right ovarian epithelial cancer ([address]        10. Hx of one miscarriage           1. Prenatal Care, Subsequent pregnancy   - Reassuring maternal / fetal status   - Aneuploidy Screen: declines    - AFP: declines  - Anatomy: 23 -- normal male fetus, vertex. EFW 26.3%ile, Posterior placenta no previa, (+) MCI, CL 5.16cm no funnel.   - PNL: O-/ab-, RI, HIV neg, syphilis neg, HepB neg, HepC neg, Varicella Immune, TSH: 2.55, UDS neg.  Hgb 12.2, , GCCT/trich neg, Ucx no growth, PAP: 22 NILM / HPV (-)   - 28 wk: pending    - Tdap: 23  - Rho Elizabeth: 23   - GBS: 36

## 2023-05-08 ENCOUNTER — ROUTINE PRENATAL (OUTPATIENT)
Dept: OBGYN CLINIC | Age: 33
End: 2023-05-08

## 2023-05-08 VITALS
SYSTOLIC BLOOD PRESSURE: 118 MMHG | OXYGEN SATURATION: 98 % | WEIGHT: 149.8 LBS | TEMPERATURE: 97.8 F | HEIGHT: 62 IN | BODY MASS INDEX: 27.57 KG/M2 | HEART RATE: 114 BPM | DIASTOLIC BLOOD PRESSURE: 72 MMHG

## 2023-05-08 DIAGNOSIS — Z85.43 PERSONAL HISTORY OF OVARIAN CANCER: ICD-10-CM

## 2023-05-08 DIAGNOSIS — R79.89 ELEVATED TSH: ICD-10-CM

## 2023-05-08 DIAGNOSIS — Z34.83 PRENATAL CARE, SUBSEQUENT PREGNANCY IN THIRD TRIMESTER: Primary | ICD-10-CM

## 2023-05-08 DIAGNOSIS — Z98.891 HISTORY OF CESAREAN SECTION: ICD-10-CM

## 2023-05-08 DIAGNOSIS — O43.199 MARGINAL INSERTION OF UMBILICAL CORD AFFECTING MANAGEMENT OF MOTHER: ICD-10-CM

## 2023-05-08 DIAGNOSIS — Z67.91 BLOOD TYPE, RH NEGATIVE: ICD-10-CM

## 2023-05-08 DIAGNOSIS — O09.93 HIGH-RISK PREGNANCY IN THIRD TRIMESTER: ICD-10-CM

## 2023-05-08 PROCEDURE — 0502F SUBSEQUENT PRENATAL CARE: CPT | Performed by: OBSTETRICS & GYNECOLOGY

## 2023-05-15 ENCOUNTER — TELEPHONE (OUTPATIENT)
Dept: OBGYN CLINIC | Age: 33
End: 2023-05-15

## 2023-05-15 NOTE — TELEPHONE ENCOUNTER
33w 2d , pt calling due to increased contractions. She says she has had 2 in the last hour. + FM no LOF. Advised patient to monitor for contractions. If having more than 6 contractions in one hour, lasting longer than a minute, getting stronger to the point she has to stop what she is doing then report to labor and delivery for evaluation. Also advised to report to labor and delivery if she has decreased or no fetal movement or if she has any vaginal bleeding, leakage or gush of vaginal fluid.  Please advise

## 2023-05-22 ENCOUNTER — ROUTINE PRENATAL (OUTPATIENT)
Dept: OBGYN CLINIC | Age: 33
End: 2023-05-22

## 2023-05-22 VITALS
DIASTOLIC BLOOD PRESSURE: 64 MMHG | HEART RATE: 88 BPM | WEIGHT: 150.8 LBS | BODY MASS INDEX: 27.58 KG/M2 | SYSTOLIC BLOOD PRESSURE: 106 MMHG

## 2023-05-22 DIAGNOSIS — Z34.83 PRENATAL CARE, SUBSEQUENT PREGNANCY IN THIRD TRIMESTER: Primary | ICD-10-CM

## 2023-05-22 DIAGNOSIS — Z98.891 HISTORY OF CESAREAN SECTION: ICD-10-CM

## 2023-05-22 DIAGNOSIS — O09.93 HIGH-RISK PREGNANCY IN THIRD TRIMESTER: ICD-10-CM

## 2023-05-22 DIAGNOSIS — Z85.43 PERSONAL HISTORY OF OVARIAN CANCER: ICD-10-CM

## 2023-05-22 PROCEDURE — 0502F SUBSEQUENT PRENATAL CARE: CPT | Performed by: OBSTETRICS & GYNECOLOGY

## 2023-05-22 NOTE — PROGRESS NOTES
29 y.o.  at 34w2d EGA Estimated Date of Delivery: 23 here for ROBBIN:     Pt seen and examined. No concerns/complaints. She did have some contractions last week that resolved. Denies VB, LOF, CTX. Endorses (+) FM. Denies fevers / chills / chest pain / shortness of breath. Denies HA, changes with vision, RUQ pain, edema. MWQ reviewed. Objective:  /64   Pulse 88   Wt 150 lb 12.8 oz (68.4 kg)   LMP 2022 (Exact Date)   BMI 27.58 kg/m²   Gen: AO, NAD  Abd: Soft, NT, gravid   Ext: Mild LE edema    Assessment/Plan:   Diagnosis Orders   1. Prenatal care, subsequent pregnancy in third trimester        2. History of  section        3. High-risk pregnancy in third trimester        4. Personal history of ovarian cancer         -RTC In 2 weeks   -Scheduled RCS with salpingectomy   -PTL precautions   - reassuring maternal / fetal status     Follow Up  Return OB precautions reviewed   Return in about 2 weeks (around 2023). Approximately 5 minutes spent in room counseling patient on condition and coordination of care with over 50% in direct face to face counseling.      Shonda Soto,

## 2023-06-19 ENCOUNTER — TELEPHONE (OUTPATIENT)
Dept: OBGYN | Age: 33
End: 2023-06-19

## 2023-06-19 ENCOUNTER — ROUTINE PRENATAL (OUTPATIENT)
Dept: OBGYN CLINIC | Age: 33
End: 2023-06-19

## 2023-06-19 ENCOUNTER — ANESTHESIA EVENT (OUTPATIENT)
Dept: LABOR AND DELIVERY | Age: 33
End: 2023-06-19
Payer: COMMERCIAL

## 2023-06-19 VITALS
HEART RATE: 105 BPM | WEIGHT: 156.5 LBS | OXYGEN SATURATION: 98 % | DIASTOLIC BLOOD PRESSURE: 66 MMHG | BODY MASS INDEX: 28.8 KG/M2 | HEIGHT: 62 IN | SYSTOLIC BLOOD PRESSURE: 102 MMHG

## 2023-06-19 DIAGNOSIS — O36.5939 IUGR (INTRAUTERINE GROWTH RESTRICTION) AFFECTING CARE OF MOTHER, THIRD TRIMESTER, OTHER FETUS: ICD-10-CM

## 2023-06-19 DIAGNOSIS — Z67.91 BLOOD TYPE, RH NEGATIVE: ICD-10-CM

## 2023-06-19 DIAGNOSIS — Z85.43 PERSONAL HISTORY OF OVARIAN CANCER: ICD-10-CM

## 2023-06-19 DIAGNOSIS — Z34.83 PRENATAL CARE, SUBSEQUENT PREGNANCY IN THIRD TRIMESTER: Primary | ICD-10-CM

## 2023-06-19 DIAGNOSIS — Z98.891 HISTORY OF CESAREAN SECTION: ICD-10-CM

## 2023-06-19 DIAGNOSIS — O43.199 MARGINAL INSERTION OF UMBILICAL CORD AFFECTING MANAGEMENT OF MOTHER: ICD-10-CM

## 2023-06-19 PROCEDURE — 0502F SUBSEQUENT PRENATAL CARE: CPT | Performed by: OBSTETRICS & GYNECOLOGY

## 2023-06-19 NOTE — TELEPHONE ENCOUNTER
Called patient to discuss plan for care following US today. I reviewed her options for seeing MFM as planned or moving forward with delivery since she is 38+2 EGA. Pt is okay with planned RLTCS with prophylactic unilateral salpingectomy tomorrow at 39+3 EGA. Pre-op instructions were reviewed and staff was notified.    Jerman

## 2023-06-19 NOTE — PROGRESS NOTES
29 y.o. Adolfo Essex at 38w2d EGA Estimated Date of Delivery: 7/1/23 here for ROBBIN:     Pt seen and examined. No concerns/complaints. Denies VB, LOF, CTX. Endorses (+) FM. Denies fevers / chills / chest pain / shortness of breath. Denies HA, changes with vision, RUQ pain, edema. MWQ reviewed. Objective:  /66   Pulse (!) 105   Ht 5' 2\" (1.575 m)   Wt 156 lb 8 oz (71 kg)   LMP 09/24/2022 (Exact Date)   SpO2 98%   BMI 28.62 kg/m²   Gen: AO, NAD  Abd: Soft, NT, gravid   Ext: Mild LE edema      OBSTETRICAL ULTRASOUND GROWTH     DATE: 6/19/23     PHYSICIAN:  Mathew Dave D.O.       SONOGRAPHER: SHA Cordova [facility]     INDICATION: Growth, BPP     TYPE OF SCAN: abdominal     FINDINGS:  A single viable intrauterine pregnancy is noted in cephalic presentation. Cardiac and somatic activity are noted. The following values were obtained:              Fetal heart rate                                               128bpm              BPD                                                                 8.56cm                        1.7 %              Head Circumference                                       33.18cm                      22.2 %               Abdominal Circumference                              30.99cm                      2.1 %              Femur Length                                                  7.33cm                        33.9 %              Amniotic fluid index                                         11.51cm              EFW                                                                2795g              12.7 percentile     Amniotic fluid volume is normal. Based on sonographic criteria the estimated fetal age is 36weeks and 1days with EDC of 7/16/23. There is a 15 day discordance with the established EDC of 7/1/23. The patient has a right lateral placenta that is adequate distance in relation to the internal cervical os.  The evaluation of the lower uterine segment and cervix reveals

## 2023-06-20 ENCOUNTER — ANESTHESIA (OUTPATIENT)
Dept: LABOR AND DELIVERY | Age: 33
End: 2023-06-20
Payer: COMMERCIAL

## 2023-06-20 ENCOUNTER — HOSPITAL ENCOUNTER (INPATIENT)
Age: 33
LOS: 2 days | Discharge: HOME | End: 2023-06-22
Attending: OBSTETRICS & GYNECOLOGY | Admitting: OBSTETRICS & GYNECOLOGY
Payer: COMMERCIAL

## 2023-06-20 DIAGNOSIS — O36.5939 IUGR (INTRAUTERINE GROWTH RESTRICTION) AFFECTING CARE OF MOTHER, THIRD TRIMESTER, OTHER FETUS: ICD-10-CM

## 2023-06-20 DIAGNOSIS — R55 PRE-SYNCOPE: ICD-10-CM

## 2023-06-20 DIAGNOSIS — Z85.43 PERSONAL HISTORY OF OVARIAN CANCER: ICD-10-CM

## 2023-06-20 DIAGNOSIS — Z34.83 PRENATAL CARE, SUBSEQUENT PREGNANCY IN THIRD TRIMESTER: ICD-10-CM

## 2023-06-20 DIAGNOSIS — N89.8 VAGINAL DISCHARGE DURING PREGNANCY IN THIRD TRIMESTER: ICD-10-CM

## 2023-06-20 DIAGNOSIS — O02.1 MISSED AB: ICD-10-CM

## 2023-06-20 DIAGNOSIS — O26.893 VAGINAL DISCHARGE DURING PREGNANCY IN THIRD TRIMESTER: ICD-10-CM

## 2023-06-20 DIAGNOSIS — Z98.891 S/P REPEAT LOW TRANSVERSE C-SECTION: Primary | ICD-10-CM

## 2023-06-20 DIAGNOSIS — O09.93 HIGH-RISK PREGNANCY IN THIRD TRIMESTER: ICD-10-CM

## 2023-06-20 DIAGNOSIS — R79.89 ELEVATED TSH: ICD-10-CM

## 2023-06-20 DIAGNOSIS — Z98.891 HISTORY OF CESAREAN SECTION: ICD-10-CM

## 2023-06-20 DIAGNOSIS — Z67.91 BLOOD TYPE, RH NEGATIVE: ICD-10-CM

## 2023-06-20 DIAGNOSIS — C56.1 RIGHT OVARIAN EPITHELIAL CANCER (HCC): ICD-10-CM

## 2023-06-20 DIAGNOSIS — O43.199 MARGINAL INSERTION OF UMBILICAL CORD AFFECTING MANAGEMENT OF MOTHER: ICD-10-CM

## 2023-06-20 LAB
ABO + RH BLD: NORMAL
AMPHETAMINES UR QL SCN>1000 NG/ML: NORMAL
BARBITURATES UR QL SCN>200 NG/ML: NORMAL
BASOPHILS # BLD: 0 K/UL (ref 0–0.2)
BASOPHILS NFR BLD: 0.4 %
BENZODIAZ UR QL SCN>200 NG/ML: NORMAL
BLD GP AB SCN SERPL QL: NORMAL
BLOOD GROUP ANTIBODIES SERPL: NORMAL
BUPRENORPHINE+NOR UR QL SCN: NORMAL
CANNABINOIDS UR QL SCN>50 NG/ML: NORMAL
COCAINE UR QL SCN: NORMAL
DAT IGG CAPTURE: NORMAL
DEPRECATED RDW RBC AUTO: 16.7 % (ref 12.4–15.4)
DRUG SCREEN COMMENT UR-IMP: NORMAL
EOSINOPHIL # BLD: 0.1 K/UL (ref 0–0.6)
EOSINOPHIL NFR BLD: 1.6 %
FENTANYL SCREEN, URINE: NORMAL
HCT VFR BLD AUTO: 27.6 % (ref 36–48)
HGB BLD-MCNC: 9.2 G/DL (ref 12–16)
LYMPHOCYTES # BLD: 1.3 K/UL (ref 1–5.1)
LYMPHOCYTES NFR BLD: 14.4 %
MCH RBC QN AUTO: 25.1 PG (ref 26–34)
MCHC RBC AUTO-ENTMCNC: 33.3 G/DL (ref 31–36)
MCV RBC AUTO: 75.3 FL (ref 80–100)
METHADONE UR QL SCN>300 NG/ML: NORMAL
MONOCYTES # BLD: 0.8 K/UL (ref 0–1.3)
MONOCYTES NFR BLD: 9.1 %
NEUTROPHILS # BLD: 6.7 K/UL (ref 1.7–7.7)
NEUTROPHILS NFR BLD: 74.5 %
OPIATES UR QL SCN>300 NG/ML: NORMAL
OXYCODONE UR QL SCN: NORMAL
PCP UR QL SCN>25 NG/ML: NORMAL
PH UR STRIP: 6 [PH]
PLATELET # BLD AUTO: 210 K/UL (ref 135–450)
PMV BLD AUTO: 7.6 FL (ref 5–10.5)
RBC # BLD AUTO: 3.67 M/UL (ref 4–5.2)
REAGIN+T PALLIDUM IGG+IGM SERPL-IMP: NORMAL
WBC # BLD AUTO: 9 K/UL (ref 4–11)

## 2023-06-20 PROCEDURE — 6360000002 HC RX W HCPCS: Performed by: OBSTETRICS & GYNECOLOGY

## 2023-06-20 PROCEDURE — 3609079900 HC CESAREAN SECTION: Performed by: OBSTETRICS & GYNECOLOGY

## 2023-06-20 PROCEDURE — 1220000000 HC SEMI PRIVATE OB R&B

## 2023-06-20 PROCEDURE — 88307 TISSUE EXAM BY PATHOLOGIST: CPT

## 2023-06-20 PROCEDURE — 2709999900 HC NON-CHARGEABLE SUPPLY: Performed by: OBSTETRICS & GYNECOLOGY

## 2023-06-20 PROCEDURE — 6370000000 HC RX 637 (ALT 250 FOR IP): Performed by: OBSTETRICS & GYNECOLOGY

## 2023-06-20 PROCEDURE — 85025 COMPLETE CBC W/AUTO DIFF WBC: CPT

## 2023-06-20 PROCEDURE — 86850 RBC ANTIBODY SCREEN: CPT

## 2023-06-20 PROCEDURE — 86901 BLOOD TYPING SEROLOGIC RH(D): CPT

## 2023-06-20 PROCEDURE — 86780 TREPONEMA PALLIDUM: CPT

## 2023-06-20 PROCEDURE — 2500000003 HC RX 250 WO HCPCS: Performed by: OBSTETRICS & GYNECOLOGY

## 2023-06-20 PROCEDURE — 6360000002 HC RX W HCPCS

## 2023-06-20 PROCEDURE — 6360000002 HC RX W HCPCS: Performed by: NURSE ANESTHETIST, CERTIFIED REGISTERED

## 2023-06-20 PROCEDURE — 88302 TISSUE EXAM BY PATHOLOGIST: CPT

## 2023-06-20 PROCEDURE — 2580000003 HC RX 258: Performed by: OBSTETRICS & GYNECOLOGY

## 2023-06-20 PROCEDURE — 3700000000 HC ANESTHESIA ATTENDED CARE: Performed by: OBSTETRICS & GYNECOLOGY

## 2023-06-20 PROCEDURE — 3700000001 HC ADD 15 MINUTES (ANESTHESIA): Performed by: OBSTETRICS & GYNECOLOGY

## 2023-06-20 PROCEDURE — 80307 DRUG TEST PRSMV CHEM ANLYZR: CPT

## 2023-06-20 PROCEDURE — 7100000000 HC PACU RECOVERY - FIRST 15 MIN: Performed by: OBSTETRICS & GYNECOLOGY

## 2023-06-20 PROCEDURE — 86870 RBC ANTIBODY IDENTIFICATION: CPT

## 2023-06-20 PROCEDURE — 86880 COOMBS TEST DIRECT: CPT

## 2023-06-20 PROCEDURE — 86900 BLOOD TYPING SEROLOGIC ABO: CPT

## 2023-06-20 PROCEDURE — 2500000003 HC RX 250 WO HCPCS: Performed by: NURSE ANESTHETIST, CERTIFIED REGISTERED

## 2023-06-20 PROCEDURE — 6370000000 HC RX 637 (ALT 250 FOR IP)

## 2023-06-20 PROCEDURE — 0UB60ZZ EXCISION OF LEFT FALLOPIAN TUBE, OPEN APPROACH: ICD-10-PCS | Performed by: OBSTETRICS & GYNECOLOGY

## 2023-06-20 PROCEDURE — 7100000001 HC PACU RECOVERY - ADDTL 15 MIN: Performed by: OBSTETRICS & GYNECOLOGY

## 2023-06-20 RX ORDER — IBUPROFEN 800 MG/1
TABLET ORAL
Status: COMPLETED
Start: 2023-06-20 | End: 2023-06-20

## 2023-06-20 RX ORDER — ONDANSETRON 2 MG/ML
INJECTION INTRAMUSCULAR; INTRAVENOUS
Status: COMPLETED
Start: 2023-06-20 | End: 2023-06-20

## 2023-06-20 RX ORDER — ONDANSETRON 2 MG/ML
INJECTION INTRAMUSCULAR; INTRAVENOUS PRN
Status: DISCONTINUED | OUTPATIENT
Start: 2023-06-20 | End: 2023-06-20 | Stop reason: SDUPTHER

## 2023-06-20 RX ORDER — SODIUM CHLORIDE, SODIUM LACTATE, POTASSIUM CHLORIDE, CALCIUM CHLORIDE 600; 310; 30; 20 MG/100ML; MG/100ML; MG/100ML; MG/100ML
INJECTION, SOLUTION INTRAVENOUS CONTINUOUS
Status: DISCONTINUED | OUTPATIENT
Start: 2023-06-20 | End: 2023-06-20

## 2023-06-20 RX ORDER — SODIUM CHLORIDE 0.9 % (FLUSH) 0.9 %
10 SYRINGE (ML) INJECTION PRN
Status: DISCONTINUED | OUTPATIENT
Start: 2023-06-20 | End: 2023-06-20

## 2023-06-20 RX ORDER — TRISODIUM CITRATE DIHYDRATE AND CITRIC ACID MONOHYDRATE 500; 334 MG/5ML; MG/5ML
30 SOLUTION ORAL ONCE
Status: DISCONTINUED | OUTPATIENT
Start: 2023-06-20 | End: 2023-06-20

## 2023-06-20 RX ORDER — ACETAMINOPHEN 500 MG
1000 TABLET ORAL EVERY 8 HOURS SCHEDULED
Status: DISCONTINUED | OUTPATIENT
Start: 2023-06-20 | End: 2023-06-22 | Stop reason: HOSPADM

## 2023-06-20 RX ORDER — SODIUM CHLORIDE, SODIUM LACTATE, POTASSIUM CHLORIDE, CALCIUM CHLORIDE 600; 310; 30; 20 MG/100ML; MG/100ML; MG/100ML; MG/100ML
INJECTION, SOLUTION INTRAVENOUS CONTINUOUS
Status: DISCONTINUED | OUTPATIENT
Start: 2023-06-20 | End: 2023-06-22 | Stop reason: HOSPADM

## 2023-06-20 RX ORDER — DOCUSATE SODIUM 100 MG/1
100 CAPSULE, LIQUID FILLED ORAL 2 TIMES DAILY
Status: DISCONTINUED | OUTPATIENT
Start: 2023-06-20 | End: 2023-06-22 | Stop reason: HOSPADM

## 2023-06-20 RX ORDER — SODIUM CHLORIDE 9 MG/ML
INJECTION, SOLUTION INTRAVENOUS PRN
Status: DISCONTINUED | OUTPATIENT
Start: 2023-06-20 | End: 2023-06-20

## 2023-06-20 RX ORDER — IBUPROFEN 800 MG/1
800 TABLET ORAL EVERY 8 HOURS
Status: DISCONTINUED | OUTPATIENT
Start: 2023-06-20 | End: 2023-06-20 | Stop reason: SDUPTHER

## 2023-06-20 RX ORDER — FAMOTIDINE 10 MG/ML
INJECTION, SOLUTION INTRAVENOUS PRN
Status: DISCONTINUED | OUTPATIENT
Start: 2023-06-20 | End: 2023-06-20 | Stop reason: SDUPTHER

## 2023-06-20 RX ORDER — SODIUM CHLORIDE 0.9 % (FLUSH) 0.9 %
5-40 SYRINGE (ML) INJECTION PRN
Status: DISCONTINUED | OUTPATIENT
Start: 2023-06-20 | End: 2023-06-22 | Stop reason: HOSPADM

## 2023-06-20 RX ORDER — SODIUM CHLORIDE 0.9 % (FLUSH) 0.9 %
5-40 SYRINGE (ML) INJECTION PRN
Status: DISCONTINUED | OUTPATIENT
Start: 2023-06-20 | End: 2023-06-20

## 2023-06-20 RX ORDER — MORPHINE SULFATE 10 MG/ML
INJECTION, SOLUTION INTRAMUSCULAR; INTRAVENOUS PRN
Status: DISCONTINUED | OUTPATIENT
Start: 2023-06-20 | End: 2023-06-20 | Stop reason: SDUPTHER

## 2023-06-20 RX ORDER — HYDROMORPHONE HYDROCHLORIDE 1 MG/ML
0.25 INJECTION, SOLUTION INTRAMUSCULAR; INTRAVENOUS; SUBCUTANEOUS
Status: DISCONTINUED | OUTPATIENT
Start: 2023-06-20 | End: 2023-06-22 | Stop reason: HOSPADM

## 2023-06-20 RX ORDER — FAMOTIDINE 10 MG/ML
INJECTION, SOLUTION INTRAVENOUS
Status: COMPLETED
Start: 2023-06-20 | End: 2023-06-20

## 2023-06-20 RX ORDER — DIPHENHYDRAMINE HYDROCHLORIDE 50 MG/ML
25 INJECTION INTRAMUSCULAR; INTRAVENOUS EVERY 6 HOURS PRN
Status: DISCONTINUED | OUTPATIENT
Start: 2023-06-20 | End: 2023-06-22 | Stop reason: HOSPADM

## 2023-06-20 RX ORDER — LANOLIN 100 %
OINTMENT (GRAM) TOPICAL
Status: DISCONTINUED | OUTPATIENT
Start: 2023-06-20 | End: 2023-06-22 | Stop reason: HOSPADM

## 2023-06-20 RX ORDER — SODIUM CHLORIDE 0.9 % (FLUSH) 0.9 %
5-40 SYRINGE (ML) INJECTION EVERY 12 HOURS SCHEDULED
Status: DISCONTINUED | OUTPATIENT
Start: 2023-06-20 | End: 2023-06-22 | Stop reason: HOSPADM

## 2023-06-20 RX ORDER — HYDROMORPHONE HYDROCHLORIDE 1 MG/ML
0.5 INJECTION, SOLUTION INTRAMUSCULAR; INTRAVENOUS; SUBCUTANEOUS
Status: DISCONTINUED | OUTPATIENT
Start: 2023-06-20 | End: 2023-06-22 | Stop reason: HOSPADM

## 2023-06-20 RX ORDER — ONDANSETRON 2 MG/ML
4 INJECTION INTRAMUSCULAR; INTRAVENOUS EVERY 6 HOURS PRN
Status: DISCONTINUED | OUTPATIENT
Start: 2023-06-20 | End: 2023-06-22 | Stop reason: HOSPADM

## 2023-06-20 RX ORDER — SODIUM CHLORIDE 9 MG/ML
INJECTION, SOLUTION INTRAVENOUS PRN
Status: DISCONTINUED | OUTPATIENT
Start: 2023-06-20 | End: 2023-06-22 | Stop reason: HOSPADM

## 2023-06-20 RX ORDER — LIDOCAINE HYDROCHLORIDE 10 MG/ML
1 INJECTION, SOLUTION EPIDURAL; INFILTRATION; INTRACAUDAL; PERINEURAL
Status: DISCONTINUED | OUTPATIENT
Start: 2023-06-20 | End: 2023-06-20

## 2023-06-20 RX ORDER — SODIUM CHLORIDE, SODIUM LACTATE, POTASSIUM CHLORIDE, AND CALCIUM CHLORIDE .6; .31; .03; .02 G/100ML; G/100ML; G/100ML; G/100ML
1000 INJECTION, SOLUTION INTRAVENOUS ONCE
Status: DISCONTINUED | OUTPATIENT
Start: 2023-06-20 | End: 2023-06-20

## 2023-06-20 RX ORDER — GLYCOPYRROLATE 0.2 MG/ML
INJECTION INTRAMUSCULAR; INTRAVENOUS PRN
Status: DISCONTINUED | OUTPATIENT
Start: 2023-06-20 | End: 2023-06-20 | Stop reason: SDUPTHER

## 2023-06-20 RX ORDER — OXYTOCIN 10 [USP'U]/ML
INJECTION, SOLUTION INTRAMUSCULAR; INTRAVENOUS PRN
Status: DISCONTINUED | OUTPATIENT
Start: 2023-06-20 | End: 2023-06-20 | Stop reason: SDUPTHER

## 2023-06-20 RX ORDER — MIDAZOLAM HYDROCHLORIDE 5 MG/ML
2 INJECTION, SOLUTION INTRAMUSCULAR; INTRAVENOUS
Status: DISCONTINUED | OUTPATIENT
Start: 2023-06-20 | End: 2023-06-20

## 2023-06-20 RX ORDER — BUPIVACAINE HYDROCHLORIDE 7.5 MG/ML
INJECTION, SOLUTION INTRASPINAL PRN
Status: DISCONTINUED | OUTPATIENT
Start: 2023-06-20 | End: 2023-06-20 | Stop reason: SDUPTHER

## 2023-06-20 RX ORDER — KETOROLAC TROMETHAMINE 30 MG/ML
INJECTION, SOLUTION INTRAMUSCULAR; INTRAVENOUS
Status: COMPLETED
Start: 2023-06-20 | End: 2023-06-20

## 2023-06-20 RX ORDER — IBUPROFEN 800 MG/1
800 TABLET ORAL EVERY 8 HOURS
Status: DISCONTINUED | OUTPATIENT
Start: 2023-06-21 | End: 2023-06-20

## 2023-06-20 RX ORDER — SODIUM CHLORIDE 0.9 % (FLUSH) 0.9 %
5-40 SYRINGE (ML) INJECTION EVERY 12 HOURS SCHEDULED
Status: DISCONTINUED | OUTPATIENT
Start: 2023-06-20 | End: 2023-06-20

## 2023-06-20 RX ORDER — OXYCODONE HYDROCHLORIDE 5 MG/1
5 TABLET ORAL EVERY 4 HOURS PRN
Status: DISCONTINUED | OUTPATIENT
Start: 2023-06-20 | End: 2023-06-22 | Stop reason: HOSPADM

## 2023-06-20 RX ORDER — SODIUM CHLORIDE 0.9 % (FLUSH) 0.9 %
10 SYRINGE (ML) INJECTION EVERY 12 HOURS SCHEDULED
Status: DISCONTINUED | OUTPATIENT
Start: 2023-06-20 | End: 2023-06-20

## 2023-06-20 RX ORDER — FERROUS SULFATE 325(65) MG
325 TABLET ORAL 2 TIMES DAILY WITH MEALS
Status: DISCONTINUED | OUTPATIENT
Start: 2023-06-20 | End: 2023-06-22 | Stop reason: HOSPADM

## 2023-06-20 RX ORDER — PHENYLEPHRINE HYDROCHLORIDE 10 MG/ML
INJECTION INTRAVENOUS PRN
Status: DISCONTINUED | OUTPATIENT
Start: 2023-06-20 | End: 2023-06-20 | Stop reason: SDUPTHER

## 2023-06-20 RX ORDER — CLINDAMYCIN PHOSPHATE 900 MG/50ML
900 INJECTION INTRAVENOUS
Status: COMPLETED | OUTPATIENT
Start: 2023-06-20 | End: 2023-06-20

## 2023-06-20 RX ORDER — KETOROLAC TROMETHAMINE 30 MG/ML
30 INJECTION, SOLUTION INTRAMUSCULAR; INTRAVENOUS EVERY 6 HOURS
Status: DISCONTINUED | OUTPATIENT
Start: 2023-06-20 | End: 2023-06-20

## 2023-06-20 RX ORDER — IBUPROFEN 800 MG/1
800 TABLET ORAL EVERY 8 HOURS
Status: DISCONTINUED | OUTPATIENT
Start: 2023-06-20 | End: 2023-06-22 | Stop reason: HOSPADM

## 2023-06-20 RX ADMIN — KETOROLAC TROMETHAMINE 30 MG: 30 INJECTION, SOLUTION INTRAMUSCULAR; INTRAVENOUS at 11:06

## 2023-06-20 RX ADMIN — MORPHINE SULFATE 0.15 MG: 10 INJECTION, SOLUTION INTRAMUSCULAR; INTRAVENOUS at 08:15

## 2023-06-20 RX ADMIN — CLINDAMYCIN PHOSPHATE 900 MG: 900 INJECTION, SOLUTION INTRAVENOUS at 08:10

## 2023-06-20 RX ADMIN — IBUPROFEN 800 MG: 800 TABLET, FILM COATED ORAL at 21:02

## 2023-06-20 RX ADMIN — SODIUM CHLORIDE, POTASSIUM CHLORIDE, SODIUM LACTATE AND CALCIUM CHLORIDE: 600; 310; 30; 20 INJECTION, SOLUTION INTRAVENOUS at 08:33

## 2023-06-20 RX ADMIN — OXYTOCIN 10 UNITS: 10 INJECTION INTRAVENOUS at 09:15

## 2023-06-20 RX ADMIN — SODIUM CHLORIDE, POTASSIUM CHLORIDE, SODIUM LACTATE AND CALCIUM CHLORIDE: 600; 310; 30; 20 INJECTION, SOLUTION INTRAVENOUS at 09:14

## 2023-06-20 RX ADMIN — GENTAMICIN SULFATE 68.4 MG: 40 INJECTION, SOLUTION INTRAMUSCULAR; INTRAVENOUS at 08:19

## 2023-06-20 RX ADMIN — PHENYLEPHRINE HYDROCHLORIDE 200 MCG: 10 INJECTION INTRAVENOUS at 09:04

## 2023-06-20 RX ADMIN — ONDANSETRON 4 MG: 2 INJECTION INTRAMUSCULAR; INTRAVENOUS at 07:55

## 2023-06-20 RX ADMIN — FAMOTIDINE 20 MG: 10 INJECTION, SOLUTION INTRAVENOUS at 07:55

## 2023-06-20 RX ADMIN — ACETAMINOPHEN 1000 MG: 500 TABLET ORAL at 17:32

## 2023-06-20 RX ADMIN — OXYTOCIN 20 UNITS: 10 INJECTION INTRAVENOUS at 08:49

## 2023-06-20 RX ADMIN — Medication 10 ML: at 21:03

## 2023-06-20 RX ADMIN — Medication 87.3 MILLI-UNITS/MIN: at 10:00

## 2023-06-20 RX ADMIN — SODIUM CHLORIDE, POTASSIUM CHLORIDE, SODIUM LACTATE AND CALCIUM CHLORIDE: 600; 310; 30; 20 INJECTION, SOLUTION INTRAVENOUS at 06:35

## 2023-06-20 RX ADMIN — GLYCOPYRROLATE 0.1 MG: 0.2 INJECTION, SOLUTION INTRAMUSCULAR; INTRAVENOUS at 09:05

## 2023-06-20 RX ADMIN — BUPIVACAINE HYDROCHLORIDE 1.6 ML: 7.5 INJECTION, SOLUTION SUBARACHNOID at 08:15

## 2023-06-20 RX ADMIN — SODIUM CHLORIDE, POTASSIUM CHLORIDE, SODIUM LACTATE AND CALCIUM CHLORIDE: 600; 310; 30; 20 INJECTION, SOLUTION INTRAVENOUS at 13:17

## 2023-06-20 RX ADMIN — DOCUSATE SODIUM 100 MG: 100 CAPSULE, LIQUID FILLED ORAL at 21:01

## 2023-06-20 ASSESSMENT — PAIN SCALES - GENERAL: PAINLEVEL_OUTOF10: 2

## 2023-06-20 NOTE — LACTATION NOTE
This note was copied from a baby's chart. Lactation Progress Note      Data:   Initial lactation consult with multip on DOS per RN request. RN states initial feeding attempt in recovery that infant has been sleepy, and they have been offering several large gtts of colostrum. Request initial consult. MOB states that she breast fed her 3year old for over 6 months, but had frequent mastitis. Action: Introduced self to patient as Lactation RN, name and phone number written on white board in room. Breastfeeding Booklet brought to bedside with contents reviewed. Assisted helping mob with providing more EBM to infant, and attempting to latch. After several minutes of giving infant EBM, airam was briefly achieved over the next 2-3 minutes. MOB reports strong suck burst. Infant then falling back to sleep after about 5 minutes of intermittent suck burst at breast. Reassurance provided. Encouraged mob to continue sts with infant and offering ebm q2 hours until infant waking up with nutritive suck at breast. Reviewed with mother what to expect over the next  24-48 hours with infant feedings, infant output, and breast care. Reviewed infant feeding cues and encouraged mother to allow infant to breast feed on demand, a minimum of 8-12 times a day after the first day of life. Binder and breast feeding log reviewed, all questions answered. Mother instructed to call Lactation nurse for F/U care as needed. RN at bedside and was updated on infant feeding, and education that was provided to family. Response: MOB verbalizes understanding. Will f/u as needed for care.

## 2023-06-20 NOTE — ANESTHESIA PROCEDURE NOTES
Spinal Block    Patient location during procedure: OB  End time: 6/20/2023 8:15 AM  Reason for block: primary anesthetic  Staffing  Performed: resident/CRNA   Anesthesiologist: Eileen Rodrigues MD  Resident/CRNA: RONALD Ochoa CRNA  Spinal Block  Patient position: sitting  Prep: Betadine  Patient monitoring: cardiac monitor, frequent blood pressure checks and continuous pulse ox  Approach: midline  Location: L3/L4  Provider prep: mask and sterile gloves  Local infiltration: lidocaine  Needle  Needle type: Pencan   Needle gauge: 25 G  Needle length: 3.5 in  Assessment  Sensory level: T4  Swirl obtained: Yes  CSF: clear  Attempts: 1  Hemodynamics: stable  Additional Notes  Sitting position betadine x3 Sterile prep and drape. Skin wheal with 1% xylocaine. SAB with 20 ga. Introducer. Clear CSF aspirated. Medication injected. Pt assisted supine.  ROHINI   Preanesthetic Checklist  Completed: patient identified, IV checked, risks and benefits discussed, equipment checked, pre-op evaluation, timeout performed, anesthesia consent given, oxygen available and monitors applied/VS acknowledged

## 2023-06-20 NOTE — PLAN OF CARE
Problem: Vaginal Birth or  Section  Goal: Fetal and maternal status remain reassuring during the birth process  Description:  Birth OB-Pregnancy care plan goal which identifies if the fetal and maternal status remain reassuring during the birth process  Outcome: Progressing     Problem: Postpartum  Goal: Experiences normal postpartum course  Description:  Postpartum OB-Pregnancy care plan goal which identifies if the mother is experiencing a normal postpartum course  Outcome: Progressing  Goal: Appropriate maternal -  bonding  Description:  Postpartum OB-Pregnancy care plan goal which identifies if the mother and  are bonding appropriately  Outcome: Progressing  Goal: Establishment of infant feeding pattern  Description:  Postpartum OB-Pregnancy care plan goal which identifies if the mother is establishing a feeding pattern with their   Outcome: Progressing  Goal: Incisions, wounds, or drain sites healing without S/S of infection  Outcome: Progressing     Problem: Pain  Goal: Verbalizes/displays adequate comfort level or baseline comfort level  Outcome: Progressing     Problem: Infection - Adult  Goal: Absence of infection at discharge  Outcome: Progressing  Goal: Absence of infection during hospitalization  Outcome: Progressing     Problem: Safety - Adult  Goal: Free from fall injury  Outcome: Progressing

## 2023-06-20 NOTE — PLAN OF CARE
Problem: Vaginal Birth or  Section  Goal: Fetal and maternal status remain reassuring during the birth process  Description:  Birth OB-Pregnancy care plan goal which identifies if the fetal and maternal status remain reassuring during the birth process  2023 by Cici Alvarez RN  Outcome: Progressing  2023 1542 by Nahomy Berg RN  Outcome: Progressing     Problem: Postpartum  Goal: Experiences normal postpartum course  Description:  Postpartum OB-Pregnancy care plan goal which identifies if the mother is experiencing a normal postpartum course  2023 by Cici Alvarez RN  Outcome: Progressing  2023 1542 by Nahomy Berg RN  Outcome: Progressing  Goal: Appropriate maternal -  bonding  Description:  Postpartum OB-Pregnancy care plan goal which identifies if the mother and  are bonding appropriately  2023 by Cici Alvarez RN  Outcome: Progressing  2023 1542 by Nahomy Berg RN  Outcome: Progressing  Goal: Establishment of infant feeding pattern  Description:  Postpartum OB-Pregnancy care plan goal which identifies if the mother is establishing a feeding pattern with their   2023 by Cici Alvarez RN  Outcome: Progressing  2023 1542 by Nahomy Berg RN  Outcome: Progressing  Goal: Incisions, wounds, or drain sites healing without S/S of infection  2023 by Cici Alvarez RN  Outcome: Progressing  2023 1542 by Nahomy Berg RN  Outcome: Progressing     Problem: Pain  Goal: Verbalizes/displays adequate comfort level or baseline comfort level  2023 by Cici Alvarez RN  Outcome: Progressing  2023 1542 by Nahomy Berg RN  Outcome: Progressing     Problem: Infection - Adult  Goal: Absence of infection at discharge  2023 by Cici Alvarez RN  Outcome: Progressing  2023 1542 by Nahomy Berg RN  Outcome: Progressing  Goal: Absence of infection during

## 2023-06-20 NOTE — L&D DELIVERY SUMMARY NOTE
the fascia while the underlying rectus muscles were dissected off bluntly and sharply with Ramos scissors. Dense scar tissue noted and carefully dissected through. Sharlie Dubs clamps were placed on the posterior aspect of the fascia while the underlying rectus muscles were dissected off bluntly and sharply with Ramos scissors  to the level of the pubic bone. Again, dense scar tissue was carefully dissected through. Peritoneum was then entered bluntly and extended laterally with manual traction. BERNARD was identified and bladder flap was made with pick ups and Metzenbaum scissors. Bladder blade was placed to keep bladder out of operative field. The uterine incision was then made with scalpel and extended cephalad and caudad fashion with manual traction. Fetal head was then brought to the uterine incision and delivered with gentle fundal pressure in the HOSEA position. Mouth and nose were bulb suctioned. Cord was clamped and cut. The infant was handed off to the awaiting nursing staff after being presented to the parents. Placenta was then delivered with gentle traction and fundal massage. Uterus was then exteriorized and cleared of all clots and debris. Uterine incision was then  re-approximated with 1 Vicryl on a running locked suture followed by a  second imbricating stitch. Posterior cul-de-sac was then suctioned until dry. Left Fallopian tube was then identified and then followed down the fimbriated end. Tube was grasped with melanie clamps. Alize clamp was placed along avascular plane of mesosalpinx. Tube was then amputated with Bovie cautery. Region was then suture ligated with 0 chromic suture in segments. Tube was then handed off to the awaiting nursing staff. Uterus and left ovary was inspected and found to be within normal limits and replaced into the pelvis. Pericolic gutters were then cleared of clots and debris. Uterine incision was then re-examined and found to be hemostatic.   Small area of serosal bleeding

## 2023-06-20 NOTE — H&P
Obstetrics History and Physical    CC:   Chief Complaint   Patient presents with    Scheduled        HPI: Zuleika Davis is a 29 y.o. Dev Christina at 38w3d who presents for scheduled RCS + Unilateral Salpingectomy. Denies vaginal bleeding. Denies leaking fluid. Denies contractions. Endorses fetal movement. Pregnancy has otherwise been complicated by Rh (-) status, N/V, Hx of Ovarian CA (RSO on 19, chemotherapy, follows with Gubbi), Marginal Cord (resolved), Hx of PLTCS. Prenatal Flow:   - Aneuploidy Screen: declines    - AFP: declines  - Anatomy: 23 -- normal male fetus, vertex. EFW 26.3%ile, Posterior placenta no previa, (+) MCI, CL 5.16cm no funnel.   - PNL: O-/ab-, RI, HIV neg, syphilis neg, HepB neg, HepC neg, Varicella Immune, TSH: 2.55, UDS neg. Hgb 12.2, , GCCT/trich neg, Ucx no growth, PAP: 22 NILM / HPV (-)   - 28 wk: GCT 90 / Hgb 10.7 / [id number]  - Tdap: 23  - Rho Elizabeth: 23   - GBS: neg    Most Recent US:   OBSTETRICAL ULTRASOUND GROWTH     DATE: 23     PHYSICIAN:  Sabine Lopez D.O.       SONOGRAPHER: SHA Cordova RDMS     INDICATION: Growth, BPP     TYPE OF SCAN: abdominal     FINDINGS:  A single viable intrauterine pregnancy is noted in cephalic presentation. Cardiac and somatic activity are noted.      The following values were obtained:              Fetal heart rate                                               128bpm              BPD                                                                 8.56cm                        1.7 %              Head Circumference                                       33.18cm                      22.2 %               Abdominal Circumference                              30.99cm                      2.1 %              Femur Length                                                  7.33cm                        33.9 %              Amniotic fluid index                                         11.51cm              EFW

## 2023-06-20 NOTE — ANESTHESIA PRE PROCEDURE
Department of Anesthesiology  Preprocedure Note       Name:  Zuleika Davis   Age:  29 y.o.  :  1990                                          MRN:  [id number]         Date:  2023      Surgeon: Alessio Hassan):  Klaudia Baig DO    Procedure: Procedure(s):   SECTION    Medications prior to admission:   Prior to Admission medications    Medication Sig Start Date End Date Taking?  Authorizing Provider   Prenatal Multivit-Min-Fe-FA (PRE- PO) Take by mouth daily    Historical Provider, MD       Current medications:    Current Facility-Administered Medications   Medication Dose Route Frequency Provider Last Rate Last Admin    lidocaine PF 1 % injection 1 mL  1 mL IntraDERmal Once PRN Petr Parker MD        lactated ringers IV soln infusion   IntraVENous Continuous Petr Parker MD        sodium chloride flush 0.9 % injection 5-40 mL  5-40 mL IntraVENous 2 times per day Petr Parker MD        sodium chloride flush 0.9 % injection 5-40 mL  5-40 mL IntraVENous PRN Petr Parker MD        0.9 % sodium chloride infusion   IntraVENous PRN Petr Parker MD        midazolam PF (VERSED) injection 2 mg  2 mg IntraVENous Once PRN Petr Parker MD        lactated ringers IV soln infusion   IntraVENous Continuous Klaudia Baig  mL/hr at 23 2911 New Bag at 23 2728    lactated ringers IV soln infusion   IntraVENous Continuous Klaudia Baig DO        lactated ringers bolus  1,000 mL IntraVENous Once Klaudia Baig DO        sodium chloride flush 0.9 % injection 10 mL  10 mL IntraVENous 2 times per day Klaudia Baig DO        sodium chloride flush 0.9 % injection 10 mL  10 mL IntraVENous PRN Klaudia Baig DO        0.9 % sodium chloride infusion   IntraVENous PRN Klaudia Baig DO        citric acid-sodium citrate (BICITRA) solution 30 mL  30 mL Oral Once Klaudia Baig DO        oxytocin (PITOCIN) 30 units in 500 mL infusion  87.3

## 2023-06-20 NOTE — PROGRESS NOTES
First time get up with pt assisted by NITIN Mcdermott RN and NITIN Waldrop SA. Pt was able to ambulate to the bathroom, cervantes/alexis care provided, clean gown, and underwear. Pt able to ambulate back to bed with standby assist. FOB at bedside with infant. Complete linen changed, comfort pad added to bed and housekeeping in room for clean up. Will continue to monitor.

## 2023-06-21 PROBLEM — Z98.891 S/P REPEAT LOW TRANSVERSE C-SECTION: Status: ACTIVE | Noted: 2023-06-21

## 2023-06-21 LAB
BASOPHILS # BLD: 0 K/UL (ref 0–0.2)
BASOPHILS NFR BLD: 0.4 %
DEPRECATED RDW RBC AUTO: 17 % (ref 12.4–15.4)
EOSINOPHIL # BLD: 0.1 K/UL (ref 0–0.6)
EOSINOPHIL NFR BLD: 1.5 %
HCT VFR BLD AUTO: 23.3 % (ref 36–48)
HGB BLD-MCNC: 7.6 G/DL (ref 12–16)
LYMPHOCYTES # BLD: 1 K/UL (ref 1–5.1)
LYMPHOCYTES NFR BLD: 11.6 %
MCH RBC QN AUTO: 24.7 PG (ref 26–34)
MCHC RBC AUTO-ENTMCNC: 32.6 G/DL (ref 31–36)
MCV RBC AUTO: 75.9 FL (ref 80–100)
MONOCYTES # BLD: 0.9 K/UL (ref 0–1.3)
MONOCYTES NFR BLD: 11.2 %
NEUTROPHILS # BLD: 6.3 K/UL (ref 1.7–7.7)
NEUTROPHILS NFR BLD: 75.3 %
PLATELET # BLD AUTO: 172 K/UL (ref 135–450)
PMV BLD AUTO: 7.7 FL (ref 5–10.5)
RBC # BLD AUTO: 3.07 M/UL (ref 4–5.2)
WBC # BLD AUTO: 8.3 K/UL (ref 4–11)

## 2023-06-21 PROCEDURE — 36415 COLL VENOUS BLD VENIPUNCTURE: CPT

## 2023-06-21 PROCEDURE — 1220000000 HC SEMI PRIVATE OB R&B

## 2023-06-21 PROCEDURE — 85025 COMPLETE CBC W/AUTO DIFF WBC: CPT

## 2023-06-21 PROCEDURE — 6370000000 HC RX 637 (ALT 250 FOR IP): Performed by: OBSTETRICS & GYNECOLOGY

## 2023-06-21 PROCEDURE — 2580000003 HC RX 258: Performed by: OBSTETRICS & GYNECOLOGY

## 2023-06-21 RX ADMIN — FERROUS SULFATE TAB 325 MG (65 MG ELEMENTAL FE) 325 MG: 325 (65 FE) TAB at 18:02

## 2023-06-21 RX ADMIN — ACETAMINOPHEN 1000 MG: 500 TABLET ORAL at 01:00

## 2023-06-21 RX ADMIN — IBUPROFEN 800 MG: 800 TABLET, FILM COATED ORAL at 20:54

## 2023-06-21 RX ADMIN — DOCUSATE SODIUM 100 MG: 100 CAPSULE, LIQUID FILLED ORAL at 09:23

## 2023-06-21 RX ADMIN — DOCUSATE SODIUM 100 MG: 100 CAPSULE, LIQUID FILLED ORAL at 20:54

## 2023-06-21 RX ADMIN — ACETAMINOPHEN 1000 MG: 500 TABLET ORAL at 09:23

## 2023-06-21 RX ADMIN — IBUPROFEN 800 MG: 800 TABLET, FILM COATED ORAL at 05:00

## 2023-06-21 RX ADMIN — IBUPROFEN 800 MG: 800 TABLET, FILM COATED ORAL at 13:07

## 2023-06-21 RX ADMIN — Medication 10 ML: at 20:54

## 2023-06-21 RX ADMIN — ACETAMINOPHEN 1000 MG: 500 TABLET ORAL at 18:02

## 2023-06-21 RX ADMIN — FERROUS SULFATE TAB 325 MG (65 MG ELEMENTAL FE) 325 MG: 325 (65 FE) TAB at 09:23

## 2023-06-21 ASSESSMENT — PAIN - FUNCTIONAL ASSESSMENT: PAIN_FUNCTIONAL_ASSESSMENT: ACTIVITIES ARE NOT PREVENTED

## 2023-06-21 ASSESSMENT — PAIN DESCRIPTION - DESCRIPTORS: DESCRIPTORS: BURNING;SORE;CRAMPING

## 2023-06-21 ASSESSMENT — PAIN SCALES - GENERAL: PAINLEVEL_OUTOF10: 3

## 2023-06-21 ASSESSMENT — PAIN DESCRIPTION - LOCATION: LOCATION: ABDOMEN;INCISION

## 2023-06-21 NOTE — PROGRESS NOTES
Post Partum Progress Note    Subjective:  Veronica Orozco is a 29 y.o. [id number] status-post  uncomplicated . The pregnancy was complicated by  Rh (-) status, N/V, Hx of Ovarian CA (RSO on 19, chemotherapy, follows with Gubbi), Marginal Cord (resolved), Hx of PLTCS. Jackie Ch Patient is doing well with no concerns. Pain is well controlled with current regimen. Lochia mild. Tolerating regular diet without difficulty. Ambulating without difficulty, denies dizziness on standing. Voiding without difficulty. She is breast feeding. Denies chest pain, SOB, or leg pain. Objective:  BP (!) 93/50   Pulse 90   Temp 98.3 °F (36.8 °C) (Oral)   Resp 18   Ht 5' 2\" (1.575 m)   Wt 156 lb (70.8 kg)   LMP 2022 (Exact Date)   SpO2 98%   Breastfeeding Unknown   BMI 28.53 kg/m²     GENERAL APPEARANCE: alert, well appearing, in no apparent distress  LUNGS: clear to auscultation, no wheezes, rales or rhonchi, symmetric air entry  HEART: regular rate and rhythm  ABDOMEN POSTPARTUM: soft, appropriately tender, fundus firm at U, incision with dressing in place that is c/d/i  EXTREMITIES: no redness or tenderness in the calves or thighs, no edema    I/O last 3 completed shifts: In: 3764.7 [I.V.:3464.7; IV [id number]:427]  Out: 3844 [Urine:3725; Blood:316]    Pertinent Labs:   CBC:   Recent Labs     23  0630 23  0551   WBC 9.0 8.3   HGB 9.2* 7.6*   HCT 27.6* 23.3*    172     BMP:  No results for input(s): NA, K, CL, CO2, BUN, CREATININE, GLUCOSE in the last 72 hours. Hepatic: No results for input(s): AST, ALT, ALB, BILITOT, ALKPHOS in the last 72 hours. Mag:    No results for input(s): MG in the last 72 hours. Phos:   No results for input(s): PHOS in the last 72 hours. INR: No results for input(s): INR in the last 72 hours. Assessment/Plan:  Veronica Orozco is a 29 y.o. [id number] status-post  uncomplicated .      1. POD #1: doing well, routine care  - Hgb 7.6 this AM (9.2

## 2023-06-21 NOTE — LACTATION NOTE
This note was copied from a baby's chart. Lactation Progress Note      Data:   F/U with multip 1PO with breastfeeding and lactation rounds. MOB states that infant is doing better latching to both breast now that he is more alert, and feels he is starting to cluster feed. Frenulum was noted, but mob states latch is comfortable and denies nipple soreness, and reports infant with good output. MOB provided some EBM overnight for poor feedings, and was able to collect 3-5 mls in syringe to provide infant. Infant weight loss @ 2.96%  Urine x 10  Stool x  10    Action: Introduced self to patient as Lactation RN, name and phone number written on white board in room. BF education reinforced. Reviewed with mother what to expect over the next  24-48 hours with infant feedings, infant output, and breast care. Binder and breast feeding log reviewed, all questions answered. Mother instructed to call Lactation nurse for F/U care as needed. Response: MOB verbalizes understanding. Comfortable with breastfeeding at time of consult. Will call for f/u care as needed during her stay.

## 2023-06-21 NOTE — LACTATION NOTE
This note was copied from a baby's chart. Lactation Progress Note      Data:    Follow up consult for multip on DOS with an infant born at 38.3 weeks gestation. MOB breast fed her 1st for 6-8 months but struggled with re-occurring mastitis. MOB reports this baby fed well after delivery but has been sleepy since. MOB has a history of infertility, right ovarian epithelial cancer, & menopausal symptoms. Infant noted to be tongue tied. Feeding Method:  Breastfeeding  Urine output: established   Stool output: established  Percent weight change from birth:  0%    Action:    Introduced self & ensured name & lactation # is on whiteboard in room. MOB states it had been about 3 hours since last feed, suggested we wake him and try to get him latched, MOB agreeable. Took baby to basinet & woke, then took to mothers right breast in football position. Infant was able to get a SHAHEEN after several tries but was on & off the breast with suck bursts only for 5 minutes; no SRS. Reviewed tongue tie information & what to watch out for that may warrant a frenotomy. Reviewed breastfeeding education & infant feeding cues. Encouraged mother to allow infant to breast feed on demand anytime feeding cues are shown and if no feeding cues are shown to attempt to wake infant to feed every 2-3 hours. If infant is still too sleepy to latch to hand express colostrum into infants mouth for about ten minutes, then try again in 2-3 hours. After the first day of life to breast feed a minimum of 8-12 times a day per 24 hour period. Also encouraged mother to avoid giving infant a pacifier, bottle, or pump for at least the first two weeks of life or until breast feeding is well established. Encouraged good hydration, nutrition, and rest, and to keep taking prenatal vitamin while lactating. Encouraged much skin to skin between mother and infant and father and infant. Breast feeding log reviewed, all questions answered.  Mother instructed to call

## 2023-06-21 NOTE — ANESTHESIA POSTPROCEDURE EVALUATION
Department of Anesthesiology  Postprocedure Note    Patient: Ann Alejandre  MRN: [id number]  [place]: 1990  Date of evaluation: 2023      Procedure Summary     Date: 23 Room / Location: [facility] L&D OR [address] [place] [address]    Anesthesia Start: 6244 Anesthesia Stop: 3990    Procedure:  SECTION (Abdomen) Diagnosis:       Previous  section      ( [birth year])      (JADE 23)      (EGA 39.3)      ()      (Repeat w/ Salpingectomy)      ([id number])    Surgeons: Bobby Abraham DO Responsible Provider: Melvina Lopez MD    Anesthesia Type: Spinal ASA Status: 2 - Emergent          Anesthesia Type: Spinal    Rosa Phase I: Rosa Score: 9    Rosa Phase II:        Anesthesia Post Evaluation    Patient location during evaluation: bedside  Patient participation: complete - patient participated  Level of consciousness: awake and alert  Airway patency: patent  Nausea & Vomiting: no nausea and no vomiting  Complications: no  Cardiovascular status: hemodynamically stable  Respiratory status: nonlabored ventilation, spontaneous ventilation and room air  Hydration status: stable  Multimodal analgesia pain management approach

## 2023-06-21 NOTE — PLAN OF CARE
Problem: Vaginal Birth or  Section  Goal: Fetal and maternal status remain reassuring during the birth process  Description:  Birth OB-Pregnancy care plan goal which identifies if the fetal and maternal status remain reassuring during the birth process  2023 by Daniela Rushing RN  Outcome: Progressing  2023 by Trista Huston RN  Outcome: Progressing     Problem: Postpartum  Goal: Experiences normal postpartum course  Description:  Postpartum OB-Pregnancy care plan goal which identifies if the mother is experiencing a normal postpartum course  2023 by Daniela Rushing RN  Outcome: Progressing  2023 by Trista Huston RN  Outcome: Progressing  Goal: Appropriate maternal -  bonding  Description:  Postpartum OB-Pregnancy care plan goal which identifies if the mother and  are bonding appropriately  2023 by Daniela Rushing RN  Outcome: Progressing  2023 by Trista Huston RN  Outcome: Progressing  Goal: Establishment of infant feeding pattern  Description:  Postpartum OB-Pregnancy care plan goal which identifies if the mother is establishing a feeding pattern with their   2023 by Daniela Rushing RN  Outcome: Progressing  2023 by Trista Huston RN  Outcome: Progressing  Goal: Incisions, wounds, or drain sites healing without S/S of infection  2023 by Daniela Rusihng RN  Outcome: Progressing  2023 by Trista Huston RN  Outcome: Progressing     Problem: Pain  Goal: Verbalizes/displays adequate comfort level or baseline comfort level  2023 by Daniela Rushing RN  Outcome: Progressing  Flowsheets (Taken 2023 by Trista Huston RN)  Verbalizes/displays adequate comfort level or baseline comfort level:   Encourage patient to monitor pain and request assistance   Assess pain using appropriate pain scale   Administer analgesics based on type and severity of pain and

## 2023-06-22 VITALS
WEIGHT: 156 LBS | OXYGEN SATURATION: 98 % | HEIGHT: 62 IN | BODY MASS INDEX: 28.71 KG/M2 | HEART RATE: 90 BPM | SYSTOLIC BLOOD PRESSURE: 104 MMHG | TEMPERATURE: 98.1 F | RESPIRATION RATE: 16 BRPM | DIASTOLIC BLOOD PRESSURE: 57 MMHG

## 2023-06-22 PROCEDURE — 6370000000 HC RX 637 (ALT 250 FOR IP): Performed by: OBSTETRICS & GYNECOLOGY

## 2023-06-22 RX ORDER — DOCUSATE SODIUM 100 MG/1
100 CAPSULE, LIQUID FILLED ORAL 2 TIMES DAILY
Qty: 60 CAPSULE | Refills: 0 | Status: SHIPPED | OUTPATIENT
Start: 2023-06-22

## 2023-06-22 RX ORDER — HYDROCODONE BITARTRATE AND ACETAMINOPHEN 5; 325 MG/1; MG/1
1 TABLET ORAL EVERY 6 HOURS PRN
Qty: 10 TABLET | Refills: 0 | Status: SHIPPED | OUTPATIENT
Start: 2023-06-22 | End: 2023-06-25

## 2023-06-22 RX ORDER — IBUPROFEN 800 MG/1
800 TABLET ORAL EVERY 8 HOURS PRN
Qty: 30 TABLET | Refills: 0 | Status: SHIPPED | OUTPATIENT
Start: 2023-06-22

## 2023-06-22 RX ORDER — FERROUS SULFATE 325(65) MG
325 TABLET ORAL
Qty: 180 TABLET | Refills: 1 | Status: SHIPPED | OUTPATIENT
Start: 2023-06-22

## 2023-06-22 RX ORDER — SIMETHICONE 80 MG
80 TABLET,CHEWABLE ORAL EVERY 6 HOURS PRN
Status: DISCONTINUED | OUTPATIENT
Start: 2023-06-22 | End: 2023-06-22 | Stop reason: HOSPADM

## 2023-06-22 RX ADMIN — SIMETHICONE 80 MG: 80 TABLET, CHEWABLE ORAL at 05:00

## 2023-06-22 RX ADMIN — ACETAMINOPHEN 1000 MG: 500 TABLET ORAL at 09:20

## 2023-06-22 RX ADMIN — IBUPROFEN 800 MG: 800 TABLET, FILM COATED ORAL at 12:49

## 2023-06-22 RX ADMIN — FERROUS SULFATE TAB 325 MG (65 MG ELEMENTAL FE) 325 MG: 325 (65 FE) TAB at 08:31

## 2023-06-22 RX ADMIN — IBUPROFEN 800 MG: 800 TABLET, FILM COATED ORAL at 05:00

## 2023-06-22 RX ADMIN — ACETAMINOPHEN 1000 MG: 500 TABLET ORAL at 01:06

## 2023-06-22 RX ADMIN — DOCUSATE SODIUM 100 MG: 100 CAPSULE, LIQUID FILLED ORAL at 08:31

## 2023-06-22 ASSESSMENT — PAIN SCALES - GENERAL
PAINLEVEL_OUTOF10: 1
PAINLEVEL_OUTOF10: 3
PAINLEVEL_OUTOF10: 3

## 2023-06-22 ASSESSMENT — PAIN - FUNCTIONAL ASSESSMENT
PAIN_FUNCTIONAL_ASSESSMENT: ACTIVITIES ARE NOT PREVENTED
PAIN_FUNCTIONAL_ASSESSMENT: ACTIVITIES ARE NOT PREVENTED

## 2023-06-22 ASSESSMENT — PAIN DESCRIPTION - LOCATION
LOCATION: INCISION
LOCATION: BACK;SHOULDER;INCISION

## 2023-06-22 ASSESSMENT — PAIN DESCRIPTION - DESCRIPTORS
DESCRIPTORS: ACHING;CRAMPING
DESCRIPTORS: ACHING

## 2023-06-22 NOTE — FLOWSHEET NOTE
Discharge Phone Call    Patient Name: Jose Enrique Thao     [facility] Care Provider: Radhika Ayala DO Discharge Date: 2023    Disposition of baby:    Phone Number: [phone] (home)     Attempts to Contact:  Date:    Caller  Date:    Caller  Date:    Caller    Information for the patient's :  Rupert Diaz [[id number]]   Delivery Method: , Low Transverse     1. Now that you are at home is your pain being well controlled? Y/N   If no, instruct to call       provider. 2. Are you breastfeeding? Y/N    Do you need any extra support from our lactation staff? Y/N    If yes, provide number for lactation. 3. Have you made or already had your first appointment with the baby's doctor? Y/N   If no, do      you know when to schedule it? Y/N    4. Have you scheduled your follow-up appointment? Y/N  If no, do you know when to schedule       it? Y/N   If no, they can find it on printed discharge instructions. 5. Did staff discuss safe sleep during your stay? Y/N   6. Did we explain things in a way you could understand? Y/N  7. Were we respectful of your preferences for labor and birth and include you in the plan of       care? Y/N  If no, please explain _______________________________________________  8. Is there anyone in particular you would like to mention who provided care for you? _______      _________________________________________________________________________     9. Were you given a Post-Birth Warning Signs handout? Y/N  Do you have it somewhere      easily accessible? Y/N  If no, please send them a copy and ask them to put it somewhere      easily found. 10. Have you been crying excessively, having anger or mood swings that feel out of control, or       feel like you can't cope with caring for yourself or baby? Y/N   If yes, they may be showing       signs of postpartum depression and should call provider.  There is also a        depression test

## 2023-06-22 NOTE — PLAN OF CARE
Problem: Postpartum  Goal: Experiences normal postpartum course  Outcome: Progressing  Goal: Appropriate maternal -  bonding  Outcome: Progressing  Goal: Establishment of infant feeding pattern  Outcome: Progressing  Goal: Incisions, wounds, or drain sites healing without S/S of infection  Outcome: Progressing     Problem: Pain  Goal: Verbalizes/displays adequate comfort level or baseline comfort level  Outcome: Progressing     Problem: Infection - Adult  Goal: Absence of infection at discharge  Outcome: Progressing  Goal: Absence of infection during hospitalization  Outcome: Progressing     Problem: Safety - Adult  Goal: Free from fall injury  Outcome: Progressing     Problem: Discharge Planning  Goal: Discharge to home or other facility with appropriate resources  Outcome: Progressing     Problem: Chronic Conditions and Co-morbidities  Goal: Patient's chronic conditions and co-morbidity symptoms are monitored and maintained or improved  Outcome: Progressing

## 2023-06-22 NOTE — PLAN OF CARE
Problem: Postpartum  Goal: Experiences normal postpartum course  Description:  Postpartum OB-Pregnancy care plan goal which identifies if the mother is experiencing a normal postpartum course  2023 by Aliyah Fregoso RN  Outcome: Completed  2023 by Aliyah Fregoso RN  Outcome: Progressing  Goal: Appropriate maternal -  bonding  Description:  Postpartum OB-Pregnancy care plan goal which identifies if the mother and  are bonding appropriately  2023 by Aliyah Fregoso RN  Outcome: Completed  2023 by Aliyah Fregoso RN  Outcome: Progressing  Goal: Establishment of infant feeding pattern  Description:  Postpartum OB-Pregnancy care plan goal which identifies if the mother is establishing a feeding pattern with their   2023 by Aliyah Fregoso RN  Outcome: Completed  2023 by Aliyah Fregoso RN  Outcome: Progressing  Goal: Incisions, wounds, or drain sites healing without S/S of infection  2023 by Aliyah Fregoso RN  Outcome: Completed  2023 by Aliyah Fregoso RN  Outcome: Progressing

## 2023-06-22 NOTE — DISCHARGE INSTR - ACTIVITY
Call for increased pain, significant vaginal bleeding (soaking through 2 pads in < 1hr) or temperature greater than 101 degrees F. Nothing in vagina for 6 weeks (pelvic rest), including intercourse, tampons, toys, fingers. Advance activity as tolerated but limit lifting <10 lbs or weight of baby in carrier for first 2 weeks. Continue PNV. Take PRN medications as prescribed. FU in office in 2 weeks. Normal Diet     Please call with questions or concerns.    Reyes Patron, DO

## 2023-06-22 NOTE — DISCHARGE INSTRUCTIONS
minutes at a time for comfort. Do not express breast milk. Avoid stimulation to your breasts. When showering, allow the water to strike only your back. Wear a good fitting bra, such as a sports bra, until your milk dries up. OTHER REASONS TO CALL YOUR DOCTOR    Your abdomen is tender to touch or you have pain that cannot be relieved. Flu-like symptoms such as achy muscles and joints or you are experiencing extreme weakness or dizziness. Persistent burning or increased urgency in urination. LITERATURE PROVIDED    For Moms and Those who Care about Them. Your 's Healthy Start, [address]. Breastfeeding Best for [employer], Best for Mom. [address] Information about Universal Hearing Screening. Controlling Pain. I have received the educational material listed above,  [employer] has provided me with the opportunity to view instructional videos pertaining to infant care and the care of myself. I verify that I have received the above information and have no further questions and feel confident to care for myself and my baby. For more information about postpartum care or baby care and feeding, create a [facility] My Chart account, sign in and search using the magnifying glass and type in postpartum, breastfeeding or formula feeding. [email or web address]

## 2023-06-22 NOTE — DISCHARGE SUMMARY
Department of Obstetrics and Gynecology  Postpartum Discharge Summary      Admit Date: 2023    Admit Diagnosis: Previous  section [Z98.891]  IUGR (intrauterine growth restriction) affecting care of mother, third trimester, other fetus [O36.5939]    Discharge Date: 23    Condition at Discharge: good    Discharge Diagnoses: repeat C section + LS     Discharge Disposition:  Home    Service: Obstetrics    Postpartum complications: none     Hospital Course: uncomplicated     Data:  Information for the patient's :  John Blancs [[id number]]      Weight   Information for the patient's :  Guy Hyatt 950 [[id number]]      Apgars   Information for the patient's :  John Emms [[id number]]       Disposition of Baby:  Home with mother    FU in 2 weeks in office    Current Discharge Medication List        START taking these medications    Details   ferrous sulfate (IRON 325) 325 (65 Fe) MG tablet Take 1 tablet by mouth daily (with breakfast)  Qty: 180 tablet, Refills: 1      docusate sodium (COLACE) 100 MG capsule Take 1 capsule by mouth 2 times daily  Qty: 60 capsule, Refills: 0      ibuprofen (ADVIL;MOTRIN) 800 MG tablet Take 1 tablet by mouth every 8 hours as needed for Pain  Qty: 30 tablet, Refills: 0      HYDROcodone-acetaminophen (NORCO) 5-325 MG per tablet Take 1 tablet by mouth every 6 hours as needed for Pain for up to 3 days.  Max Daily Amount: 4 tablets  Qty: 10 tablet, Refills: 0    Comments: Reduce doses taken as pain becomes manageable  Associated Diagnoses: S/P repeat low transverse            CONTINUE these medications which have NOT CHANGED    Details   Prenatal Multivit-Min-Fe-FA (PRE- PO) Take by mouth daily             Electronically signed by Jaydon Yañez DO on 2023 at 10:01 AM

## 2023-06-22 NOTE — LACTATION NOTE
This note was copied from a baby's chart. Lactation Progress Note      Data:    Follow up consult for multip on day 1 po with an infant born at 38.3 weeks gestation. MOB breast fed her first for 6-8 months but had re-occurring mastitis (6 x). MOB reports this baby has been latching & feeing well, and cluster fed last night. MOB reports she is a little sore after cluster feeding but it's not bad. Infant does have a tongue tie. Parents have been educated about what to watch out for that may warrant a frenotomy. Feeding Method: Breastfeeding - mom comfortable with how latching is going, some cluster feeding overnight, mom reports comfortable latch and is able to revise latch to make deeper if shallow. [facility] worked with couplet a lot last night per mom. Urine output: established   Stool output: established  Percent weight change from birth:  -3%        Action:    Introduced self & ensured name & lactation # is on whiteboard in room. Reviewed breastfeeding education & infant feeding cues. Encouraged mother to allow infant to breast feed on demand anytime feeding cues are shown and if no feeding cues are shown to attempt to wake infant to feed every 2-3 hours with a minimum of 8-12 feedings a day per 24 hour period. Breast feeding log reviewed, all questions answered. Mother instructed to call lactation for F/U care as needed. Response:    MOB verbalized an understanding of education provided and will call for assistance as needed.

## 2023-06-22 NOTE — PLAN OF CARE
Problem: Vaginal Birth or  Section  Goal: Fetal and maternal status remain reassuring during the birth process  Description:  Birth OB-Pregnancy care plan goal which identifies if the fetal and maternal status remain reassuring during the birth process  [id number] by Shi Hubbard RN  Outcome: Completed

## 2023-06-22 NOTE — PROGRESS NOTES
Post Partum Progress Note    Subjective:  Katie Hernandez is a 29 y.o. [id number] status-post  uncomplicated . The pregnancy was complicated by  Rh (-) status, N/V, Hx of Ovarian CA (RSO on 19, chemotherapy, follows with Gubbi), Marginal Cord (resolved), Hx of PLTCS. Pushpa Ferris Patient is doing well with no concerns. Pain is well controlled with current regimen. Lochia mild. Tolerating regular diet without difficulty. Ambulating without difficulty, denies dizziness on standing. Voiding without difficulty. She is breast feeding. Denies chest pain, SOB, or leg pain. Objective:  BP (!) 104/57   Pulse 90   Temp 98.3 °F (36.8 °C) (Oral)   Resp 16   Ht 5' 2\" (1.575 m)   Wt 156 lb (70.8 kg)   LMP 2022 (Exact Date)   SpO2 98%   Breastfeeding Unknown   BMI 28.53 kg/m²     GENERAL APPEARANCE: alert, well appearing, in no apparent distress  LUNGS: clear to auscultation, no wheezes, rales or rhonchi, symmetric air entry  HEART: regular rate and rhythm  ABDOMEN POSTPARTUM: soft, appropriately tender, fundus firm at U, incision with dressing in place that is c/d/i  EXTREMITIES: no redness or tenderness in the calves or thighs, no edema    I/O last 3 completed shifts: In: 10 [I.V.:10]  Out: 1850 [Urine:1850]    Pertinent Labs:   CBC:   Recent Labs     23  0630 23  0551   WBC 9.0 8.3   HGB 9.2* 7.6*   HCT 27.6* 23.3*    172     BMP:  No results for input(s): NA, K, CL, CO2, BUN, CREATININE, GLUCOSE in the last 72 hours. Hepatic: No results for input(s): AST, ALT, ALB, BILITOT, ALKPHOS in the last 72 hours. Mag:    No results for input(s): MG in the last 72 hours. Phos:   No results for input(s): PHOS in the last 72 hours. INR: No results for input(s): INR in the last 72 hours. Assessment/Plan:  Katie Hernandez is a 29 y.o. [id number] status-post  uncomplicated . 1. POD #2: doing well, routine care  - Hgb 7.6 this AM (9.2 pre-op).  VSS, acute blood loss

## 2023-06-22 NOTE — LACTATION NOTE
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 ([facility] [address] ) - Primary     Diabetes  Current A1c in the office has improved to 6 4  He will continue with current regimen of medications with the exception of discontinuing his glimepiride since patient would like to decrease his medicine load  We will recheck blood work in 3 months  His foot exam is up-to-date  Lab Results   Component Value Date    HGBA1C 6 4 2020            Relevant Orders    POCT hemoglobin A1c (Completed)    Hemoglobin A1C    Comprehensive metabolic panel    Lipid panel    TSH, 3rd generation    POCT urine microalbumin/creatinine       Cardiovascular and Mediastinum    Resistant hypertension     Hypertension  Patient blood pressure is stable at this time he will continue with current regimen of medications         Relevant Orders    Hemoglobin A1C    Comprehensive metabolic panel    Lipid panel    TSH, 3rd generation       Other    Hypertriglyceridemia    Relevant Orders    Hemoglobin A1C    Comprehensive metabolic panel    Lipid panel    TSH, 3rd generation      Other Visit Diagnoses     Insomnia, unspecified type        Relevant Medications    traZODone (DESYREL) 100 mg tablet              Chief Complaint     Chief Complaint   Patient presents with   [facility] Annual Exam       History of Present Illness     This is the 1st office visit for patient who recently moved from the [place] area  He has been trying to be more mindful of his diet for his diabetes  He also walks approximately a miles a day working in a large [employer] for [employer]  He denies any recent illness    I reviewed his most recent medicine list   His last A1c was 7 1      Review of Systems This note was copied from a baby's chart. Lactation Progress Note      Data:     F/U on multip breast feeder who will be d/c home today. Mob states that baby has been feeding well and that her milk is in. Output and weight loss are WNL. Action: Discharge teaching done; what to expect in the first few days of life, to feed baby at first sign of hunger cue for total of 8-12 times per day after the first DOL, how to properly position and latch baby, how to know baby is getting enough, engorgement prevention and treatment, avoiding bottles and pacifiers, community resources, pumping and return to work. Reviewed tips for reducing risk of plugged ducts and mastitis since mob had a history of both. Encouraged to call [de-identified] or Outpatient [facility] clinic for f/u prn. Response: Verbalized understanding and  comfortable with breast feeding for d/c. 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 (HCC)    YUDY (generalized anxiety disorder)       Past Medical History:  No past medical history on file      Past Surgical History:  Past Surgical History:   Procedure Laterality Date    TONSILLECTOMY      WISDOM TOOTH EXTRACTION         Family History:  Family History   Problem Relation Age of Onset    Hypotension Mother     Diabetes Father     Hyperlipidemia Father     Alzheimer's disease Maternal Grandmother     Stroke Maternal Grandmother     Lymphoma Paternal Grandfather        Social History:  Social History     Socioeconomic History    Marital status: Single     Spouse name: Not on file    Number of children: Not on file    Years of education: Not on file    Highest education level: Not on file   Occupational History    Not on file   Social Needs    Financial resource strain: Not on file    Food insecurity     Worry: Not on file     Inability: Not on file    Transportation needs     Medical: Not on file     Non-medical: Not on file   Tobacco Use    Smoking status: Never Smoker    Smokeless tobacco: Never Used   Substance and Sexual Activity    Alcohol use: Yes     Frequency: Monthly or less    Drug use: Never    Sexual activity: Yes     Partners: Female   Lifestyle    Physical activity     Days per week: Not on file     Minutes per session: Not on file    Stress: Not on file   Relationships    Social connections     Talks on phone: Not on file     Gets together: Not on file     Attends Ep[employer]alian service: Not on file     Active member of club or organization: Not on file     Attends meetings of clubs or organizations: Not on file     Relationship status: Not on file    Intimate partner violence     Fear of current or ex partner: Not on file     Emotionally abused: Not on file     Physically abused: Not on file     Forced sexual activity: Not on file   Other Topics Concern    Not on file   Social History Narrative    Not on file       Objective     Vitals:    09/30/20 0721   BP: 130/80   Pulse:    Resp:    Temp:    SpO2:      Wt Readings from Last 3 Encounters:   09/29/20 122 kg (270 lb)   06/04/20 124 kg (273 lb)   03/06/20 126 kg (278 lb 12 8 oz)       Physical Exam  Constitutional:       General: He is not in acute distress  Appearance: Normal appearance  He is not ill-appearing  Eyes:      Extraocular Movements: Extraocular movements intact  Conjunctiva/sclera: Conjunctivae normal       Pupils: Pupils are equal, round, and reactive to light  Neck:      Vascular: No carotid bruit  Cardiovascular:      Pulses: no weak pulses          Dorsalis pedis pulses are 2+ on the right side and 2+ on the left side  Posterior tibial pulses are 2+ on the right side and 2+ on the left side  Heart sounds: Normal heart sounds  Comments: Regular rate and rhythm with no murmurs  Pulmonary:      Comments: Lungs are clear to auscultation without wheezes,rales, or rhonchi  Musculoskeletal:      Right lower leg: No edema  Left lower leg: No edema  Feet:      Right foot:      Skin integrity: No ulcer, skin breakdown, erythema, warmth, callus or dry skin  Left foot:      Skin integrity: No ulcer, skin breakdown, erythema, warmth, callus or dry skin  Lymphadenopathy:      Cervical: No cervical adenopathy  Neurological:      General: No focal deficit present  Mental Status: He is alert and oriented to person, place, and time     Psychiatric:         Mood and Affect: Mood normal  Behavior: Behavior normal          Thought Content: Thought content normal          Judgment: Judgment normal      Patient's shoes and socks removed  Right Foot/Ankle   Right Foot Inspection  Skin Exam: skin normal and skin intact no dry skin, no warmth, no callus, no erythema, no maceration, no abnormal color, no pre-ulcer, no ulcer and no callus                          Toe Exam: ROM and strength within normal limits  Sensory     Proprioception: intact   Monofilament testing: intact  Vascular    The right DP pulse is 2+  The right PT pulse is 2+  Left Foot/Ankle  Left Foot Inspection  Skin Exam: skin normal and skin intactno dry skin, no warmth, no erythema, no maceration, normal color, no pre-ulcer, no ulcer and no callus                         Toe Exam: ROM and strength within normal limits                   Sensory     Proprioception: intact  Monofilament: intact  Vascular    The left DP pulse is 2+  The left PT pulse is 2+  Assign Risk Category:  No deformity present; No loss of protective sensation;  No weak pulses       Risk: 0        Pertinent Laboratory/Diagnostic Studies:  Lab Results   Component Value Date    BUN 21 06/02/2020    CREATININE 0 88 06/02/2020    CALCIUM 9 3 06/02/2020    K 3 5 06/02/2020    CO2 25 06/02/2020     06/02/2020     Lab Results   Component Value Date    ALT 54 06/02/2020    AST 25 06/02/2020    ALKPHOS 42 (L) 06/02/2020       Lab Results   Component Value Date    WBC 7 17 06/02/2020    HGB 17 8 (H) 06/02/2020    HCT 55 3 (H) 06/02/2020    MCV 84 06/02/2020     06/02/2020       No results found for: TSH    No results found for: CHOL  Lab Results   Component Value Date    TRIG 274 (H) 06/02/2020     Lab Results   Component Value Date    HDL 30 (L) 06/02/2020     Lab Results   Component Value Date    LDLCALC 85 06/02/2020     Lab Results   Component Value Date    HGBA1C 6 4 09/29/2020       Results for orders placed or performed in visit on 09/29/20   POCT hemoglobin A1c   Result Value Ref Range    Hemoglobin A1C 6 4 6 5       Orders Placed This Encounter   Procedures    Hemoglobin A1C    Comprehensive metabolic panel    Lipid panel    TSH, 3rd generation    POCT hemoglobin A1c    POCT urine microalbumin/creatinine       ALLERGIES:  Allergies   Allergen Reactions    Amoxicillin Hives       Current Medications     Current Outpatient Medications   Medication Sig Dispense Refill    ALPRAZolam (XANAX) 0 5 mg tablet Take 1 tablet (0 5 mg total) by mouth daily at bedtime as needed for anxiety 30 tablet 0    amLODIPine (NORVASC) 10 mg tablet TAKE 1 TABLET BY MOUTH EVERY DAY 90 tablet 1    Ascorbic Acid (VITAMIN C) 1000 MG tablet Take 1,000 mg by mouth      Blood Glucose Monitoring Suppl (OneTouch Verio Flex System) w/Device KIT by Does not apply route daily 1 kit 0    fenofibrate (TRICOR) 145 mg tablet TAKE 1 TABLET BY MOUTH EVERY DAY 90 tablet 0    glimepiride (AMARYL) 4 mg tablet TAKE 1 TABLET BY MOUTH DAILY WITH BREAKFAST 90 tablet 1    glucose blood (OneTouch Verio) test strip TEST ONCE DAILY  100 each 5    glucose blood test strip Test blood sugar once daily  100 each 1    glucose monitoring kit (FREESTYLE) monitoring kit Test blood sugar once daily   1 each 0    Multiple Vitamin (MULTIVITAMINS PO) Take by mouth daily      OneTouch Delica Lancets 81H MISC Test once daily 100 each 5    rosuvastatin (CRESTOR) 40 MG tablet TAKE 1 TABLET BY MOUTH EVERY DAY 90 tablet 0    traZODone (DESYREL) 50 mg tablet TAKE 1 TABLET BY MOUTH DAILY AT BEDTIME 30 tablet 0    triamterene-hydrochlorothiazide (MAXZIDE) 75-50 MG per tablet Take 1 tablet by mouth daily 90 tablet 1    Trulicity 1 5 ZL/9 5TZ SOPN INJECT 0 5 ML (1 5 MG TOTAL) UNDER THE SKIN ONCE A WEEK 2 pen 1    traZODone (DESYREL) 100 mg tablet Take 1 tablet (100 mg total) by mouth daily at bedtime 90 tablet 3    XIGDUO XR 5-1000 MG TB24 Take 2 tablets by mouth daily 180 tablet 0     No current facility-administered medications for this visit            Health Maintenance     Health Maintenance   Topic Date Due    Pneumococcal Vaccine: Pediatrics (0 to 5 Years) and At-Risk Patients (6 to 59 Years) (1 of 1 - PPSV23) 07/16/1993    HIV Screening  07/16/2002    Annual Physical  07/16/2005    DTaP,Tdap,and Td Vaccines (7 - Td) 06/30/2018    BMI: Followup Plan  03/07/2021    Influenza Vaccine  04/30/2021 (Originally 7/1/2020)    HEMOGLOBIN A1C  03/29/2021    URINE MICROALBUMIN  06/02/2021    Diabetic Foot Exam  06/04/2021    DM Eye Exam  07/30/2021    Depression Screening PHQ  09/29/2021    BMI: Adult  09/29/2021    HIB Vaccine  Completed    Hepatitis B Vaccine  Completed    IPV Vaccine  Completed    Hepatitis A Vaccine  Aged Out    Meningococcal ACWY Vaccine  Aged Out    HPV Vaccine  Aged Out     Immunization History   Administered Date(s) Administered    DTP 1985, 1985, 01/11/1988, 05/22/1989    Hep B, Adolescent or Pediatric 08/10/1993, 02/22/1994, 08/15/1994    HiB 01/02/1990, 05/04/1992    Influenza TIV (IM) 11/16/2018    MMR 12/12/1988, 06/11/1998    OPV 1987, 1987, 05/22/1989, 06/11/1998    Pneumococcal Conjugate PCV 7 09/08/2009    Pneumococcal Polysaccharide PPV23 09/08/2009    Td (adult), adsorbed 06/11/1998    Tdap 30/39/2786       Tom Butt MD

## 2023-07-05 ENCOUNTER — POSTPARTUM VISIT (OUTPATIENT)
Dept: OBGYN CLINIC | Age: 33
End: 2023-07-05

## 2023-07-05 VITALS
WEIGHT: 141 LBS | BODY MASS INDEX: 25.95 KG/M2 | SYSTOLIC BLOOD PRESSURE: 120 MMHG | HEART RATE: 106 BPM | OXYGEN SATURATION: 97 % | DIASTOLIC BLOOD PRESSURE: 80 MMHG | HEIGHT: 62 IN

## 2023-07-05 DIAGNOSIS — O43.199 MARGINAL INSERTION OF UMBILICAL CORD AFFECTING MANAGEMENT OF MOTHER: ICD-10-CM

## 2023-07-05 DIAGNOSIS — Z98.891 HISTORY OF CESAREAN SECTION: ICD-10-CM

## 2023-07-05 DIAGNOSIS — N89.8 VAGINAL DISCHARGE: ICD-10-CM

## 2023-07-05 DIAGNOSIS — C56.1 RIGHT OVARIAN EPITHELIAL CANCER (HCC): ICD-10-CM

## 2023-07-05 DIAGNOSIS — O36.5939 IUGR (INTRAUTERINE GROWTH RESTRICTION) AFFECTING CARE OF MOTHER, THIRD TRIMESTER, OTHER FETUS: ICD-10-CM

## 2023-07-05 DIAGNOSIS — Z67.91 BLOOD TYPE, RH NEGATIVE: ICD-10-CM

## 2023-07-05 DIAGNOSIS — Z90.79 HISTORY OF BILATERAL SALPINGECTOMY: ICD-10-CM

## 2023-07-05 DIAGNOSIS — Z98.891 S/P REPEAT LOW TRANSVERSE C-SECTION: ICD-10-CM

## 2023-07-05 PROCEDURE — 0503F POSTPARTUM CARE VISIT: CPT | Performed by: OBSTETRICS & GYNECOLOGY

## 2023-07-05 SDOH — ECONOMIC STABILITY: HOUSING INSECURITY
IN THE LAST 12 MONTHS, WAS THERE A TIME WHEN YOU DID NOT HAVE A STEADY PLACE TO SLEEP OR SLEPT IN A SHELTER (INCLUDING NOW)?: NO

## 2023-07-05 SDOH — ECONOMIC STABILITY: INCOME INSECURITY: HOW HARD IS IT FOR YOU TO PAY FOR THE VERY BASICS LIKE FOOD, HOUSING, MEDICAL CARE, AND HEATING?: NOT HARD AT ALL

## 2023-07-05 SDOH — ECONOMIC STABILITY: FOOD INSECURITY: WITHIN THE PAST 12 MONTHS, YOU WORRIED THAT YOUR FOOD WOULD RUN OUT BEFORE YOU GOT MONEY TO BUY MORE.: NEVER TRUE

## 2023-07-05 SDOH — ECONOMIC STABILITY: FOOD INSECURITY: WITHIN THE PAST 12 MONTHS, THE FOOD YOU BOUGHT JUST DIDN'T LAST AND YOU DIDN'T HAVE MONEY TO GET MORE.: NEVER TRUE

## 2023-07-05 NOTE — PROGRESS NOTES
physiologic discharge. Cervix without polyps or masses. Uterus mobile and midline without masses. Adnexa palpated bilaterally without masses or tenderness. Bimanual examination without masses or tenderness. Exam chaperoned by female assistant. Incision well healed / clean, dry and intact. Extremities: No redness or tenderness, neg Lena's sign  Osteopathic: no TART changes    Assessment/Plan:   Diagnosis Orders   1. Encounter for postpartum visit        2. S/P repeat low transverse         3. History of bilateral salpingectomy        4. Vaginal discharge  VAGINAL PATHOGENS PROBE *A      5. Marginal insertion of umbilical cord affecting management of mother        6. IUGR (intrauterine growth restriction) affecting care of mother, third trimester, other fetus        9. History of  section        8. Blood type, Rh negative        9. Right ovarian epithelial cancer ([address])           Post Partum: doing well, meeting PP milestones, CS incision well healed, remaining steri-strips removed today   Family Planning: s/p BS -- doing well   Vaginal DC: swabs pending, will tx as needed   Health Maintenance: Last pap smear: 22 -- NILM HPV neg, annual in 2023     Follow Up:   Return in about 4 weeks (around 2023) for Post Partum Visit.      Delores aBrry,

## 2023-07-06 LAB
CANDIDA DNA VAG QL NAA+PROBE: NORMAL
G VAGINALIS DNA SPEC QL NAA+PROBE: NORMAL
T VAGINALIS DNA VAG QL NAA+PROBE: NORMAL

## 2023-07-15 ENCOUNTER — TELEPHONE (OUTPATIENT)
Dept: OBGYN CLINIC | Age: 33
End: 2023-07-15

## 2023-07-15 RX ORDER — CLINDAMYCIN HYDROCHLORIDE 300 MG/1
300 CAPSULE ORAL 3 TIMES DAILY
Qty: 21 CAPSULE | Refills: 0 | Status: SHIPPED | OUTPATIENT
Start: 2023-07-15 | End: 2023-07-22

## 2023-07-15 NOTE — TELEPHONE ENCOUNTER
Received call through answering service regarding a painful mass in her breast while breastfeeding. Reports surrounding erythema. Has had a subjective fever and general malaise. Allergies to PCN. Has had Clindamycin in the past as she frequently experienced mastitis after her first pregnancy. Rx for Clindamycin called to pharmacy. ED precautions reviewed. Continue breast feeding. Follow-up in office if lump remains after antibiotic treatment.

## 2023-08-03 ENCOUNTER — POSTPARTUM VISIT (OUTPATIENT)
Dept: OBGYN CLINIC | Age: 33
End: 2023-08-03
Payer: COMMERCIAL

## 2023-08-03 VITALS
HEART RATE: 83 BPM | BODY MASS INDEX: 25.3 KG/M2 | OXYGEN SATURATION: 98 % | SYSTOLIC BLOOD PRESSURE: 98 MMHG | DIASTOLIC BLOOD PRESSURE: 86 MMHG | TEMPERATURE: 97.3 F | WEIGHT: 138.3 LBS

## 2023-08-03 DIAGNOSIS — Z67.91 BLOOD TYPE, RH NEGATIVE: ICD-10-CM

## 2023-08-03 DIAGNOSIS — B37.9 YEAST INFECTION: ICD-10-CM

## 2023-08-03 DIAGNOSIS — Z90.79 HISTORY OF BILATERAL SALPINGECTOMY: ICD-10-CM

## 2023-08-03 DIAGNOSIS — O43.199 MARGINAL INSERTION OF UMBILICAL CORD AFFECTING MANAGEMENT OF MOTHER: ICD-10-CM

## 2023-08-03 DIAGNOSIS — M54.50 ACUTE BILATERAL LOW BACK PAIN WITHOUT SCIATICA: ICD-10-CM

## 2023-08-03 DIAGNOSIS — Z91.89 AT RISK FOR POSTPARTUM DEPRESSION: ICD-10-CM

## 2023-08-03 DIAGNOSIS — C56.1 RIGHT OVARIAN EPITHELIAL CANCER (HCC): ICD-10-CM

## 2023-08-03 DIAGNOSIS — Z98.891 S/P REPEAT LOW TRANSVERSE C-SECTION: ICD-10-CM

## 2023-08-03 PROCEDURE — 81003 URINALYSIS AUTO W/O SCOPE: CPT | Performed by: OBSTETRICS & GYNECOLOGY

## 2023-08-03 PROCEDURE — 0503F POSTPARTUM CARE VISIT: CPT | Performed by: OBSTETRICS & GYNECOLOGY

## 2023-08-03 RX ORDER — FLUCONAZOLE 150 MG/1
150 TABLET ORAL ONCE
Qty: 1 TABLET | Refills: 0 | Status: SHIPPED | OUTPATIENT
Start: 2023-08-03 | End: 2023-08-03

## 2023-08-04 LAB
BACTERIA URNS QL MICRO: ABNORMAL /HPF
BILIRUB UR QL STRIP.AUTO: NEGATIVE
CLARITY UR: CLEAR
COLOR UR: YELLOW
EPI CELLS #/AREA URNS AUTO: 6 /HPF (ref 0–5)
GLUCOSE UR STRIP.AUTO-MCNC: NEGATIVE MG/DL
HGB UR QL STRIP.AUTO: NEGATIVE
HYALINE CASTS #/AREA URNS AUTO: 0 /LPF (ref 0–8)
KETONES UR STRIP.AUTO-MCNC: NEGATIVE MG/DL
LEUKOCYTE ESTERASE UR QL STRIP.AUTO: ABNORMAL
NITRITE UR QL STRIP.AUTO: NEGATIVE
PH UR STRIP.AUTO: 6 [PH] (ref 5–8)
PROT UR STRIP.AUTO-MCNC: NEGATIVE MG/DL
RBC CLUMPS #/AREA URNS AUTO: 1 /HPF (ref 0–4)
SP GR UR STRIP.AUTO: 1.02 (ref 1–1.03)
UA DIPSTICK W REFLEX MICRO PNL UR: YES
URN SPEC COLLECT METH UR: ABNORMAL
UROBILINOGEN UR STRIP-ACNC: 0.2 E.U./DL
WBC #/AREA URNS AUTO: 30 /HPF (ref 0–5)

## 2023-08-05 LAB — BACTERIA UR CULT: NORMAL

## 2023-08-29 ENCOUNTER — TELEPHONE (OUTPATIENT)
Dept: OBGYN CLINIC | Age: 33
End: 2023-08-29

## 2023-08-29 NOTE — TELEPHONE ENCOUNTER
Pt calling to check status of [facility] paperwork. She is asking that we call her with a time frame for completion tomorrow.  Please advise

## 2023-11-30 ENCOUNTER — TELEPHONE (OUTPATIENT)
Dept: OBGYN CLINIC | Age: 33
End: 2023-11-30

## 2023-11-30 NOTE — TELEPHONE ENCOUNTER
Pt OB PP. She is calling with concern for rash/ hives. She says she was sitting on the couch and started itching and scratching. She says she has a red raised rash on her left upper forearm and it is spreading to the wrist and hand. She has not used any new lotions, deodorants etc, no new foods and does not recall any contact with any contaminant. She is unsure if it may be a bite. She says she is breastfeeding and unsure if she could take Benadryl. Pt says she has topical hydrocortisone and was advised to apply for now. Advised to monitor. Pt given ED precautions for swelling to face, throat and SOB.  Please advise

## 2024-10-18 ENCOUNTER — OFFICE VISIT (OUTPATIENT)
Dept: OBGYN CLINIC | Age: 34
End: 2024-10-18
Payer: COMMERCIAL

## 2024-10-18 VITALS
HEART RATE: 140 BPM | DIASTOLIC BLOOD PRESSURE: 70 MMHG | OXYGEN SATURATION: 99 % | WEIGHT: 123.8 LBS | BODY MASS INDEX: 22.64 KG/M2 | SYSTOLIC BLOOD PRESSURE: 116 MMHG

## 2024-10-18 DIAGNOSIS — C56.1 RIGHT OVARIAN EPITHELIAL CANCER (HCC): ICD-10-CM

## 2024-10-18 DIAGNOSIS — Z12.4 PAP SMEAR FOR CERVICAL CANCER SCREENING: ICD-10-CM

## 2024-10-18 DIAGNOSIS — Z87.59 HX OF ONE MISCARRIAGE: ICD-10-CM

## 2024-10-18 DIAGNOSIS — Z98.891 HISTORY OF 2 CESAREAN SECTIONS: ICD-10-CM

## 2024-10-18 DIAGNOSIS — Z01.419 WOMEN'S ANNUAL ROUTINE GYNECOLOGICAL EXAMINATION: Primary | ICD-10-CM

## 2024-10-18 DIAGNOSIS — Z90.79 HISTORY OF BILATERAL SALPINGECTOMY: ICD-10-CM

## 2024-10-18 DIAGNOSIS — N93.9 ABNORMAL UTERINE BLEEDING (AUB): ICD-10-CM

## 2024-10-18 PROCEDURE — G8484 FLU IMMUNIZE NO ADMIN: HCPCS | Performed by: OBSTETRICS & GYNECOLOGY

## 2024-10-18 PROCEDURE — 99395 PREV VISIT EST AGE 18-39: CPT | Performed by: OBSTETRICS & GYNECOLOGY

## 2024-10-18 NOTE — PROGRESS NOTES
[facility] Ob/[facility]   Annual Exam        CC:   Chief Complaint   Patient presents with    Annual Exam     Abnormal bleeding. Every other week since        HPI:  33 y.o.  presents for her gynecologic annual exam.  Is doing well today. Does admit to abnormal bleeding.   Hx of irregular heavier periods   States she is having periods almost every other week, also bleeds variably   Heavy (Tampon every other day).   Has tried pills, nuvaring, mirena in the past (migrated).   I removed her tubes at her last c section. Interested in more definitive therapy.     Health Maintenance:  Safe Enviroment: y  Sexually Active: y   n sexual problems  Contraception: BL salpingectomy     Screening:  Last pap smear:  NILM / HPV (-)  History of abnormal pap smears: Denies   STI History: denies       Review of Systems:   Constitutional: Negative for appetite change, chills and fever.   HENT: Negative for congestion, sneezing and sore throat.    Eyes: Negative for visual disturbance.   Respiratory: Negative for chest tightness, shortness of breath and wheezing.    Cardiovascular: Negative for chest pain, palpitations and leg swelling.   Gastrointestinal: Negative for abdominal pain, diarrhea, nausea and vomiting.   Endocrine: Negative for heat intolerance.   Genitourinary: Negative for difficulty urinating, dyspareunia, dysuria, menstrual problem and pelvic pain.    See above   Musculoskeletal: Negative for back pain, neck pain and neck stiffness.   Skin: Negative for color change, pallor and rash.   Allergic/Immunologic: Negative for environmental allergies, food allergies and immunocompromised state.   Neurological: Negative for light-headedness, numbness and headaches.   Hematological: Does not bruise/bleed easily.   Psychiatric/Behavioral: Negative for behavioral problems, sleep disturbance and suicidal ideas.     Primary Care Physician: No primary care provider on file.    Obstetric History  OB History    Para Term

## 2024-10-21 LAB
HPV HR 12 DNA SPEC QL NAA+PROBE: NOT DETECTED
HPV16 DNA SPEC QL NAA+PROBE: NOT DETECTED
HPV16+18+H RISK 12 DNA SPEC-IMP: NORMAL
HPV18 DNA SPEC QL NAA+PROBE: NOT DETECTED

## 2024-11-17 PROBLEM — Z01.419 WOMEN'S ANNUAL ROUTINE GYNECOLOGICAL EXAMINATION: Status: RESOLVED | Noted: 2024-10-18 | Resolved: 2024-11-17

## 2024-11-17 PROBLEM — Z12.4 PAP SMEAR FOR CERVICAL CANCER SCREENING: Status: RESOLVED | Noted: 2024-10-18 | Resolved: 2024-11-17

## 2024-11-18 ENCOUNTER — PATIENT MESSAGE (OUTPATIENT)
Dept: OBGYN CLINIC | Age: 34
End: 2024-11-18

## 2024-11-18 RX ORDER — FLUCONAZOLE 150 MG/1
TABLET ORAL
Qty: 2 TABLET | Refills: 1 | Status: SHIPPED | OUTPATIENT
Start: 2024-11-18

## 2024-11-18 SDOH — ECONOMIC STABILITY: TRANSPORTATION INSECURITY
IN THE PAST 12 MONTHS, HAS LACK OF TRANSPORTATION KEPT YOU FROM MEETINGS, WORK, OR FROM GETTING THINGS NEEDED FOR DAILY LIVING?: NO

## 2024-11-18 SDOH — ECONOMIC STABILITY: FOOD INSECURITY: WITHIN THE PAST 12 MONTHS, YOU WORRIED THAT YOUR FOOD WOULD RUN OUT BEFORE YOU GOT MONEY TO BUY MORE.: NEVER TRUE

## 2024-11-18 SDOH — ECONOMIC STABILITY: FOOD INSECURITY: WITHIN THE PAST 12 MONTHS, THE FOOD YOU BOUGHT JUST DIDN'T LAST AND YOU DIDN'T HAVE MONEY TO GET MORE.: NEVER TRUE

## 2024-11-18 SDOH — ECONOMIC STABILITY: INCOME INSECURITY: HOW HARD IS IT FOR YOU TO PAY FOR THE VERY BASICS LIKE FOOD, HOUSING, MEDICAL CARE, AND HEATING?: SOMEWHAT HARD

## 2024-11-18 ASSESSMENT — PATIENT HEALTH QUESTIONNAIRE - PHQ9
1. LITTLE INTEREST OR PLEASURE IN DOING THINGS: NOT AT ALL
SUM OF ALL RESPONSES TO PHQ QUESTIONS 1-9: 0
SUM OF ALL RESPONSES TO PHQ QUESTIONS 1-9: 0
SUM OF ALL RESPONSES TO PHQ9 QUESTIONS 1 & 2: 0
SUM OF ALL RESPONSES TO PHQ QUESTIONS 1-9: 0
SUM OF ALL RESPONSES TO PHQ9 QUESTIONS 1 & 2: 0
2. FEELING DOWN, DEPRESSED OR HOPELESS: NOT AT ALL
SUM OF ALL RESPONSES TO PHQ QUESTIONS 1-9: 0
2. FEELING DOWN, DEPRESSED OR HOPELESS: NOT AT ALL
1. LITTLE INTEREST OR PLEASURE IN DOING THINGS: NOT AT ALL

## 2024-11-21 ENCOUNTER — OFFICE VISIT (OUTPATIENT)
Dept: OBGYN CLINIC | Age: 34
End: 2024-11-21
Payer: COMMERCIAL

## 2024-11-21 VITALS
HEART RATE: 78 BPM | WEIGHT: 124.6 LBS | DIASTOLIC BLOOD PRESSURE: 60 MMHG | SYSTOLIC BLOOD PRESSURE: 104 MMHG | BODY MASS INDEX: 22.79 KG/M2

## 2024-11-21 DIAGNOSIS — Z85.43 PERSONAL HISTORY OF OVARIAN CANCER: ICD-10-CM

## 2024-11-21 DIAGNOSIS — C56.1 RIGHT OVARIAN EPITHELIAL CANCER (HCC): ICD-10-CM

## 2024-11-21 DIAGNOSIS — N76.1 SUBACUTE VAGINITIS: ICD-10-CM

## 2024-11-21 DIAGNOSIS — N83.202 LEFT OVARIAN CYST: ICD-10-CM

## 2024-11-21 DIAGNOSIS — Z98.891 HISTORY OF 2 CESAREAN SECTIONS: ICD-10-CM

## 2024-11-21 DIAGNOSIS — N93.9 ABNORMAL UTERINE BLEEDING (AUB): ICD-10-CM

## 2024-11-21 DIAGNOSIS — Z90.79 HISTORY OF BILATERAL SALPINGECTOMY: ICD-10-CM

## 2024-11-21 DIAGNOSIS — Z09 FOLLOW-UP EXAM: Primary | ICD-10-CM

## 2024-11-21 DIAGNOSIS — R10.2 PELVIC PAIN: ICD-10-CM

## 2024-11-21 DIAGNOSIS — Z90.721 HISTORY OF RIGHT OOPHORECTOMY: ICD-10-CM

## 2024-11-21 DIAGNOSIS — Z87.59 HX OF ONE MISCARRIAGE: ICD-10-CM

## 2024-11-21 PROBLEM — O43.199 MARGINAL INSERTION OF UMBILICAL CORD AFFECTING MANAGEMENT OF MOTHER: Status: RESOLVED | Noted: 2023-02-16 | Resolved: 2024-11-21

## 2024-11-21 PROBLEM — Z67.91 BLOOD TYPE, RH NEGATIVE: Status: RESOLVED | Noted: 2022-12-30 | Resolved: 2024-11-21

## 2024-11-21 PROBLEM — R55 PRE-SYNCOPE: Status: RESOLVED | Noted: 2020-12-26 | Resolved: 2024-11-21

## 2024-11-21 PROBLEM — O26.893 VAGINAL DISCHARGE DURING PREGNANCY IN THIRD TRIMESTER: Status: RESOLVED | Noted: 2023-04-27 | Resolved: 2024-11-21

## 2024-11-21 PROBLEM — R79.89 ELEVATED TSH: Status: RESOLVED | Noted: 2023-04-27 | Resolved: 2024-11-21

## 2024-11-21 PROBLEM — N89.8 VAGINAL DISCHARGE DURING PREGNANCY IN THIRD TRIMESTER: Status: RESOLVED | Noted: 2023-04-27 | Resolved: 2024-11-21

## 2024-11-21 PROBLEM — O36.5939 IUGR (INTRAUTERINE GROWTH RESTRICTION) AFFECTING CARE OF MOTHER, THIRD TRIMESTER, OTHER FETUS: Status: RESOLVED | Noted: 2023-06-20 | Resolved: 2024-11-21

## 2024-11-21 PROBLEM — Z34.83 PRENATAL CARE, SUBSEQUENT PREGNANCY IN THIRD TRIMESTER: Status: RESOLVED | Noted: 2023-01-05 | Resolved: 2024-11-21

## 2024-11-21 PROBLEM — O02.1 MISSED AB: Status: RESOLVED | Noted: 2023-04-27 | Resolved: 2024-11-21

## 2024-11-21 PROBLEM — O09.93 HIGH-RISK PREGNANCY IN THIRD TRIMESTER: Status: RESOLVED | Noted: 2023-04-27 | Resolved: 2024-11-21

## 2024-11-21 PROCEDURE — G8484 FLU IMMUNIZE NO ADMIN: HCPCS | Performed by: OBSTETRICS & GYNECOLOGY

## 2024-11-21 PROCEDURE — G8420 CALC BMI NORM PARAMETERS: HCPCS | Performed by: OBSTETRICS & GYNECOLOGY

## 2024-11-21 PROCEDURE — G8427 DOCREV CUR MEDS BY ELIG CLIN: HCPCS | Performed by: OBSTETRICS & GYNECOLOGY

## 2024-11-21 PROCEDURE — 1036F TOBACCO NON-USER: CPT | Performed by: OBSTETRICS & GYNECOLOGY

## 2024-11-21 PROCEDURE — 99213 OFFICE O/P EST LOW 20 MIN: CPT | Performed by: OBSTETRICS & GYNECOLOGY

## 2024-11-21 NOTE — PROGRESS NOTES
[facility]   Return Gyn Office Visit    CC:   Chief Complaint   Patient presents with    Follow-up       HPI:  34 y.o. who presents to [facility] for US and FU discussion:   She was last seen for annual exam where she complained of abnormal bleeding and pelvic pain.   This has persisted despite NSAID therapy.   Pt has a long hx of irregular, heavy periods and has tried multiple types of hormonal birth control in the past without benefit.   Has tried pills, nuvaring, mirena in the past (migrated).   I removed her tubes at her last c section. Interested in more definitive therapy.   States she is having periods almost every other week, also bleeds variably but mainly Heavy (Tampon every other day).   Patient's last menstrual period was 11/08/2024 (exact date).   She does mention some extra vaginal discharge today despite a round of diflucan -- was suspicious for yeast.   I recommend swabs to rule out other causes.     GYN hx is also significant for Right epithelial ovarian cancer.   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 She was subsequently seen by Dr. Lopes at [facility] for care. Pt states she underwent chemotherapy. Retains her uterus / cervix and left ovary / tube.   She subsequently has undergone 2 c sections without complication. She had a salpingectomy with last one due to completed childbearing and cancer prophylaxis.   Last PAP: 10/18/24 NILM/ HPV neg     Review of Systems - The following ROS was otherwise negative, except as noted in the HPI: constitutional, respiratory, cardiovascular, gastrointestinal, genitourinary    Objective:  /60 (Site: Left Upper Arm, Position: Sitting, Cuff Size: Medium Adult)   Pulse 78   Wt 56.5 kg (124 lb 9.6 oz)   LMP 11/08/2024 (Exact Date)   BMI 22.79 kg/m²   General: Alert, well appearing, no acute distress   Resp effort within normal limits   Abdomen: Soft, nontender,

## 2024-11-22 LAB
BV BACTERIA DNA VAG QL NAA+PROBE: NOT DETECTED
C GLABRATA DNA VAG QL NAA+PROBE: ABNORMAL
C GLABRATA DNA VAG QL NAA+PROBE: NOT DETECTED
C KRUSEI DNA VAG QL NAA+PROBE: NOT DETECTED
CANDIDA DNA VAG QL NAA+PROBE: DETECTED
T VAGINALIS DNA VAG QL NAA+PROBE: NOT DETECTED

## 2024-11-29 ENCOUNTER — TELEPHONE (OUTPATIENT)
Dept: OBGYN CLINIC | Age: 34
End: 2024-11-29

## 2024-12-02 NOTE — TELEPHONE ENCOUNTER
PT calling for update regarding surgery scheduling, she is also asking if you were able to speak [facility] On[facility]. Please advise. Routing to Bonny to call pt with status update.

## 2024-12-02 NOTE — TELEPHONE ENCOUNTER
Yes!  Please let pt know I did speak with Dr. Lopes   He recommends we keep her ovary in place due to the risks of significant hot flashes at an early age   We can remove it when she gets 40-45 yoa   But if she is very nervous about retaining it then we of course can remove it at the time of surgery if she understands the possible down sides     Thanks!  ALH

## 2024-12-15 ENCOUNTER — PREP FOR PROCEDURE (OUTPATIENT)
Dept: OBGYN CLINIC | Age: 34
End: 2024-12-15

## 2024-12-15 RX ORDER — SODIUM CHLORIDE 0.9 % (FLUSH) 0.9 %
5-40 SYRINGE (ML) INJECTION PRN
Status: CANCELLED | OUTPATIENT
Start: 2024-12-15

## 2024-12-15 RX ORDER — ACETAMINOPHEN 325 MG/1
1000 TABLET ORAL ONCE
Status: CANCELLED | OUTPATIENT
Start: 2024-12-15 | End: 2024-12-15

## 2024-12-15 RX ORDER — SODIUM CHLORIDE 0.9 % (FLUSH) 0.9 %
5-40 SYRINGE (ML) INJECTION EVERY 12 HOURS SCHEDULED
Status: CANCELLED | OUTPATIENT
Start: 2024-12-15

## 2024-12-15 RX ORDER — SODIUM CHLORIDE 9 MG/ML
INJECTION, SOLUTION INTRAVENOUS PRN
Status: CANCELLED | OUTPATIENT
Start: 2024-12-15

## 2024-12-15 RX ORDER — SODIUM CHLORIDE, SODIUM LACTATE, POTASSIUM CHLORIDE, CALCIUM CHLORIDE 600; 310; 30; 20 MG/100ML; MG/100ML; MG/100ML; MG/100ML
INJECTION, SOLUTION INTRAVENOUS CONTINUOUS
Status: CANCELLED | OUTPATIENT
Start: 2024-12-15

## 2024-12-17 NOTE — PROGRESS NOTES
Brianna HU Louis    Age 34 y.o.    female    1990    MRN [id number]    1/2/2025  Arrival Time_____________  OR Time____________210 Min     Procedure(s):  LAPAROSCOPIC ASSISTED VAGINAL HYSTERECTOMY, POSSIBLE LEFT OOPHORECTOMY, POSSIBLE CYSTOSCOPY                      **LATEX SENSITIVE**                      General   Surgeon(s):  Mable Oliver, Sushma West, MD      DAY ADMIT ___  SDS/OP ___  OUTPT IN BED ___        Phone [phone] (home)                  PCP _____________________ Phone_________________ Epic ( ) Epic CE ( ) Appt ________    NOTES: _________________________________________ Consult/Cardio _______________    ____________________________________________________________________________    ____________________________________________________________________________  PAT APPT DATE:________ TIME: ________  FAXED QAD: _______  (__) H&P w/ Hospitalist    (__) PAT orders in EPIC    (__) Meet with PAT nurse  __________________________________________________________________________  Preop Nurse phone screen complete: _____________  (__) CBC     (__) W/ DIFF ___________  (__) CT CHEST  __________   (__) Hgb A1C    ___________  (__) CHEST X RAY   __________  (__) LIPID PROFILE  ___________  (__) EKG   __________  (__) PT-INR / APTT  ___________  (__) PFT's   __________  (__) BMP   ___________  (__) CAROTIDS  __________  (__) CMP   ___________  (__) VEIN MAPPING  __________  (__) U/A   ___________  ( X ) HISTORY & PHYSICAL __________  (__) URINE C & S  ___________  (__) CARDIAC CLEARANCE __________  (__) U/A W/ FLEX  ___________  (__) PULM. CLEARANCE __________  (__) SERUM PREGNANCY ___________  (__) Preop Orders in EPIC __________  (__) TYPE & SCREEN __________repeat ( ) (__)  __________________ __________  (__) Albumin   ___________  (__)  __________________ __________  (__) TRANSFERRIN  ___________  (__)  __________________ __________  (__) LIVER PROFILE  ___________  (__) URINE PREG

## 2024-12-19 ENCOUNTER — OFFICE VISIT (OUTPATIENT)
Dept: OBGYN CLINIC | Age: 34
End: 2024-12-19

## 2024-12-19 VITALS
WEIGHT: 126.2 LBS | SYSTOLIC BLOOD PRESSURE: 134 MMHG | HEART RATE: 82 BPM | BODY MASS INDEX: 23.08 KG/M2 | DIASTOLIC BLOOD PRESSURE: 62 MMHG

## 2024-12-19 DIAGNOSIS — C56.1 RIGHT OVARIAN EPITHELIAL CANCER (HCC): ICD-10-CM

## 2024-12-19 DIAGNOSIS — Z01.818 PRE-OP EXAM: Primary | ICD-10-CM

## 2024-12-19 DIAGNOSIS — N93.9 ABNORMAL UTERINE BLEEDING (AUB): ICD-10-CM

## 2024-12-19 DIAGNOSIS — R10.2 PELVIC PAIN: ICD-10-CM

## 2024-12-19 DIAGNOSIS — N83.202 LEFT OVARIAN CYST: ICD-10-CM

## 2024-12-19 DIAGNOSIS — Z90.79 HISTORY OF BILATERAL SALPINGECTOMY: ICD-10-CM

## 2024-12-19 DIAGNOSIS — Z98.891 HISTORY OF 2 CESAREAN SECTIONS: ICD-10-CM

## 2024-12-19 PROCEDURE — 99024 POSTOP FOLLOW-UP VISIT: CPT | Performed by: OBSTETRICS & GYNECOLOGY

## 2024-12-19 RX ORDER — PANTOPRAZOLE SODIUM 40 MG/1
40 TABLET, DELAYED RELEASE ORAL DAILY
COMMUNITY
Start: 2024-09-05 | End: 2024-12-19

## 2024-12-26 NOTE — PROGRESS NOTES
Notified patient per Dr. Gonzalez's office, she WILL need to get a pre-op H&P prior to her surgery next week.

## 2024-12-26 NOTE — PROGRESS NOTES
Surgery Date and Time:1/2/25 07:30am    Arrival Time: 0545 am        The instructions given when and if a patient needs to stop oral intake prior to surgery varies. Follow the instructions you were      given by your Surgeon or RN during the Pre-op call.      __X__Do not eat or drink anything after Midnight the night before the surgery. NO gum, mints, candy or ice chips the day of surgery.           Only take the following medications with a small sip of water the morning of surgery: NONE       Hold the following medications (per anesthesia or surgeon request): NONE      Last Dose:        Aspirin, Ibuprofen, Advil, Naproxen, Vitamin E and other Anti-inflammatory products and supplements should be stopped       for 5 -7days before surgery or as directed by your physician.       - Do not smoke or vape, and do not drink any alcoholic beverages 24 hours prior to surgery, this includes NA Beer. Refrain from using     any recreational drugs, including non-prescribed prescription drugs.     -You may brush your teeth and gargle the morning of surgery.  DO NOT SWALLOW WATER.    -You MUST plan for a responsible adult to stay on site while you are here and take you home after your surgery. You will not be allowed                to leave alone or drive yourself home. It is requested someone stay with you the first 24 hrs. Your surgery will be cancelled if you do not                have a ride home with a responsible adult.    -A parent/legal guardian must accompany a child scheduled for surgery and plan to stay at the hospital until the child                is discharged. Please do not bring other children with you.    -Please wear simple, loose-fitting clothing to the hospital. Do not bring valuables (money, credit cards, checkbooks, etc.)                Do not wear any makeup (including no eye makeup) and no nail polish if applicable.             - DO NOT wear any jewelry or body piercings day of surgery.  All body

## 2024-12-27 ENCOUNTER — TELEPHONE (OUTPATIENT)
Dept: OBGYN CLINIC | Age: 34
End: 2024-12-27

## 2024-12-27 NOTE — TELEPHONE ENCOUNTER
PT calling very upset states she doesn't know why and Pre op physical is required and that she does not have the time to get one prior to surgery nor does she want to spend the money on one and take more time off work as she has no  and no PCP. I explained why a pre op physical was needed and her response was not very kind. I told her I understand and that I would reach out to  I also asked if she would like to cancel surgery and was told \"that's not what I'm saying, I just think this is stupid\" please advise.

## 2024-12-28 NOTE — PROGRESS NOTES
[facility]   Pre-Op Visit    CC:   Chief Complaint   Patient presents with    Pre-op Exam     Has umbilical hernia, worried about surgery making the pain worse.        HPI:  34 y.o. who presents to [facility] for Pre Op:    She was last seen for annual exam where she complained of abnormal bleeding and pelvic pain.   This has persisted despite NSAID therapy.   Pt has a long hx of irregular, heavy periods and has tried multiple types of hormonal birth control in the past without benefit.   Has tried pills, nuvaring, mirena in the past (migrated).   I removed her tubes at her last c section. Interested in more definitive therapy.   States she is having periods almost every other week, also bleeds variably but mainly Heavy (Tampon every other day).   Patient's last menstrual period was 12/08/2024 (exact date).   She does mention some extra vaginal discharge today despite a round of diflucan -- was suspicious for yeast.   I recommend swabs to rule out other causes.     GYN hx is also significant for Right epithelial ovarian cancer.   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 She was subsequently seen by Dr. Lopes at [facility] for care. Pt states she underwent chemotherapy. Retains her uterus / cervix and left ovary / tube.   She subsequently has undergone 2 c sections without complication. She had a salpingectomy with last one due to completed childbearing and cancer prophylaxis.   Last PAP: 10/18/24 NILM/ HPV neg     Review of Systems - The following ROS was otherwise negative, except as noted in the HPI: constitutional, respiratory, cardiovascular, gastrointestinal, genitourinary    Objective:  /62   Pulse 82   Wt 57.2 kg (126 lb 3.2 oz)   LMP 12/08/2024 (Exact Date)   BMI 23.08 kg/m²   General: Alert, well appearing, no acute distress   Resp effort within normal limits   Abdomen: Soft, nontender, nondistended

## 2024-12-31 ENCOUNTER — ANESTHESIA EVENT (OUTPATIENT)
Dept: OPERATING ROOM | Age: 34
End: 2024-12-31
Payer: COMMERCIAL

## 2025-01-01 NOTE — ANESTHESIA PRE PROCEDURE
Findings:         Anesthesia Plan      general     ASA 2     (I discussed with the patient the risks and benefits of PIV, general anesthesia, IV Narcotics, PACU.  All questions were answered the patient agrees with the plan and wishes to proceed.)  Induction: intravenous.    MIPS: Prophylactic antiemetics administered.  Anesthetic plan and risks discussed with patient.      Plan discussed with CRNA.                  Markel Issa MD   1/1/2025

## 2025-01-02 ENCOUNTER — ANESTHESIA (OUTPATIENT)
Dept: OPERATING ROOM | Age: 35
End: 2025-01-02
Payer: COMMERCIAL

## 2025-01-02 ENCOUNTER — HOSPITAL ENCOUNTER (OUTPATIENT)
Age: 35
Discharge: HOME | End: 2025-01-03
Attending: OBSTETRICS & GYNECOLOGY | Admitting: OBSTETRICS & GYNECOLOGY
Payer: COMMERCIAL

## 2025-01-02 DIAGNOSIS — N93.9 ABNORMAL UTERINE BLEEDING (AUB): ICD-10-CM

## 2025-01-02 DIAGNOSIS — Z85.43 HX OF OVARIAN CANCER: ICD-10-CM

## 2025-01-02 DIAGNOSIS — Z90.710 S/P LAPAROSCOPIC ASSISTED VAGINAL HYSTERECTOMY (LAVH): Primary | ICD-10-CM

## 2025-01-02 DIAGNOSIS — N76.1 SUBACUTE VAGINITIS: ICD-10-CM

## 2025-01-02 DIAGNOSIS — C56.1 CANCER OF OVARY, RIGHT (HCC): ICD-10-CM

## 2025-01-02 LAB
ALBUMIN SERPL-MCNC: 4 G/DL (ref 3.4–5)
ALBUMIN/GLOB SERPL: 1.8 {RATIO} (ref 1.1–2.2)
ALP SERPL-CCNC: 51 U/L (ref 40–129)
ALT SERPL-CCNC: 13 U/L (ref 10–40)
ANION GAP SERPL CALCULATED.3IONS-SCNC: 8 MMOL/L (ref 3–16)
AST SERPL-CCNC: 18 U/L (ref 15–37)
BASOPHILS # BLD: 0 K/UL (ref 0–0.2)
BASOPHILS NFR BLD: 0.9 %
BILIRUB SERPL-MCNC: 0.6 MG/DL (ref 0–1)
BUN SERPL-MCNC: 12 MG/DL (ref 7–20)
CALCIUM SERPL-MCNC: 8.3 MG/DL (ref 8.3–10.6)
CHLORIDE SERPL-SCNC: 108 MMOL/L (ref 99–110)
CO2 SERPL-SCNC: 25 MMOL/L (ref 21–32)
CREAT SERPL-MCNC: 0.6 MG/DL (ref 0.6–1.1)
DEPRECATED RDW RBC AUTO: 14 % (ref 12.4–15.4)
EOSINOPHIL # BLD: 0.1 K/UL (ref 0–0.6)
EOSINOPHIL NFR BLD: 1.3 %
GFR SERPLBLD CREATININE-BSD FMLA CKD-EPI: >90 ML/MIN/{1.73_M2}
GLUCOSE SERPL-MCNC: 93 MG/DL (ref 70–99)
HCT VFR BLD AUTO: 32.6 % (ref 36–48)
HCT VFR BLD AUTO: 34 % (ref 36–48)
HCT VFR BLD AUTO: 35.2 % (ref 36–48)
HGB BLD-MCNC: 10.9 G/DL (ref 12–16)
HGB BLD-MCNC: 11.3 G/DL (ref 12–16)
HGB BLD-MCNC: 11.5 G/DL (ref 12–16)
LYMPHOCYTES # BLD: 1.2 K/UL (ref 1–5.1)
LYMPHOCYTES NFR BLD: 28.5 %
MCH RBC QN AUTO: 27.2 PG (ref 26–34)
MCHC RBC AUTO-ENTMCNC: 33.2 G/DL (ref 31–36)
MCV RBC AUTO: 81.9 FL (ref 80–100)
MONOCYTES # BLD: 0.4 K/UL (ref 0–1.3)
MONOCYTES NFR BLD: 9.7 %
NEUTROPHILS # BLD: 2.5 K/UL (ref 1.7–7.7)
NEUTROPHILS NFR BLD: 59.6 %
PLATELET # BLD AUTO: 195 K/UL (ref 135–450)
PMV BLD AUTO: 8 FL (ref 5–10.5)
POTASSIUM SERPL-SCNC: 3.8 MMOL/L (ref 3.5–5.1)
PROT SERPL-MCNC: 6.2 G/DL (ref 6.4–8.2)
RBC # BLD AUTO: 4.15 M/UL (ref 4–5.2)
SODIUM SERPL-SCNC: 141 MMOL/L (ref 136–145)
WBC # BLD AUTO: 4.2 K/UL (ref 4–11)

## 2025-01-02 PROCEDURE — 2580000003 HC RX 258: Performed by: STUDENT IN AN ORGANIZED HEALTH CARE EDUCATION/TRAINING PROGRAM

## 2025-01-02 PROCEDURE — 6360000002 HC RX W HCPCS: Performed by: OBSTETRICS & GYNECOLOGY

## 2025-01-02 PROCEDURE — 6360000002 HC RX W HCPCS: Performed by: NURSE ANESTHETIST, CERTIFIED REGISTERED

## 2025-01-02 PROCEDURE — 36415 COLL VENOUS BLD VENIPUNCTURE: CPT

## 2025-01-02 PROCEDURE — 85018 HEMOGLOBIN: CPT

## 2025-01-02 PROCEDURE — 85025 COMPLETE CBC W/AUTO DIFF WBC: CPT

## 2025-01-02 PROCEDURE — 2500000003 HC RX 250 WO HCPCS: Performed by: OBSTETRICS & GYNECOLOGY

## 2025-01-02 PROCEDURE — 6370000000 HC RX 637 (ALT 250 FOR IP): Performed by: STUDENT IN AN ORGANIZED HEALTH CARE EDUCATION/TRAINING PROGRAM

## 2025-01-02 PROCEDURE — 3600000015 HC SURGERY LEVEL 5 ADDTL 15MIN: Performed by: OBSTETRICS & GYNECOLOGY

## 2025-01-02 PROCEDURE — 88307 TISSUE EXAM BY PATHOLOGIST: CPT

## 2025-01-02 PROCEDURE — 1200000000 HC SEMI PRIVATE

## 2025-01-02 PROCEDURE — 80053 COMPREHEN METABOLIC PANEL: CPT

## 2025-01-02 PROCEDURE — 2500000003 HC RX 250 WO HCPCS: Performed by: NURSE ANESTHETIST, CERTIFIED REGISTERED

## 2025-01-02 PROCEDURE — 3700000000 HC ANESTHESIA ATTENDED CARE: Performed by: OBSTETRICS & GYNECOLOGY

## 2025-01-02 PROCEDURE — 85014 HEMATOCRIT: CPT

## 2025-01-02 PROCEDURE — 2709999900 HC NON-CHARGEABLE SUPPLY: Performed by: OBSTETRICS & GYNECOLOGY

## 2025-01-02 PROCEDURE — 6370000000 HC RX 637 (ALT 250 FOR IP): Performed by: OBSTETRICS & GYNECOLOGY

## 2025-01-02 PROCEDURE — 3700000001 HC ADD 15 MINUTES (ANESTHESIA): Performed by: OBSTETRICS & GYNECOLOGY

## 2025-01-02 PROCEDURE — 2580000003 HC RX 258: Performed by: OBSTETRICS & GYNECOLOGY

## 2025-01-02 PROCEDURE — 7100000000 HC PACU RECOVERY - FIRST 15 MIN: Performed by: OBSTETRICS & GYNECOLOGY

## 2025-01-02 PROCEDURE — 3600000005 HC SURGERY LEVEL 5 BASE: Performed by: OBSTETRICS & GYNECOLOGY

## 2025-01-02 PROCEDURE — 7100000001 HC PACU RECOVERY - ADDTL 15 MIN: Performed by: OBSTETRICS & GYNECOLOGY

## 2025-01-02 PROCEDURE — 6360000002 HC RX W HCPCS: Performed by: STUDENT IN AN ORGANIZED HEALTH CARE EDUCATION/TRAINING PROGRAM

## 2025-01-02 PROCEDURE — 2500000003 HC RX 250 WO HCPCS: Performed by: STUDENT IN AN ORGANIZED HEALTH CARE EDUCATION/TRAINING PROGRAM

## 2025-01-02 PROCEDURE — 2580000003 HC RX 258: Performed by: NURSE ANESTHETIST, CERTIFIED REGISTERED

## 2025-01-02 RX ORDER — MAGNESIUM HYDROXIDE 1200 MG/15ML
LIQUID ORAL CONTINUOUS PRN
Status: COMPLETED | OUTPATIENT
Start: 2025-01-02 | End: 2025-01-02

## 2025-01-02 RX ORDER — SODIUM CHLORIDE 0.9 % (FLUSH) 0.9 %
5-40 SYRINGE (ML) INJECTION PRN
Status: DISCONTINUED | OUTPATIENT
Start: 2025-01-02 | End: 2025-01-02 | Stop reason: HOSPADM

## 2025-01-02 RX ORDER — MEPERIDINE HYDROCHLORIDE 50 MG/ML
12.5 INJECTION INTRAMUSCULAR; INTRAVENOUS; SUBCUTANEOUS EVERY 5 MIN PRN
Status: DISCONTINUED | OUTPATIENT
Start: 2025-01-02 | End: 2025-01-02 | Stop reason: HOSPADM

## 2025-01-02 RX ORDER — SODIUM CHLORIDE, SODIUM LACTATE, POTASSIUM CHLORIDE, CALCIUM CHLORIDE 600; 310; 30; 20 MG/100ML; MG/100ML; MG/100ML; MG/100ML
INJECTION, SOLUTION INTRAVENOUS CONTINUOUS
Status: DISCONTINUED | OUTPATIENT
Start: 2025-01-02 | End: 2025-01-02 | Stop reason: HOSPADM

## 2025-01-02 RX ORDER — NALOXONE HYDROCHLORIDE 0.4 MG/ML
INJECTION, SOLUTION INTRAMUSCULAR; INTRAVENOUS; SUBCUTANEOUS PRN
Status: DISCONTINUED | OUTPATIENT
Start: 2025-01-02 | End: 2025-01-02 | Stop reason: HOSPADM

## 2025-01-02 RX ORDER — SODIUM CHLORIDE 0.9 % (FLUSH) 0.9 %
5-40 SYRINGE (ML) INJECTION EVERY 12 HOURS SCHEDULED
Status: DISCONTINUED | OUTPATIENT
Start: 2025-01-02 | End: 2025-01-02 | Stop reason: HOSPADM

## 2025-01-02 RX ORDER — DEXAMETHASONE SODIUM PHOSPHATE 4 MG/ML
INJECTION, SOLUTION INTRA-ARTICULAR; INTRALESIONAL; INTRAMUSCULAR; INTRAVENOUS; SOFT TISSUE
Status: DISCONTINUED | OUTPATIENT
Start: 2025-01-02 | End: 2025-01-02 | Stop reason: SDUPTHER

## 2025-01-02 RX ORDER — LABETALOL HYDROCHLORIDE 5 MG/ML
10 INJECTION, SOLUTION INTRAVENOUS
Status: DISCONTINUED | OUTPATIENT
Start: 2025-01-02 | End: 2025-01-02 | Stop reason: HOSPADM

## 2025-01-02 RX ORDER — DROPERIDOL 2.5 MG/ML
0.62 INJECTION, SOLUTION INTRAMUSCULAR; INTRAVENOUS
Status: DISCONTINUED | OUTPATIENT
Start: 2025-01-02 | End: 2025-01-02 | Stop reason: HOSPADM

## 2025-01-02 RX ORDER — SODIUM CHLORIDE 9 MG/ML
INJECTION, SOLUTION INTRAVENOUS PRN
Status: DISCONTINUED | OUTPATIENT
Start: 2025-01-02 | End: 2025-01-02 | Stop reason: HOSPADM

## 2025-01-02 RX ORDER — LIDOCAINE HYDROCHLORIDE AND EPINEPHRINE 10; 10 MG/ML; UG/ML
INJECTION, SOLUTION INFILTRATION; PERINEURAL PRN
Status: DISCONTINUED | OUTPATIENT
Start: 2025-01-02 | End: 2025-01-02 | Stop reason: ALTCHOICE

## 2025-01-02 RX ORDER — PROPOFOL 10 MG/ML
INJECTION, EMULSION INTRAVENOUS
Status: DISCONTINUED | OUTPATIENT
Start: 2025-01-02 | End: 2025-01-02 | Stop reason: SDUPTHER

## 2025-01-02 RX ORDER — LIDOCAINE HYDROCHLORIDE 10 MG/ML
0.3 INJECTION, SOLUTION EPIDURAL; INFILTRATION; INTRACAUDAL; PERINEURAL
Status: DISCONTINUED | OUTPATIENT
Start: 2025-01-02 | End: 2025-01-02 | Stop reason: HOSPADM

## 2025-01-02 RX ORDER — DIPHENHYDRAMINE HYDROCHLORIDE 50 MG/ML
12.5 INJECTION INTRAMUSCULAR; INTRAVENOUS
Status: DISCONTINUED | OUTPATIENT
Start: 2025-01-02 | End: 2025-01-02 | Stop reason: HOSPADM

## 2025-01-02 RX ORDER — ACETAMINOPHEN 500 MG
1000 TABLET ORAL ONCE
Status: COMPLETED | OUTPATIENT
Start: 2025-01-02 | End: 2025-01-02

## 2025-01-02 RX ORDER — FENTANYL CITRATE 50 UG/ML
INJECTION, SOLUTION INTRAMUSCULAR; INTRAVENOUS
Status: DISCONTINUED | OUTPATIENT
Start: 2025-01-02 | End: 2025-01-02 | Stop reason: SDUPTHER

## 2025-01-02 RX ORDER — DOCUSATE SODIUM 100 MG/1
100 CAPSULE, LIQUID FILLED ORAL 2 TIMES DAILY
Status: DISCONTINUED | OUTPATIENT
Start: 2025-01-02 | End: 2025-01-03 | Stop reason: HOSPADM

## 2025-01-02 RX ORDER — ONDANSETRON 4 MG/1
4 TABLET, ORALLY DISINTEGRATING ORAL EVERY 8 HOURS PRN
Status: DISCONTINUED | OUTPATIENT
Start: 2025-01-02 | End: 2025-01-03 | Stop reason: HOSPADM

## 2025-01-02 RX ORDER — SODIUM CHLORIDE 0.9 % (FLUSH) 0.9 %
5-40 SYRINGE (ML) INJECTION EVERY 12 HOURS SCHEDULED
Status: DISCONTINUED | OUTPATIENT
Start: 2025-01-02 | End: 2025-01-03 | Stop reason: HOSPADM

## 2025-01-02 RX ORDER — IBUPROFEN 400 MG/1
800 TABLET, FILM COATED ORAL EVERY 8 HOURS
Status: DISCONTINUED | OUTPATIENT
Start: 2025-01-03 | End: 2025-01-03 | Stop reason: HOSPADM

## 2025-01-02 RX ORDER — PROCHLORPERAZINE EDISYLATE 5 MG/ML
10 INJECTION INTRAMUSCULAR; INTRAVENOUS EVERY 6 HOURS PRN
Status: DISCONTINUED | OUTPATIENT
Start: 2025-01-02 | End: 2025-01-03 | Stop reason: HOSPADM

## 2025-01-02 RX ORDER — APREPITANT 40 MG/1
40 CAPSULE ORAL ONCE
Status: COMPLETED | OUTPATIENT
Start: 2025-01-02 | End: 2025-01-02

## 2025-01-02 RX ORDER — SCOLOPAMINE TRANSDERMAL SYSTEM 1 MG/1
1 PATCH, EXTENDED RELEASE TRANSDERMAL ONCE
Status: DISCONTINUED | OUTPATIENT
Start: 2025-01-02 | End: 2025-01-02

## 2025-01-02 RX ORDER — SODIUM CHLORIDE 9 MG/ML
INJECTION, SOLUTION INTRAVENOUS PRN
Status: DISCONTINUED | OUTPATIENT
Start: 2025-01-02 | End: 2025-01-03 | Stop reason: HOSPADM

## 2025-01-02 RX ORDER — SODIUM CHLORIDE 0.9 % (FLUSH) 0.9 %
5-40 SYRINGE (ML) INJECTION PRN
Status: DISCONTINUED | OUTPATIENT
Start: 2025-01-02 | End: 2025-01-03 | Stop reason: HOSPADM

## 2025-01-02 RX ORDER — LIDOCAINE HYDROCHLORIDE 20 MG/ML
INJECTION, SOLUTION INFILTRATION; PERINEURAL
Status: DISCONTINUED | OUTPATIENT
Start: 2025-01-02 | End: 2025-01-02 | Stop reason: SDUPTHER

## 2025-01-02 RX ORDER — MIDAZOLAM HYDROCHLORIDE 1 MG/ML
INJECTION, SOLUTION INTRAMUSCULAR; INTRAVENOUS
Status: DISCONTINUED | OUTPATIENT
Start: 2025-01-02 | End: 2025-01-02 | Stop reason: SDUPTHER

## 2025-01-02 RX ORDER — ONDANSETRON 2 MG/ML
4 INJECTION INTRAMUSCULAR; INTRAVENOUS EVERY 6 HOURS PRN
Status: DISCONTINUED | OUTPATIENT
Start: 2025-01-02 | End: 2025-01-03 | Stop reason: HOSPADM

## 2025-01-02 RX ORDER — KETOROLAC TROMETHAMINE 30 MG/ML
30 INJECTION, SOLUTION INTRAMUSCULAR; INTRAVENOUS EVERY 6 HOURS SCHEDULED
Status: DISCONTINUED | OUTPATIENT
Start: 2025-01-02 | End: 2025-01-03 | Stop reason: HOSPADM

## 2025-01-02 RX ORDER — SODIUM CHLORIDE, SODIUM LACTATE, POTASSIUM CHLORIDE, CALCIUM CHLORIDE 600; 310; 30; 20 MG/100ML; MG/100ML; MG/100ML; MG/100ML
INJECTION, SOLUTION INTRAVENOUS CONTINUOUS
Status: DISCONTINUED | OUTPATIENT
Start: 2025-01-02 | End: 2025-01-02

## 2025-01-02 RX ORDER — OXYCODONE HYDROCHLORIDE 5 MG/1
5 TABLET ORAL EVERY 4 HOURS PRN
Status: DISCONTINUED | OUTPATIENT
Start: 2025-01-02 | End: 2025-01-03 | Stop reason: HOSPADM

## 2025-01-02 RX ORDER — OXYCODONE HYDROCHLORIDE 5 MG/1
5 TABLET ORAL PRN
Status: DISCONTINUED | OUTPATIENT
Start: 2025-01-02 | End: 2025-01-02 | Stop reason: HOSPADM

## 2025-01-02 RX ORDER — ROCURONIUM BROMIDE 10 MG/ML
INJECTION, SOLUTION INTRAVENOUS
Status: DISCONTINUED | OUTPATIENT
Start: 2025-01-02 | End: 2025-01-02 | Stop reason: SDUPTHER

## 2025-01-02 RX ORDER — ONDANSETRON 2 MG/ML
INJECTION INTRAMUSCULAR; INTRAVENOUS
Status: DISCONTINUED | OUTPATIENT
Start: 2025-01-02 | End: 2025-01-02 | Stop reason: SDUPTHER

## 2025-01-02 RX ORDER — SODIUM CHLORIDE 9 MG/ML
INJECTION, SOLUTION INTRAVENOUS
Status: DISCONTINUED | OUTPATIENT
Start: 2025-01-02 | End: 2025-01-02 | Stop reason: SDUPTHER

## 2025-01-02 RX ORDER — ACETAMINOPHEN 500 MG
1000 TABLET ORAL EVERY 8 HOURS SCHEDULED
Status: DISCONTINUED | OUTPATIENT
Start: 2025-01-02 | End: 2025-01-03 | Stop reason: HOSPADM

## 2025-01-02 RX ORDER — OXYCODONE HYDROCHLORIDE 5 MG/1
10 TABLET ORAL PRN
Status: DISCONTINUED | OUTPATIENT
Start: 2025-01-02 | End: 2025-01-02 | Stop reason: HOSPADM

## 2025-01-02 RX ADMIN — FENTANYL CITRATE 100 MCG: 50 INJECTION, SOLUTION INTRAMUSCULAR; INTRAVENOUS at 08:36

## 2025-01-02 RX ADMIN — APREPITANT 40 MG: 40 CAPSULE ORAL at 06:33

## 2025-01-02 RX ADMIN — ROCURONIUM BROMIDE 50 MG: 50 INJECTION, SOLUTION INTRAVENOUS at 07:34

## 2025-01-02 RX ADMIN — DOCUSATE SODIUM 100 MG: 100 CAPSULE, LIQUID FILLED ORAL at 12:30

## 2025-01-02 RX ADMIN — HYDROMORPHONE HYDROCHLORIDE 0.5 MG: 1 INJECTION, SOLUTION INTRAMUSCULAR; INTRAVENOUS; SUBCUTANEOUS at 10:33

## 2025-01-02 RX ADMIN — KETOROLAC TROMETHAMINE 30 MG: 30 INJECTION INTRAMUSCULAR; INTRAVENOUS at 18:14

## 2025-01-02 RX ADMIN — ACETAMINOPHEN 1000 MG: 500 TABLET ORAL at 06:33

## 2025-01-02 RX ADMIN — SUGAMMADEX 50 MG: 100 INJECTION, SOLUTION INTRAVENOUS at 09:16

## 2025-01-02 RX ADMIN — ACETAMINOPHEN 1000 MG: 500 TABLET ORAL at 20:40

## 2025-01-02 RX ADMIN — SUGAMMADEX 50 MG: 100 INJECTION, SOLUTION INTRAVENOUS at 09:14

## 2025-01-02 RX ADMIN — MIDAZOLAM 2 MG: 1 INJECTION INTRAMUSCULAR; INTRAVENOUS at 07:26

## 2025-01-02 RX ADMIN — CEFAZOLIN 2000 MG: 2 INJECTION, POWDER, FOR SOLUTION INTRAVENOUS at 07:44

## 2025-01-02 RX ADMIN — SODIUM CHLORIDE: 9 INJECTION, SOLUTION INTRAVENOUS at 06:42

## 2025-01-02 RX ADMIN — DOCUSATE SODIUM 100 MG: 100 CAPSULE, LIQUID FILLED ORAL at 20:40

## 2025-01-02 RX ADMIN — FAMOTIDINE 20 MG: 10 INJECTION, SOLUTION INTRAVENOUS at 06:47

## 2025-01-02 RX ADMIN — ONDANSETRON 4 MG: 2 INJECTION INTRAMUSCULAR; INTRAVENOUS at 08:33

## 2025-01-02 RX ADMIN — SUGAMMADEX 50 MG: 100 INJECTION, SOLUTION INTRAVENOUS at 09:13

## 2025-01-02 RX ADMIN — LIDOCAINE HYDROCHLORIDE 80 MG: 20 INJECTION, SOLUTION INFILTRATION; PERINEURAL at 07:34

## 2025-01-02 RX ADMIN — KETOROLAC TROMETHAMINE 30 MG: 30 INJECTION INTRAMUSCULAR; INTRAVENOUS at 12:27

## 2025-01-02 RX ADMIN — DEXAMETHASONE SODIUM PHOSPHATE 6 MG: 4 INJECTION, SOLUTION INTRAMUSCULAR; INTRAVENOUS at 08:33

## 2025-01-02 RX ADMIN — PROPOFOL 200 MG: 10 INJECTION, EMULSION INTRAVENOUS at 07:34

## 2025-01-02 RX ADMIN — SUGAMMADEX 50 MG: 100 INJECTION, SOLUTION INTRAVENOUS at 09:15

## 2025-01-02 RX ADMIN — SODIUM CHLORIDE, SODIUM LACTATE, POTASSIUM CHLORIDE, AND CALCIUM CHLORIDE: .6; .31; .03; .02 INJECTION, SOLUTION INTRAVENOUS at 12:32

## 2025-01-02 RX ADMIN — Medication 10 ML: at 20:41

## 2025-01-02 RX ADMIN — ACETAMINOPHEN 1000 MG: 500 TABLET ORAL at 12:30

## 2025-01-02 ASSESSMENT — PAIN DESCRIPTION - DESCRIPTORS
DESCRIPTORS: CRAMPING
DESCRIPTORS: ACHING;CRAMPING;DISCOMFORT
DESCRIPTORS: ACHING;SORE
DESCRIPTORS: ACHING;SORE

## 2025-01-02 ASSESSMENT — PAIN - FUNCTIONAL ASSESSMENT
PAIN_FUNCTIONAL_ASSESSMENT: PREVENTS OR INTERFERES SOME ACTIVE ACTIVITIES AND ADLS
PAIN_FUNCTIONAL_ASSESSMENT: 0-10
PAIN_FUNCTIONAL_ASSESSMENT: PREVENTS OR INTERFERES SOME ACTIVE ACTIVITIES AND ADLS
PAIN_FUNCTIONAL_ASSESSMENT: PREVENTS OR INTERFERES SOME ACTIVE ACTIVITIES AND ADLS
PAIN_FUNCTIONAL_ASSESSMENT: ACTIVITIES ARE NOT PREVENTED

## 2025-01-02 ASSESSMENT — ENCOUNTER SYMPTOMS
COLOR CHANGE: 1
BACK PAIN: 0
SHORTNESS OF BREATH: 0
CONSTIPATION: 0
ABDOMINAL PAIN: 0
BLOOD IN STOOL: 0
WHEEZING: 0

## 2025-01-02 ASSESSMENT — PAIN DESCRIPTION - LOCATION
LOCATION: SHOULDER;ABDOMEN
LOCATION: ABDOMEN
LOCATION: ABDOMEN
LOCATION: ABDOMEN;BACK

## 2025-01-02 ASSESSMENT — PAIN SCALES - GENERAL
PAINLEVEL_OUTOF10: 7
PAINLEVEL_OUTOF10: 3
PAINLEVEL_OUTOF10: 0
PAINLEVEL_OUTOF10: 0
PAINLEVEL_OUTOF10: 3
PAINLEVEL_OUTOF10: 0
PAINLEVEL_OUTOF10: 1
PAINLEVEL_OUTOF10: 2
PAINLEVEL_OUTOF10: 2

## 2025-01-02 ASSESSMENT — PAIN DESCRIPTION - ORIENTATION
ORIENTATION: LOWER
ORIENTATION: RIGHT;LEFT;MID
ORIENTATION: LOWER
ORIENTATION: RIGHT;LEFT;MID

## 2025-01-02 ASSESSMENT — PAIN DESCRIPTION - PAIN TYPE: TYPE: SURGICAL PAIN

## 2025-01-02 NOTE — PROGRESS NOTES
Patient admitted to [facility] in preparation for surgery, VSS. Consent confirmed. IV inserted into ***, LR infusing. Belongings shoes, shirt,pants, undergarments. NPO since 2100. Family at bedside, phone number in system for text updates, call light within reach.

## 2025-01-02 NOTE — PROGRESS NOTES
Bedside report completed with C3 RN Melany. Procedure sites assessed, WNL. VSS. Patient tolerating PO liquids and solids, respiratory status stable on RA. Lab results in chart. Transport request placed in bedwatch.     Pt belongings in care of pt's  to C3 with C3 RN.

## 2025-01-02 NOTE — H&P
DO Dieudonne at [facility]     SECTION N/A 2023     SECTION performed by Mable Oliver DO at [facility]     SECTION  2023    DENTAL SURGERY      impacted tooth    INSERTION / REMOVAL / REPLACEMENT VENOUS ACCESS CATHETER Left 2019    LEFT SUBCLAVIAN INFUSAPORT PLACEMENT performed by Tay Lopes DO at [facility] OR    SALPINGO-OOPHORECTOMY Right 2019    EXPLOROTORY LAPAROTOMY, RIGHT SALPINGO OOPHORECTOMY,   PELVIC WASHINGS, BIOPSIES OF CECUM SEROSA. SMALL BOWEL SEROSA, OMENTUM performed by CARLOS Terrazsa MD at [facility] OR    [facility] AND BSO (CERVIX REMOVED) Right 2019    INCORRECT PROCEDURE       Family History:  Family History   Problem Relation Age of Onset    Other Mother     Depression Mother     Depression Father     Ovarian Cancer Paternal Great Grandmother        Social History:  Social History     Substance and Sexual Activity   Alcohol Use Not Currently    Comment: occ     Social History     Substance and Sexual Activity   Drug Use Never     Social History     Tobacco Use   Smoking Status Never   Smokeless Tobacco Never       Physical Exam:  /79   Pulse 87   Temp 97.6 °F (36.4 °C) (Oral)   Resp 16   Ht 1.588 m (5' 2.5\")   Wt 55.3 kg (122 lb)   LMP 2024 (Exact Date)   SpO2 100%   BMI 21.96 kg/m²   General: Alert, well appearing, no acute distress  Head: Normocephalic, atraumatic  Mouth: Mucous membranes moist, pharynx normal without lesions  Thyroid: No thyromegaly or masses present   Lungs: Clear to auscultation bilaterally without rales, rhonchi, wheezing  CV: Regular rate and regular rhythm, normal S1, S2 without murmurs, rubs, clicks or gallops.  Abdomen: Soft, nontender, nondistended   Pelvic: deferred   Rectovaginal: deferred  Extremities: No redness or tenderness, neg Lena's sign  Skin: Well perfused, normal coloration and turgor, no lesions or rashes visualized  Neuro: Alert, oriented, normal speech, no focal deficits, moves

## 2025-01-02 NOTE — PROGRESS NOTES
Pt alert and orientated. Orientated to room and surrounding. Call light within reach and family is at bedside

## 2025-01-02 NOTE — ANESTHESIA POSTPROCEDURE EVALUATION
35.2 (L)            01/02/2025 10:50 AM        PLT                      195                 01/02/2025 06:30 AM   RENAL  Lab Results       Component                Value               Date/Time                  NA                       141                 01/02/2025 06:30 AM        K                        3.8                 01/02/2025 06:30 AM        K                        4.8                 02/28/2022 06:20 AM        CL                       108                 01/02/2025 06:30 AM        CO2                      25                  01/02/2025 06:30 AM        BUN                      12                  01/02/2025 06:30 AM        CREATININE               0.6                 01/02/2025 06:30 AM        GLUCOSE                  93                  01/02/2025 06:30 AM   COAGS  No results found for: \"PROTIME\", \"INR\", \"APTT\"    Intake & Output:  @24HRIO@    Nausea & Vomiting:  No    Level of Consciousness:  Awake    Pain Assessment:  Adequate analgesia    Anesthesia Complications:  No apparent anesthetic complications    SUMMARY      Vital signs stable  OK to discharge from Stage I post anesthesia care.  Care transferred from Anesthesiology department on discharge from perioperative area       No notable events documented.

## 2025-01-02 NOTE — PROGRESS NOTES
.  4 Eyes Skin Assessment and Patient belongings     The patient is being assess for  Post-Op Surgical    I agree that 2 Nurses have performed a thorough Head to Toe Skin Assessment on the patient. ALL assessment sites listed below have been assessed.       Areas assessed by both nurses: Melany RN and Lisa SCOTT  [x]   Head, Face, and Ears   [x]   Shoulders, Back, and Chest  [x]   Arms, Elbows, and Hands   [x]   Coccyx, Sacrum, and IschIum  [x]   Legs, Feet, and Heels        Does the Patient have Skin Breakdown?  No         Dawit Prevention initiated:  No   Wound Care Orders initiated:  No      [facility] nurse consulted for Pressure Injury (Stage 3,4, Unstageable, DTI, NWPT, and Complex wounds), New and Established Ostomies:  No      I agree that 2 Nurses have reviewed patient belongings with the patient/family and documented in the flowsheet upon admission or transfer to the unit.     Belongings  Dental Appliances: None  Vision - Corrective Lenses: None  Hearing Aid: None  Clothing: Footwear, Pants, Shirt, Sweater, Undergarments  Jewelry: None  Body Piercings Removed: No  Electronic Devices: None  Weapons (Notify Protective Services/Security): None  Other Valuables: Other (Comment)  Home Medications: None  Valuables Given To: Family (Comment), Patient  Provide Name(s) of Who Valuable(s) Were Given To: Warren  Responsible person(s) in the waiting room: Warren ([phone])  Patient approves for provider to speak to responsible person post operatively: Yes       Nurse 1 eSignature: Electronically signed by Melany Little RN on 1/2/25 at 1:00 PM EST    **SHARE this note so that the co-signing nurse is able to place an eSignature**    Nurse 2 eSignature: Electronically signed by Lisa Woods RN on 1/2/25 at 6:46 PM EST

## 2025-01-02 NOTE — PROGRESS NOTES
Patient arrived to PACU bay 4, phase one initiated. Placed on continuous cardiac and O2 bedside monitor. VSS, cycling. Report obtained from OR RN and anesthesia. Patient on RA. Surgical site assessment WNL. Fluids infusing. Warm blankets applied. Call light in reach, all standard safety measures in place.

## 2025-01-03 VITALS
HEART RATE: 72 BPM | HEIGHT: 63 IN | OXYGEN SATURATION: 100 % | WEIGHT: 122 LBS | TEMPERATURE: 98.2 F | SYSTOLIC BLOOD PRESSURE: 108 MMHG | DIASTOLIC BLOOD PRESSURE: 81 MMHG | RESPIRATION RATE: 16 BRPM | BODY MASS INDEX: 21.62 KG/M2

## 2025-01-03 LAB
HCT VFR BLD AUTO: 30 % (ref 36–48)
HGB BLD-MCNC: 10.3 G/DL (ref 12–16)

## 2025-01-03 PROCEDURE — 6370000000 HC RX 637 (ALT 250 FOR IP): Performed by: OBSTETRICS & GYNECOLOGY

## 2025-01-03 PROCEDURE — 6360000002 HC RX W HCPCS: Performed by: OBSTETRICS & GYNECOLOGY

## 2025-01-03 PROCEDURE — 85014 HEMATOCRIT: CPT

## 2025-01-03 PROCEDURE — 36415 COLL VENOUS BLD VENIPUNCTURE: CPT

## 2025-01-03 PROCEDURE — 85018 HEMOGLOBIN: CPT

## 2025-01-03 RX ORDER — OXYCODONE AND ACETAMINOPHEN 5; 325 MG/1; MG/1
1 TABLET ORAL EVERY 6 HOURS PRN
Qty: 28 TABLET | Refills: 0 | Status: SHIPPED | OUTPATIENT
Start: 2025-01-03 | End: 2025-01-10

## 2025-01-03 RX ORDER — IBUPROFEN 800 MG/1
800 TABLET, FILM COATED ORAL EVERY 8 HOURS PRN
Qty: 30 TABLET | Refills: 0 | Status: SHIPPED | OUTPATIENT
Start: 2025-01-03

## 2025-01-03 RX ORDER — DOCUSATE SODIUM 100 MG/1
100 CAPSULE, LIQUID FILLED ORAL 2 TIMES DAILY
Qty: 60 CAPSULE | Refills: 0 | Status: SHIPPED | OUTPATIENT
Start: 2025-01-03

## 2025-01-03 RX ADMIN — ACETAMINOPHEN 1000 MG: 500 TABLET ORAL at 06:21

## 2025-01-03 RX ADMIN — KETOROLAC TROMETHAMINE 30 MG: 30 INJECTION INTRAMUSCULAR; INTRAVENOUS at 00:18

## 2025-01-03 ASSESSMENT — PAIN SCALES - GENERAL
PAINLEVEL_OUTOF10: 2
PAINLEVEL_OUTOF10: 3

## 2025-01-03 NOTE — DISCHARGE SUMMARY
Department of Obstetrics and Gynecology  Post Operative Discharge Summary      Admit Date: 1/2/2025    Admit Diagnosis: Abnormal uterine bleeding (AUB) [N93.9]  Subacute vaginitis [N76.1]  Cancer of ovary, right (HCC) [C56.1]  Hx of ovarian cancer [Z85.43]  S/P laparoscopic assisted vaginal hysterectomy (LAVH) [Z90.710]    Discharge Date: 01/03/25    Condition at Discharge: good    Discharge Diagnoses:  s/p LAVH     Discharge Disposition:  Home    Service: Obstetrics / Gynecology     Post op complications: none     Hospital Course: uncomplicated    FU in 2 -3 weeks in office    Current Discharge Medication List        START taking these medications    Details   ibuprofen (ADVIL;MOTRIN) 800 MG tablet Take 1 tablet by mouth every 8 hours as needed for Pain  Qty: 30 tablet, Refills: 0      oxyCODONE-acetaminophen (PERCOCET) 5-325 MG per tablet Take 1 tablet by mouth every 6 hours as needed for Pain for up to 7 days. Intended supply: 7 days. Take lowest dose possible to manage pain Max Daily Amount: 4 tablets  Qty: 28 tablet, Refills: 0    Comments: Reduce doses taken as pain becomes manageable Reduce doses taken as pain becomes manageable  Associated Diagnoses: S/P laparoscopic assisted vaginal hysterectomy (LAVH)             Electronically signed by Mable Oliver DO on 1/3/2025 at 8:06 AM

## 2025-01-03 NOTE — PLAN OF CARE
Problem: Pain  Goal: Verbalizes/displays adequate comfort level or baseline comfort level  1/2/2025 2310 by Kaylyn Owens RN  Outcome: Progressing  1/2/2025 1629 by Melany Little, RN  Outcome: Progressing     Problem: Skin/Tissue Integrity - Adult  Goal: Incisions, wounds, or drain sites healing without S/S of infection  1/2/2025 2310 by Kaylyn Owens RN  Outcome: Progressing  1/2/2025 1629 by Melany Little, RN  Outcome: Progressing     Problem: Genitourinary - Adult  Goal: Absence of urinary retention  1/2/2025 2310 by Kaylyn Owens RN  Outcome: Progressing  1/2/2025 1629 by Melany Little, RN  Outcome: Progressing

## 2025-01-03 NOTE — PROGRESS NOTES
Patient's verbal and written discharge instructions given, all questions answered. IV removed, without complications.  All belongings packed, and taken with patient. Follow up appointments discussed. Pt has follow up appt made with Dr. Oliver. New medications reviewed and sent to [facility] for pt to pickup on the way home. Prescriptions picked up from outpatient pharmacy. Patient left in stable condition via wheelchair to private car with spouse.

## 2025-01-03 NOTE — DISCHARGE INSTR - ACTIVITY
Call for increased pain, significant vaginal bleeding (soaking through 2 pads in < 1hr) or temperature greater than 101 degrees F.    Nothing in vagina for 6 weeks (pelvic rest), including intercourse, tampons, toys, fingers.   Advance activity as tolerated but limit lifting <10 lbs for 2-3 wks.   Take PRN medications as prescribed.   FU in office in 2 weeks.     Normal Diet     Please call with questions or concerns.   Mable Oliver, DO

## 2025-01-03 NOTE — PROGRESS NOTES
[facility]   Post Op Note    Subjective:   Patient is a 34 y.o.  female who is s/p LAV POD #1      Pt states she is doing well today. She is oriented to person, place and time. Denies fever / chills / sob / chest pain / nausea / emesis. Pain well controlled. Bowen is out, voiding.Ambulating without difficulty. + Flatus.    Objective:   GENERAL APPEARANCE: alert, well appearing, in no apparent distress, oriented to person, place and time, pale   LUNGS: clear to auscultation, no wheezes, rales or rhonchi, symmetric air entry  HEART: regular rate and rhythm, no murmurs  ABDOMEN: benign non-tender, without masses or organomegaly palpable  EXTREMITIES: no redness or tenderness in the calves or thighs, edema 1+  SKIN: normal coloration and turgor, no rashes, no suspicious skin lesions noted  NEUROLOGIC: alert, oriented, normal speech, no focal findings or movement disorder noted    Pertinent Labs:   H/H: 11.3/34.0 > 10.3/30.0      Pathology:  Pending     Assessment/Plan:    1) Post Op course    - meeting post op milestones    - advance diet as tolerated    - cont PO meds for pain control    - routine post op care   - plan to DC home this am     Please call with any questions or concerns   Mable Oliver DO

## 2025-01-03 NOTE — PROGRESS NOTES
Shift assessment completed.  Pt A&O x4, VSS. Pt tolerating diet, up and ambulating independently. Voiding with no complications. Non skid socks on. Bed locked and in lowest position. Call light and bedside table within reach. Will continue to monitor.

## 2025-01-13 ENCOUNTER — TELEPHONE (OUTPATIENT)
Dept: OBGYN CLINIC | Age: 35
End: 2025-01-13

## 2025-01-13 NOTE — TELEPHONE ENCOUNTER
Pt was called and is aware of Physician response. Pt scheduled for this Thursday per her request.

## 2025-01-13 NOTE — TELEPHONE ENCOUNTER
Pt calling to request same day evaluation. She says she has been taking Fluconazole . She's had 2 doses with no relief of yeast. Reports vaginal odor ( not like BV) Pink discharge as she is still bleeding, itching and creamy white discharge. No same day eval available.Pt offered evaluation for 1/14/25. She is unable to come and says she will be out of town Wednesday. S/P [facility] 1/2/25. Routing to on call provider.Please advise

## 2025-01-13 NOTE — TELEPHONE ENCOUNTER
I apologize, I did not  see this until now.  Would recommend evaluation in AM if she is maybe able to get that to work out.  Otherwise would recommend urgent care as per Dr. Pierce.  Thank you.

## 2025-01-24 ENCOUNTER — OFFICE VISIT (OUTPATIENT)
Dept: OBGYN CLINIC | Age: 35
End: 2025-01-24

## 2025-01-24 VITALS
SYSTOLIC BLOOD PRESSURE: 108 MMHG | DIASTOLIC BLOOD PRESSURE: 60 MMHG | OXYGEN SATURATION: 99 % | WEIGHT: 127.6 LBS | HEART RATE: 103 BPM | BODY MASS INDEX: 22.97 KG/M2

## 2025-01-24 DIAGNOSIS — C56.1 RIGHT OVARIAN EPITHELIAL CANCER (HCC): ICD-10-CM

## 2025-01-24 DIAGNOSIS — Z98.891 HISTORY OF 2 CESAREAN SECTIONS: ICD-10-CM

## 2025-01-24 DIAGNOSIS — Z90.79 HISTORY OF BILATERAL SALPINGECTOMY: ICD-10-CM

## 2025-01-24 DIAGNOSIS — N93.9 ABNORMAL UTERINE BLEEDING (AUB): ICD-10-CM

## 2025-01-24 DIAGNOSIS — Z48.89 POSTOPERATIVE VISIT: Primary | ICD-10-CM

## 2025-01-24 DIAGNOSIS — Z90.710 S/P LAPAROSCOPIC ASSISTED VAGINAL HYSTERECTOMY (LAVH): ICD-10-CM

## 2025-01-24 PROCEDURE — 99024 POSTOP FOLLOW-UP VISIT: CPT | Performed by: OBSTETRICS & GYNECOLOGY

## 2025-01-24 NOTE — PROGRESS NOTES
Outpatient Postoperative Visit     CC:   Chief Complaint   Patient presents with    Post-Op Check       Subjective:  34 y.o.  here for evaluation s/p LAPAROSCOPIC ASSISTED VAGINAL HYSTERECTOMY on 24     Pt seen and examined. Doing well. No concerns complaints. Pain well controlled.  Denies nausea / emesis / fevers / chills etc...   Voiding without complication. Admits to (+) flatus.     Review of Systems - The following ROS was otherwise negative, except as noted in the HPI: constitutional, respiratory, cardiovascular, gastrointestinal, genitourinary    Objective:  /60 (Site: Right Upper Arm, Position: Sitting, Cuff Size: Medium Adult)   Pulse (!) 103   Wt 57.9 kg (127 lb 9.6 oz)   LMP 2024 (Exact Date)   SpO2 99%   BMI 22.97 kg/m²   General: Alert, well appearing, no acute distress  Abdomen: Soft, appropriately tender to palpation, non-distended  Incision: Appears c/d/i, no significant drainage or erythema  Extremities: No redness or tenderness in LE, neg Lena's sign     Path:   FINAL DIAGNOSIS:     Uterus, simple hysterectomy:   -Proliferative phase endometrium.   -Intramural leiomyoma.   -Unremarkable cervix.   -No evidence of atypia or malignancy.       Findings:   Norm ext genitalia, urethra, vagina and cervix  Normal appearing uterus except for suspected adenomyosis   Normal appearing left ovary with physiologic cyst and fine adhesions   Surgically absent tubes and right ovary   Normal bladder, bowel, liver edge     Assessment/Plan:   Diagnosis Orders   1. Postoperative visit        2. S/P laparoscopic assisted vaginal hysterectomy (LAVH)        3. Abnormal uterine bleeding (AUB)        4. History of 2  sections        5. History of bilateral salpingectomy        6. Right ovarian epithelial cancer (HCC)          - reviewed operative course, pathology, findings and photos   - all questions answered   - doing well, meeting milestones   - return precautions reviewed     Follow

## 2025-01-27 ENCOUNTER — TELEPHONE (OUTPATIENT)
Dept: OBGYN CLINIC | Age: 35
End: 2025-01-27

## 2025-01-27 NOTE — TELEPHONE ENCOUNTER
Please send this in for her ASAP   She is cleared to go back to work from GYN standpoint   If you could write a quick excuse for her today while Cristiana and Hanna are out   Thanks     ALCLIFF

## 2025-01-27 NOTE — TELEPHONE ENCOUNTER
Pt calling because she was supposed to receive a clearance for work form via My Chart. She says she never received the form. She is asking if this can be faxed to [phone] and if you can please also send her a copy of it via my chart. Routing to Bonny

## 2025-06-25 ENCOUNTER — TELEPHONE (OUTPATIENT)
Dept: OBGYN CLINIC | Age: 35
End: 2025-06-25

## 2025-06-25 DIAGNOSIS — B37.9 YEAST INFECTION: Primary | ICD-10-CM

## 2025-06-25 RX ORDER — FLUCONAZOLE 150 MG/1
TABLET ORAL
Qty: 2 TABLET | Refills: 1 | OUTPATIENT
Start: 2025-06-25

## 2025-06-25 NOTE — TELEPHONE ENCOUNTER
Pt calling because she has had a yeast infection for the past 5 days. She says she tried OTC monistat and probiotics. She reports vaginal itching, discharge and swelling. Last pap 10/18/25. Phoned in Diflucan and pended to update chart. Please sign or advise

## 2025-08-30 ENCOUNTER — TELEPHONE (OUTPATIENT)
Dept: OBGYN | Age: 35
End: 2025-08-30

## 2025-08-30 DIAGNOSIS — B37.9 YEAST INFECTION: ICD-10-CM

## 2025-08-30 RX ORDER — FLUCONAZOLE 150 MG/1
TABLET ORAL
Qty: 2 TABLET | Refills: 0 | Status: SHIPPED | OUTPATIENT
Start: 2025-08-30

## (undated) DEVICE — BLADE ES L6IN ELASTOMERIC COAT EXT DURABLE BEND UPTO 90DEG

## (undated) DEVICE — SUTURE MCRYL SZ 4-0 L18IN ABSRB UD L19MM PS-2 3/8 CIR PRIM Y496G

## (undated) DEVICE — TROCAR: Brand: KII FIOS FIRST ENTRY

## (undated) DEVICE — SHEET,DRAPE,53X77,STERILE: Brand: MEDLINE

## (undated) DEVICE — 1LYRTR 16FR10ML 100%SILI SNAP: Brand: MEDLINE INDUSTRIES, INC.

## (undated) DEVICE — SUTURE VCRL + SZ 0 L27IN ABSRB UD L36MM CT-1 1/2 CIR VCPP41D

## (undated) DEVICE — 3M™ STERI-STRIP™ COMPOUND BENZOIN TINCTURE 40 BAGS/CARTON 4 CARTONS/CASE C1544: Brand: 3M™ STERI-STRIP™

## (undated) DEVICE — MINOR SET UP: Brand: MEDLINE INDUSTRIES, INC.

## (undated) DEVICE — SYRINGE MED 5ML STD CLR PLAS LUERLOCK TIP N CTRL DISP

## (undated) DEVICE — SUTURE VCRL SZ 0 L36IN ABSRB UD L36MM CT-1 1/2 CIR J946H

## (undated) DEVICE — GAUZE,SPONGE,2X2,4PLY,NS,NW,LF: Brand: MEDLINE

## (undated) DEVICE — PAD,NON-ADHERENT,3X8,STERILE,LF,1/PK: Brand: MEDLINE

## (undated) DEVICE — CHLORAPREP 26ML ORANGE

## (undated) DEVICE — TELFA NON-ADHERENT ABSORBENT DRESSING: Brand: TELFA

## (undated) DEVICE — YANKAUER SUCTION INSTRUMENT WITHOUT CONTROL VENT, OPEN TIP, CLEAR: Brand: YANKAUER

## (undated) DEVICE — 3M™ STERI-STRIP™ REINFORCED ADHESIVE SKIN CLOSURES, R1547, 1/2 IN X 4 IN (12 MM X 100 MM), 6 STRIPS/ENVELOPE: Brand: 3M™ STERI-STRIP™

## (undated) DEVICE — INTENDED FOR TISSUE SEPARATION, AND OTHER PROCEDURES THAT REQUIRE A SHARP SURGICAL BLADE TO PUNCTURE OR CUT.: Brand: BARD-PARKER ® STAINLESS STEEL BLADES

## (undated) DEVICE — YANKAUER,OPEN TIP,W/O VENT,STERILE: Brand: MEDLINE INDUSTRIES, INC.

## (undated) DEVICE — GARMENT COMPR L FOR 23IN CALF FLOTRN

## (undated) DEVICE — BLADE CLIPPER GEN PURP NS

## (undated) DEVICE — PLUS BARRIER ISLAND DRESSING: Brand: TELFA

## (undated) DEVICE — TUBING, SUCTION, 3/16" X 12', STRAIGHT: Brand: MEDLINE

## (undated) DEVICE — SUTURE MCRYL + SZ 4-0 L18IN ABSRB UD L19MM PS-2 3/8 CIR MCP496G

## (undated) DEVICE — ADHESIVE SKIN CLSR 0.7ML TOP DERMBND ADV

## (undated) DEVICE — TROCAR: Brand: KII SLEEVE

## (undated) DEVICE — GLOVE ORANGE PI 7   MSG9070

## (undated) DEVICE — SHEET,DRAPE,UNDERBUTTOCK,GRAD POUCH,PORT: Brand: MEDLINE

## (undated) DEVICE — APPLICATOR MEDICATED 26 CC TINT HI-LITE ORNG STRL CHLORAPREP

## (undated) DEVICE — Device: Brand: PRIME MEDICAL LLC

## (undated) DEVICE — DRAPE,T,LAPARO,TRANS,STERILE: Brand: MEDLINE

## (undated) DEVICE — TOWEL,OR,DSP,ST,BLUE,STD,6/PK,12PK/CS: Brand: MEDLINE

## (undated) DEVICE — SUTURE MONOCRYL + SZ 4-0 L18IN ABSRB UD L19MM PS-2 3/8 CIR MCP496G

## (undated) DEVICE — TRAY URIN CATH 16FR DRNGE BG STATLOK STBL DEV F SURSTP

## (undated) DEVICE — Device

## (undated) DEVICE — GLOVE ORANGE PI 8   MSG9080

## (undated) DEVICE — SUTURE VCRL + SZ 3-0 L18IN ABSRB UD SH 1/2 CIR TAPERCUT NDL VCP864D

## (undated) DEVICE — SUTURE ABSORBABLE BRAIDED 2-0 CT-1 27 IN UD VICRYL J259H

## (undated) DEVICE — SUTURE VCRL SZ 1 L36IN ABSRB VLT L36MM CT-1 1/2 CIR J347H

## (undated) DEVICE — SUTURE PROL SZ 2-0 L30IN NONABSORBABLE BLU L26MM CT-2 1/2 8411H

## (undated) DEVICE — SYRINGE MED 10ML LUERLOCK TIP W/O SFTY DISP

## (undated) DEVICE — SKIN AFFIX SURG ADHESIVE 72/CS 0.55ML: Brand: MEDLINE

## (undated) DEVICE — GLOVE SURG SZ 65 THK91MIL LTX FREE SYN POLYISOPRENE

## (undated) DEVICE — SUTURE PDS II SZ 0 L60IN ABSRB VLT L48MM CTX 1/2 CIR Z990G

## (undated) DEVICE — GAUZE,SPONGE,4"X4",16PLY,XRAY,STRL,LF: Brand: MEDLINE

## (undated) DEVICE — STAPLER EXT SKIN 35 WIDE S STL STPL SQUEEZE HNDL VISISTAT

## (undated) DEVICE — DRESSING COMP IS W4XL10IN PD W2XL8IN CNTCT LAYR ADH

## (undated) DEVICE — PUMP SUC IRR TBNG L10FT W/ HNDPC ASSEMB STRYKEFLOW 2

## (undated) DEVICE — GLOVE SURG SZ 7 L12IN FNGR THK79MIL GRN LTX FREE

## (undated) DEVICE — SPONGE LAP W18XL18IN WHT COT 4 PLY FLD STRUNG RADPQ DISP ST

## (undated) DEVICE — SUTURE VCRL SZ 4-0 L27IN ABSRB UD L60MM KS STR REV CUT NDL J662H

## (undated) DEVICE — S/USE RESUS KIT W/O MASK (10): Brand: FISHER & PAYKEL HEALTHCARE

## (undated) DEVICE — DRESSING FOAM W6.3XL7.9IN TAN SACR SELF ADH MEPILEX BORD

## (undated) DEVICE — SUTURE VCRL SZ 0 L36IN ABSRB UD CT-1 L36MM 1/2 CIR TAPR PNT VCP946H

## (undated) DEVICE — SUTURE VCRL + SZ 0 L18IN ABSRB UD L36MM CT-1 1/2 CIR VCP840D

## (undated) DEVICE — NEEDLE, QUINCKE, 18GX3.5": Brand: MEDLINE

## (undated) DEVICE — PAD SANITARY VERSALON MATERNITY REG

## (undated) DEVICE — SUTURE VICRYL SZ 0 L36IN ABSRB UD CT-1 L36MM 1/2 CIR TAPR PNT VCP946H

## (undated) DEVICE — NEPTUNE E-SEP SMOKE EVACUATION PENCIL, COATED, 70MM BLADE, PUSH BUTTON SWITCH: Brand: NEPTUNE E-SEP

## (undated) DEVICE — SUTURE VCRL SZ 3-0 L27IN ABSRB UD L36MM CT-1 1/2 CIR J258H

## (undated) DEVICE — TRAY PREP DRY W/ PREM GLV 2 APPL 6 SPNG 2 UNDPD 1 OVERWRAP

## (undated) DEVICE — SUTURE VCRL + SZ 3-0 L27IN ABSRB UD L26MM SH 1/2 CIR VCP416H

## (undated) DEVICE — SKIN PREP TRAY 4 COMPARTM TRAY: Brand: MEDLINE INDUSTRIES, INC.

## (undated) DEVICE — SUTURE MCRYL SZ 3-0 L27IN ABSRB UD L60MM KS STR REV CUT Y523H

## (undated) DEVICE — SOLUTION IV IRRIG POUR BRL 0.9% SODIUM CHL 2F7124

## (undated) DEVICE — Z CONVERTED USE 2271043 CONTAINER SPEC COLL 4OZ SCR ON LID PEEL PCH

## (undated) DEVICE — COVER,TABLE,44X90,STERILE: Brand: MEDLINE

## (undated) DEVICE — SPONGE LAP W18XL18IN WHT COT 4 PLY FLD STRUNG RADPQ DISP ST 2 PER PACK

## (undated) DEVICE — TOTAL TRAY, DB, 100% SILI FOLEY, 16FR 10: Brand: MEDLINE

## (undated) DEVICE — SEALER TISS L20CM DIA13MM ADV BPLR L CRV JAW OPN APPRCH

## (undated) DEVICE — 3M™ TEGADERM™ TRANSPARENT FILM DRESSING FRAME STYLE, 1624W, 2-3/8 IN X 2-3/4 IN (6 CM X 7 CM), 100/CT 4CT/CASE: Brand: 3M™ TEGADERM™

## (undated) DEVICE — SYRINGE IRRIG 60ML SFT PLIABLE BLB EZ TO GRP 1 HND USE W/

## (undated) DEVICE — DECANTER: Brand: UNBRANDED